# Patient Record
Sex: FEMALE | Race: WHITE | Employment: OTHER | URBAN - METROPOLITAN AREA
[De-identification: names, ages, dates, MRNs, and addresses within clinical notes are randomized per-mention and may not be internally consistent; named-entity substitution may affect disease eponyms.]

---

## 2017-02-16 PROBLEM — M85.80 OSTEOPENIA: Status: ACTIVE | Noted: 2017-02-16

## 2017-02-16 PROBLEM — Z79.811 VISIT FOR MONITORING ARIMIDEX THERAPY: Status: ACTIVE | Noted: 2017-02-16

## 2017-02-16 PROBLEM — Z51.81 VISIT FOR MONITORING ARIMIDEX THERAPY: Status: ACTIVE | Noted: 2017-02-16

## 2017-02-16 PROBLEM — Z79.811 USE OF ANASTROZOLE (ARIMIDEX): Status: ACTIVE | Noted: 2017-02-16

## 2017-08-28 ENCOUNTER — HOSPITAL ENCOUNTER (OUTPATIENT)
Dept: WOMENS IMAGING | Age: 62
Discharge: HOME | End: 2017-08-28
Attending: SURGERY | Admitting: OBSTETRICS & GYNECOLOGY

## 2017-08-28 DIAGNOSIS — C50.111 MALIGNANT NEOPLASM OF CENTRAL PORTION OF RIGHT FEMALE BREAST (HCC): ICD-10-CM

## 2017-08-28 DIAGNOSIS — Z79.811 USE OF ANASTROZOLE (ARIMIDEX): ICD-10-CM

## 2018-08-21 ENCOUNTER — HOSPITAL ENCOUNTER (OUTPATIENT)
Dept: WOMENS IMAGING | Age: 63
Discharge: HOME | End: 2018-08-21
Attending: FAMILY MEDICINE | Admitting: FAMILY MEDICINE

## 2018-08-21 DIAGNOSIS — C50.111 MALIGNANT NEOPLASM OF CENTRAL PORTION OF RIGHT FEMALE BREAST (HCC): ICD-10-CM

## 2018-08-21 DIAGNOSIS — Z12.31 VISIT FOR SCREENING MAMMOGRAM: ICD-10-CM

## 2019-08-23 ENCOUNTER — HOSPITAL ENCOUNTER (OUTPATIENT)
Dept: WOMENS IMAGING | Age: 64
Discharge: HOME | End: 2019-08-23
Payer: MEDICARE

## 2019-08-23 DIAGNOSIS — Z12.39 BREAST CANCER SCREENING: ICD-10-CM

## 2019-08-23 PROCEDURE — 77063 BREAST TOMOSYNTHESIS BI: CPT

## 2019-08-26 ENCOUNTER — HOSPITAL ENCOUNTER (OUTPATIENT)
Dept: WOMENS IMAGING | Age: 64
Discharge: HOME | End: 2019-08-26
Payer: MEDICARE

## 2019-08-26 ENCOUNTER — HOSPITAL ENCOUNTER (OUTPATIENT)
Dept: ULTRASOUND IMAGING | Age: 64
Discharge: HOME | End: 2019-08-26
Payer: MEDICARE

## 2019-08-26 DIAGNOSIS — N63.10 BREAST MASS, RIGHT: ICD-10-CM

## 2019-08-26 DIAGNOSIS — R92.8 ABNORMAL MAMMOGRAM: ICD-10-CM

## 2019-08-26 PROCEDURE — 76642 ULTRASOUND BREAST LIMITED: CPT

## 2019-08-26 PROCEDURE — G0279 TOMOSYNTHESIS, MAMMO: HCPCS

## 2020-02-27 ENCOUNTER — HOSPITAL ENCOUNTER (OUTPATIENT)
Dept: WOMENS IMAGING | Age: 65
Discharge: HOME | End: 2020-02-27
Payer: MEDICARE

## 2020-02-27 PROCEDURE — G0279 TOMOSYNTHESIS, MAMMO: HCPCS

## 2020-08-27 ENCOUNTER — HOSPITAL ENCOUNTER (OUTPATIENT)
Dept: WOMENS IMAGING | Age: 65
Discharge: HOME | End: 2020-08-27
Payer: MEDICARE

## 2020-08-27 PROCEDURE — 77063 BREAST TOMOSYNTHESIS BI: CPT

## 2020-12-28 ENCOUNTER — APPOINTMENT (OUTPATIENT)
Dept: GENERAL RADIOLOGY | Age: 65
DRG: 177 | End: 2020-12-28
Payer: MEDICARE

## 2020-12-28 ENCOUNTER — HOSPITAL ENCOUNTER (INPATIENT)
Age: 65
LOS: 26 days | Discharge: HOME | DRG: 177 | End: 2021-01-23
Attending: EMERGENCY MEDICINE | Admitting: STUDENT IN AN ORGANIZED HEALTH CARE EDUCATION/TRAINING PROGRAM
Payer: MEDICARE

## 2020-12-28 DIAGNOSIS — Z20.822 COVID-19 VIRUS TEST RESULT UNKNOWN: ICD-10-CM

## 2020-12-28 DIAGNOSIS — J18.9 PNEUMONIA DUE TO ORGANISM: Primary | ICD-10-CM

## 2020-12-28 LAB
A/G RATIO: 1.1 (ref 1.1–2.2)
ALBUMIN SERPL-MCNC: 3.3 G/DL (ref 3.4–5)
ALP BLD-CCNC: 60 U/L (ref 40–129)
ALT SERPL-CCNC: 25 U/L (ref 10–40)
ANION GAP SERPL CALCULATED.3IONS-SCNC: 13 MMOL/L (ref 3–16)
AST SERPL-CCNC: 52 U/L (ref 15–37)
BASOPHILS ABSOLUTE: 0 K/UL (ref 0–0.2)
BASOPHILS RELATIVE PERCENT: 0.7 %
BILIRUB SERPL-MCNC: <0.2 MG/DL (ref 0–1)
BUN BLDV-MCNC: 15 MG/DL (ref 7–20)
CALCIUM SERPL-MCNC: 8.8 MG/DL (ref 8.3–10.6)
CHLORIDE BLD-SCNC: 106 MMOL/L (ref 99–110)
CO2: 23 MMOL/L (ref 21–32)
CREAT SERPL-MCNC: 1 MG/DL (ref 0.6–1.2)
D DIMER: 0.41 UG/ML FEU
EOSINOPHILS ABSOLUTE: 0 K/UL (ref 0–0.6)
EOSINOPHILS RELATIVE PERCENT: 0.3 %
GFR AFRICAN AMERICAN: >60
GFR NON-AFRICAN AMERICAN: 56
GLOBULIN: 2.9 G/DL
GLUCOSE BLD-MCNC: 115 MG/DL (ref 70–99)
HCT VFR BLD CALC: 37 % (ref 36–48)
HEMOGLOBIN: 12 G/DL (ref 12–16)
LACTIC ACID: 1.1 MMOL/L (ref 0.4–2)
LYMPHOCYTES ABSOLUTE: 0.6 K/UL (ref 1–5.1)
LYMPHOCYTES RELATIVE PERCENT: 13.3 %
MAGNESIUM: 1.7 MG/DL (ref 1.8–2.4)
MCH RBC QN AUTO: 29.8 PG (ref 26–34)
MCHC RBC AUTO-ENTMCNC: 32.4 G/DL (ref 31–36)
MCV RBC AUTO: 91.8 FL (ref 80–100)
MONOCYTES ABSOLUTE: 0.3 K/UL (ref 0–1.3)
MONOCYTES RELATIVE PERCENT: 6.5 %
NEUTROPHILS ABSOLUTE: 3.5 K/UL (ref 1.7–7.7)
NEUTROPHILS RELATIVE PERCENT: 79.2 %
PDW BLD-RTO: 13.7 % (ref 12.4–15.4)
PLATELET # BLD: 218 K/UL (ref 135–450)
PMV BLD AUTO: 9 FL (ref 5–10.5)
POTASSIUM REFLEX MAGNESIUM: 3.3 MMOL/L (ref 3.5–5.1)
PRO-BNP: 285 PG/ML (ref 0–124)
RBC # BLD: 4.04 M/UL (ref 4–5.2)
SODIUM BLD-SCNC: 142 MMOL/L (ref 136–145)
TOTAL PROTEIN: 6.2 G/DL (ref 6.4–8.2)
TROPONIN: <0.01 NG/ML
WBC # BLD: 4.4 K/UL (ref 4–11)

## 2020-12-28 PROCEDURE — U0003 INFECTIOUS AGENT DETECTION BY NUCLEIC ACID (DNA OR RNA); SEVERE ACUTE RESPIRATORY SYNDROME CORONAVIRUS 2 (SARS-COV-2) (CORONAVIRUS DISEASE [COVID-19]), AMPLIFIED PROBE TECHNIQUE, MAKING USE OF HIGH THROUGHPUT TECHNOLOGIES AS DESCRIBED BY CMS-2020-01-R: HCPCS

## 2020-12-28 PROCEDURE — 36415 COLL VENOUS BLD VENIPUNCTURE: CPT

## 2020-12-28 PROCEDURE — 83880 ASSAY OF NATRIURETIC PEPTIDE: CPT

## 2020-12-28 PROCEDURE — 6370000000 HC RX 637 (ALT 250 FOR IP): Performed by: EMERGENCY MEDICINE

## 2020-12-28 PROCEDURE — 1200000000 HC SEMI PRIVATE

## 2020-12-28 PROCEDURE — 83735 ASSAY OF MAGNESIUM: CPT

## 2020-12-28 PROCEDURE — 6360000002 HC RX W HCPCS: Performed by: EMERGENCY MEDICINE

## 2020-12-28 PROCEDURE — 85379 FIBRIN DEGRADATION QUANT: CPT

## 2020-12-28 PROCEDURE — 80053 COMPREHEN METABOLIC PANEL: CPT

## 2020-12-28 PROCEDURE — 93005 ELECTROCARDIOGRAM TRACING: CPT | Performed by: EMERGENCY MEDICINE

## 2020-12-28 PROCEDURE — 87040 BLOOD CULTURE FOR BACTERIA: CPT

## 2020-12-28 PROCEDURE — XW033E5 INTRODUCTION OF REMDESIVIR ANTI-INFECTIVE INTO PERIPHERAL VEIN, PERCUTANEOUS APPROACH, NEW TECHNOLOGY GROUP 5: ICD-10-PCS | Performed by: FAMILY MEDICINE

## 2020-12-28 PROCEDURE — 99285 EMERGENCY DEPT VISIT HI MDM: CPT

## 2020-12-28 PROCEDURE — XW13325 TRANSFUSION OF CONVALESCENT PLASMA (NONAUTOLOGOUS) INTO PERIPHERAL VEIN, PERCUTANEOUS APPROACH, NEW TECHNOLOGY GROUP 5: ICD-10-PCS | Performed by: FAMILY MEDICINE

## 2020-12-28 PROCEDURE — 71045 X-RAY EXAM CHEST 1 VIEW: CPT

## 2020-12-28 PROCEDURE — 2580000003 HC RX 258: Performed by: EMERGENCY MEDICINE

## 2020-12-28 PROCEDURE — 85025 COMPLETE CBC W/AUTO DIFF WBC: CPT

## 2020-12-28 PROCEDURE — 83605 ASSAY OF LACTIC ACID: CPT

## 2020-12-28 PROCEDURE — 84484 ASSAY OF TROPONIN QUANT: CPT

## 2020-12-28 RX ORDER — ASPIRIN 81 MG/1
324 TABLET, CHEWABLE ORAL ONCE
Status: COMPLETED | OUTPATIENT
Start: 2020-12-28 | End: 2020-12-28

## 2020-12-28 RX ORDER — ONDANSETRON 2 MG/ML
4 INJECTION INTRAMUSCULAR; INTRAVENOUS ONCE
Status: COMPLETED | OUTPATIENT
Start: 2020-12-28 | End: 2020-12-28

## 2020-12-28 RX ORDER — DEXAMETHASONE SODIUM PHOSPHATE 4 MG/ML
6 INJECTION, SOLUTION INTRA-ARTICULAR; INTRALESIONAL; INTRAMUSCULAR; INTRAVENOUS; SOFT TISSUE ONCE
Status: COMPLETED | OUTPATIENT
Start: 2020-12-28 | End: 2020-12-28

## 2020-12-28 RX ORDER — SODIUM CHLORIDE 9 MG/ML
1000 INJECTION, SOLUTION INTRAVENOUS CONTINUOUS
Status: DISCONTINUED | OUTPATIENT
Start: 2020-12-28 | End: 2020-12-29

## 2020-12-28 RX ADMIN — AZITHROMYCIN MONOHYDRATE 500 MG: 500 INJECTION, POWDER, LYOPHILIZED, FOR SOLUTION INTRAVENOUS at 22:49

## 2020-12-28 RX ADMIN — ENOXAPARIN SODIUM 40 MG: 40 INJECTION SUBCUTANEOUS at 23:18

## 2020-12-28 RX ADMIN — DEXAMETHASONE SODIUM PHOSPHATE 6 MG: 4 INJECTION, SOLUTION INTRAMUSCULAR; INTRAVENOUS at 23:20

## 2020-12-28 RX ADMIN — ONDANSETRON 4 MG: 2 INJECTION INTRAMUSCULAR; INTRAVENOUS at 23:27

## 2020-12-28 RX ADMIN — CEFTRIAXONE 1 G: 1 INJECTION, POWDER, FOR SOLUTION INTRAMUSCULAR; INTRAVENOUS at 23:59

## 2020-12-28 RX ADMIN — ASPIRIN 324 MG: 81 TABLET, CHEWABLE ORAL at 22:48

## 2020-12-28 ASSESSMENT — PAIN SCALES - GENERAL: PAINLEVEL_OUTOF10: 6

## 2020-12-29 PROBLEM — E11.9 DM2 (DIABETES MELLITUS, TYPE 2) (HCC): Status: ACTIVE | Noted: 2020-12-29

## 2020-12-29 PROBLEM — E78.5 HLD (HYPERLIPIDEMIA): Status: ACTIVE | Noted: 2020-12-29

## 2020-12-29 PROBLEM — Z20.822 SUSPECTED COVID-19 VIRUS INFECTION: Status: ACTIVE | Noted: 2020-12-29

## 2020-12-29 PROBLEM — E03.9 HYPOTHYROIDISM: Status: ACTIVE | Noted: 2020-12-29

## 2020-12-29 PROBLEM — E28.2 PCOS (POLYCYSTIC OVARIAN SYNDROME): Status: ACTIVE | Noted: 2020-12-29

## 2020-12-29 LAB
A/G RATIO: 1.1 (ref 1.1–2.2)
ABO/RH: NORMAL
ALBUMIN SERPL-MCNC: 3 G/DL (ref 3.4–5)
ALP BLD-CCNC: 58 U/L (ref 40–129)
ALT SERPL-CCNC: 22 U/L (ref 10–40)
ANION GAP SERPL CALCULATED.3IONS-SCNC: 12 MMOL/L (ref 3–16)
ANTIBODY SCREEN: NORMAL
AST SERPL-CCNC: 43 U/L (ref 15–37)
BASOPHILS ABSOLUTE: 0 K/UL (ref 0–0.2)
BASOPHILS RELATIVE PERCENT: 0.1 %
BILIRUB SERPL-MCNC: <0.2 MG/DL (ref 0–1)
BUN BLDV-MCNC: 13 MG/DL (ref 7–20)
CALCIUM SERPL-MCNC: 8.4 MG/DL (ref 8.3–10.6)
CHLORIDE BLD-SCNC: 109 MMOL/L (ref 99–110)
CO2: 22 MMOL/L (ref 21–32)
CREAT SERPL-MCNC: 0.8 MG/DL (ref 0.6–1.2)
EOSINOPHILS ABSOLUTE: 0 K/UL (ref 0–0.6)
EOSINOPHILS RELATIVE PERCENT: 0 %
GFR AFRICAN AMERICAN: >60
GFR NON-AFRICAN AMERICAN: >60
GLOBULIN: 2.7 G/DL
GLUCOSE BLD-MCNC: 135 MG/DL (ref 70–99)
GLUCOSE BLD-MCNC: 147 MG/DL (ref 70–99)
GLUCOSE BLD-MCNC: 168 MG/DL (ref 70–99)
GLUCOSE BLD-MCNC: 175 MG/DL (ref 70–99)
GLUCOSE BLD-MCNC: 204 MG/DL (ref 70–99)
HCT VFR BLD CALC: 35.4 % (ref 36–48)
HEMOGLOBIN: 11.8 G/DL (ref 12–16)
L. PNEUMOPHILA SEROGP 1 UR AG: NORMAL
LYMPHOCYTES ABSOLUTE: 0.5 K/UL (ref 1–5.1)
LYMPHOCYTES RELATIVE PERCENT: 9.7 %
MAGNESIUM: 1.8 MG/DL (ref 1.8–2.4)
MCH RBC QN AUTO: 30.8 PG (ref 26–34)
MCHC RBC AUTO-ENTMCNC: 33.5 G/DL (ref 31–36)
MCV RBC AUTO: 91.8 FL (ref 80–100)
MONOCYTES ABSOLUTE: 0.2 K/UL (ref 0–1.3)
MONOCYTES RELATIVE PERCENT: 4 %
NEUTROPHILS ABSOLUTE: 4.1 K/UL (ref 1.7–7.7)
NEUTROPHILS RELATIVE PERCENT: 86.2 %
PDW BLD-RTO: 14.6 % (ref 12.4–15.4)
PERFORMED ON: ABNORMAL
PLATELET # BLD: 201 K/UL (ref 135–450)
PMV BLD AUTO: 9.1 FL (ref 5–10.5)
POTASSIUM REFLEX MAGNESIUM: 3.4 MMOL/L (ref 3.5–5.1)
PROCALCITONIN: 0.1 NG/ML (ref 0–0.15)
RBC # BLD: 3.85 M/UL (ref 4–5.2)
SARS-COV-2, PCR: DETECTED
SODIUM BLD-SCNC: 143 MMOL/L (ref 136–145)
STREP PNEUMONIAE ANTIGEN, URINE: NORMAL
TOTAL PROTEIN: 5.7 G/DL (ref 6.4–8.2)
WBC # BLD: 4.7 K/UL (ref 4–11)

## 2020-12-29 PROCEDURE — 6360000002 HC RX W HCPCS: Performed by: STUDENT IN AN ORGANIZED HEALTH CARE EDUCATION/TRAINING PROGRAM

## 2020-12-29 PROCEDURE — 2580000003 HC RX 258: Performed by: STUDENT IN AN ORGANIZED HEALTH CARE EDUCATION/TRAINING PROGRAM

## 2020-12-29 PROCEDURE — 80053 COMPREHEN METABOLIC PANEL: CPT

## 2020-12-29 PROCEDURE — 85025 COMPLETE CBC W/AUTO DIFF WBC: CPT

## 2020-12-29 PROCEDURE — 94640 AIRWAY INHALATION TREATMENT: CPT

## 2020-12-29 PROCEDURE — 6370000000 HC RX 637 (ALT 250 FOR IP): Performed by: STUDENT IN AN ORGANIZED HEALTH CARE EDUCATION/TRAINING PROGRAM

## 2020-12-29 PROCEDURE — 86901 BLOOD TYPING SEROLOGIC RH(D): CPT

## 2020-12-29 PROCEDURE — 87449 NOS EACH ORGANISM AG IA: CPT

## 2020-12-29 PROCEDURE — 2700000000 HC OXYGEN THERAPY PER DAY

## 2020-12-29 PROCEDURE — 84145 PROCALCITONIN (PCT): CPT

## 2020-12-29 PROCEDURE — 94761 N-INVAS EAR/PLS OXIMETRY MLT: CPT

## 2020-12-29 PROCEDURE — 86900 BLOOD TYPING SEROLOGIC ABO: CPT

## 2020-12-29 PROCEDURE — 83735 ASSAY OF MAGNESIUM: CPT

## 2020-12-29 PROCEDURE — 6370000000 HC RX 637 (ALT 250 FOR IP): Performed by: FAMILY MEDICINE

## 2020-12-29 PROCEDURE — 2060000000 HC ICU INTERMEDIATE R&B

## 2020-12-29 PROCEDURE — 86850 RBC ANTIBODY SCREEN: CPT

## 2020-12-29 PROCEDURE — 93005 ELECTROCARDIOGRAM TRACING: CPT | Performed by: EMERGENCY MEDICINE

## 2020-12-29 PROCEDURE — 2580000003 HC RX 258: Performed by: EMERGENCY MEDICINE

## 2020-12-29 PROCEDURE — 36415 COLL VENOUS BLD VENIPUNCTURE: CPT

## 2020-12-29 RX ORDER — DEXTROSE MONOHYDRATE 50 MG/ML
100 INJECTION, SOLUTION INTRAVENOUS PRN
Status: DISCONTINUED | OUTPATIENT
Start: 2020-12-29 | End: 2021-01-23 | Stop reason: HOSPADM

## 2020-12-29 RX ORDER — DICYCLOMINE HCL 20 MG
20 TABLET ORAL 3 TIMES DAILY PRN
Status: ON HOLD | COMMUNITY
End: 2021-01-22 | Stop reason: HOSPADM

## 2020-12-29 RX ORDER — DEXTROSE MONOHYDRATE 25 G/50ML
12.5 INJECTION, SOLUTION INTRAVENOUS PRN
Status: DISCONTINUED | OUTPATIENT
Start: 2020-12-29 | End: 2021-01-23 | Stop reason: HOSPADM

## 2020-12-29 RX ORDER — METOPROLOL SUCCINATE 100 MG/1
100 TABLET, EXTENDED RELEASE ORAL DAILY
Status: ON HOLD | COMMUNITY
End: 2021-01-22 | Stop reason: HOSPADM

## 2020-12-29 RX ORDER — LISINOPRIL 30 MG/1
30 TABLET ORAL DAILY
Status: ON HOLD | COMMUNITY
End: 2021-01-22 | Stop reason: HOSPADM

## 2020-12-29 RX ORDER — ANASTROZOLE 1 MG/1
1 TABLET ORAL DAILY
Status: DISCONTINUED | OUTPATIENT
Start: 2020-12-30 | End: 2021-01-23 | Stop reason: HOSPADM

## 2020-12-29 RX ORDER — AMLODIPINE BESYLATE 5 MG/1
5 TABLET ORAL DAILY
Status: ON HOLD | COMMUNITY
End: 2021-01-22 | Stop reason: SDUPTHER

## 2020-12-29 RX ORDER — SODIUM CHLORIDE 0.9 % (FLUSH) 0.9 %
10 SYRINGE (ML) INJECTION EVERY 12 HOURS SCHEDULED
Status: DISCONTINUED | OUTPATIENT
Start: 2020-12-29 | End: 2021-01-14 | Stop reason: SDUPTHER

## 2020-12-29 RX ORDER — DEXAMETHASONE 6 MG/1
6 TABLET ORAL DAILY
Status: DISCONTINUED | OUTPATIENT
Start: 2020-12-29 | End: 2020-12-30

## 2020-12-29 RX ORDER — ONDANSETRON 2 MG/ML
4 INJECTION INTRAMUSCULAR; INTRAVENOUS EVERY 6 HOURS PRN
Status: DISCONTINUED | OUTPATIENT
Start: 2020-12-29 | End: 2021-01-23 | Stop reason: HOSPADM

## 2020-12-29 RX ORDER — ASPIRIN 81 MG/1
81 TABLET, CHEWABLE ORAL DAILY
Status: DISCONTINUED | OUTPATIENT
Start: 2020-12-29 | End: 2021-01-21

## 2020-12-29 RX ORDER — SODIUM CHLORIDE 0.9 % (FLUSH) 0.9 %
10 SYRINGE (ML) INJECTION PRN
Status: DISCONTINUED | OUTPATIENT
Start: 2020-12-29 | End: 2021-01-14 | Stop reason: SDUPTHER

## 2020-12-29 RX ORDER — ALBUTEROL SULFATE 90 UG/1
2 AEROSOL, METERED RESPIRATORY (INHALATION) 4 TIMES DAILY
Status: DISCONTINUED | OUTPATIENT
Start: 2020-12-29 | End: 2021-01-10

## 2020-12-29 RX ORDER — SODIUM CHLORIDE 9 MG/ML
1000 INJECTION, SOLUTION INTRAVENOUS CONTINUOUS
Status: ACTIVE | OUTPATIENT
Start: 2020-12-29 | End: 2020-12-29

## 2020-12-29 RX ORDER — LEVOTHYROXINE SODIUM 0.07 MG/1
75 TABLET ORAL DAILY
Status: DISCONTINUED | OUTPATIENT
Start: 2020-12-29 | End: 2021-01-23 | Stop reason: HOSPADM

## 2020-12-29 RX ORDER — NICOTINE POLACRILEX 4 MG
15 LOZENGE BUCCAL PRN
Status: DISCONTINUED | OUTPATIENT
Start: 2020-12-29 | End: 2021-01-23 | Stop reason: HOSPADM

## 2020-12-29 RX ORDER — VITAMIN B COMPLEX
2000 TABLET ORAL DAILY
Status: DISCONTINUED | OUTPATIENT
Start: 2020-12-29 | End: 2021-01-11

## 2020-12-29 RX ORDER — GUAIFENESIN/DEXTROMETHORPHAN 100-10MG/5
5 SYRUP ORAL EVERY 4 HOURS PRN
Status: DISCONTINUED | OUTPATIENT
Start: 2020-12-29 | End: 2021-01-23 | Stop reason: HOSPADM

## 2020-12-29 RX ORDER — ACETAMINOPHEN 650 MG/1
650 SUPPOSITORY RECTAL EVERY 6 HOURS PRN
Status: DISCONTINUED | OUTPATIENT
Start: 2020-12-29 | End: 2021-01-23 | Stop reason: HOSPADM

## 2020-12-29 RX ORDER — ATORVASTATIN CALCIUM 40 MG/1
40 TABLET, FILM COATED ORAL DAILY
Status: DISCONTINUED | OUTPATIENT
Start: 2020-12-29 | End: 2021-01-23 | Stop reason: HOSPADM

## 2020-12-29 RX ORDER — ACETAMINOPHEN 325 MG/1
650 TABLET ORAL EVERY 6 HOURS PRN
Status: DISCONTINUED | OUTPATIENT
Start: 2020-12-29 | End: 2021-01-23 | Stop reason: HOSPADM

## 2020-12-29 RX ORDER — TIZANIDINE 2 MG/1
4 TABLET ORAL NIGHTLY
Status: ON HOLD | COMMUNITY
End: 2021-01-22 | Stop reason: HOSPADM

## 2020-12-29 RX ORDER — DULOXETIN HYDROCHLORIDE 60 MG/1
60 CAPSULE, DELAYED RELEASE ORAL DAILY
Status: DISCONTINUED | OUTPATIENT
Start: 2020-12-30 | End: 2021-01-23 | Stop reason: HOSPADM

## 2020-12-29 RX ADMIN — IPRATROPIUM BROMIDE 2 PUFF: 17 AEROSOL, METERED RESPIRATORY (INHALATION) at 12:20

## 2020-12-29 RX ADMIN — IPRATROPIUM BROMIDE 2 PUFF: 17 AEROSOL, METERED RESPIRATORY (INHALATION) at 16:57

## 2020-12-29 RX ADMIN — ALBUTEROL SULFATE 2 PUFF: 90 AEROSOL, METERED RESPIRATORY (INHALATION) at 16:57

## 2020-12-29 RX ADMIN — AZITHROMYCIN MONOHYDRATE 500 MG: 500 INJECTION, POWDER, LYOPHILIZED, FOR SOLUTION INTRAVENOUS at 23:31

## 2020-12-29 RX ADMIN — SODIUM CHLORIDE 1000 ML: 9 INJECTION, SOLUTION INTRAVENOUS at 03:24

## 2020-12-29 RX ADMIN — Medication 2000 UNITS: at 20:56

## 2020-12-29 RX ADMIN — ATORVASTATIN CALCIUM 40 MG: 40 TABLET, FILM COATED ORAL at 10:01

## 2020-12-29 RX ADMIN — ENOXAPARIN SODIUM 30 MG: 30 INJECTION SUBCUTANEOUS at 10:01

## 2020-12-29 RX ADMIN — ALBUTEROL SULFATE 2 PUFF: 90 AEROSOL, METERED RESPIRATORY (INHALATION) at 08:46

## 2020-12-29 RX ADMIN — INSULIN LISPRO 1 UNITS: 100 INJECTION, SOLUTION INTRAVENOUS; SUBCUTANEOUS at 20:58

## 2020-12-29 RX ADMIN — DEXAMETHASONE 6 MG: 6 TABLET ORAL at 10:01

## 2020-12-29 RX ADMIN — SODIUM CHLORIDE 1000 ML: 9 INJECTION, SOLUTION INTRAVENOUS at 00:20

## 2020-12-29 RX ADMIN — SODIUM CHLORIDE, PRESERVATIVE FREE 10 ML: 5 INJECTION INTRAVENOUS at 20:57

## 2020-12-29 RX ADMIN — ALBUTEROL SULFATE 2 PUFF: 90 AEROSOL, METERED RESPIRATORY (INHALATION) at 20:01

## 2020-12-29 RX ADMIN — INSULIN LISPRO 1 UNITS: 100 INJECTION, SOLUTION INTRAVENOUS; SUBCUTANEOUS at 10:01

## 2020-12-29 RX ADMIN — ONDANSETRON 4 MG: 2 INJECTION INTRAMUSCULAR; INTRAVENOUS at 12:09

## 2020-12-29 RX ADMIN — ASPIRIN 81 MG 81 MG: 81 TABLET ORAL at 10:01

## 2020-12-29 RX ADMIN — INSULIN LISPRO 1 UNITS: 100 INJECTION, SOLUTION INTRAVENOUS; SUBCUTANEOUS at 17:04

## 2020-12-29 RX ADMIN — IPRATROPIUM BROMIDE 2 PUFF: 17 AEROSOL, METERED RESPIRATORY (INHALATION) at 08:47

## 2020-12-29 RX ADMIN — ALBUTEROL SULFATE 2 PUFF: 90 AEROSOL, METERED RESPIRATORY (INHALATION) at 12:20

## 2020-12-29 RX ADMIN — ENOXAPARIN SODIUM 30 MG: 30 INJECTION SUBCUTANEOUS at 20:57

## 2020-12-29 RX ADMIN — IPRATROPIUM BROMIDE 2 PUFF: 17 AEROSOL, METERED RESPIRATORY (INHALATION) at 20:01

## 2020-12-29 RX ADMIN — LEVOTHYROXINE SODIUM 75 MCG: 0.07 TABLET ORAL at 06:18

## 2020-12-29 ASSESSMENT — PAIN SCALES - GENERAL: PAINLEVEL_OUTOF10: 0

## 2020-12-29 NOTE — ED NOTES
[facility] center called for 0890 [facility], [address] [place] [address]  12/28/20 2790

## 2020-12-29 NOTE — ED NOTES
Report called to Kin nurse at [facility] [place]. Also informed patient will still need Procalcitonin and Respiratory Culture when patient gets to [facility] [place]. No coughing or productive sputum while patient has been in the ED      Emi Frankel, RN  12/29/20 0928 [address] [place] [address], [address] [place] [address]  12/29/20 8939

## 2020-12-29 NOTE — ED NOTES
Patient walk to bathroom and back to bed. 02 sat dropped low 80's. Patient denies sob. She states, she is feeling better. Patient's O2 sat went back up 91-92% when she was back in bed with Oxygen at 3 liter n/c.       López Romero, DUY  12/29/20 [id number] Jamie Dela Cruz RN  12/29/20 2700

## 2020-12-29 NOTE — ED NOTES
[facility] here to take patient to [facility], [address] [place] [address]  12/29/20 1001

## 2020-12-29 NOTE — ED PROVIDER NOTES
CHIEF COMPLAINT  Chief Complaint   Patient presents with    Shortness of Breath     c/o sore throat, cough, and right ear feels full x 2 days. Patient also c/o SOB. Patient currently eating cheeseburger. No sob noted. Patient 88% r/a        HISTORY OF PRESENT ILLNESS  Syl Michaels is a 63 y.o. female who presents to the ED complaining of a 2-day history of nonproductive cough, sore throat, right ear fullness, diarrhea, low-grade fever, loss of taste and shortness of breath. Patient reports no chest pain, arm pain or jaw pain. No rash. No myalgias. No vomiting. No headaches. Non-smoker. No other complaints, modifying factors or associated symptoms. Nursing notes reviewed. Past Medical History:   Diagnosis Date    Arthritis     Breast cancer ([facility] [address]     Depression     Hypertension     Obesity     Peptic ulcer disease     Scoliosis     Skin cancer     basal cell    Thyroid disease      Past Surgical History:   Procedure Laterality Date    BACK SURGERY      BREAST BIOPSY      BREAST LUMPECTOMY      CARPAL TUNNEL RELEASE      DILATION AND CURETTAGE OF UTERUS      HYSTERECTOMY      OVARY REMOVAL      THYROID SURGERY       History reviewed. No pertinent family history.   Social History     Socioeconomic History    Marital status:      Spouse name: Not on file    Number of children: Not on file    Years of education: Not on file    Highest education level: Not on file   Occupational History    Not on file   Social Needs    Financial resource strain: Not on file    Food insecurity     Worry: Not on file     Inability: Not on file    Transportation needs     Medical: Not on file     Non-medical: Not on file   Tobacco Use    Smoking status: Never Smoker    Smokeless tobacco: Never Used   Substance and Sexual Activity    Alcohol use: Not Currently    Drug use: Never    Sexual activity: Not on file   Lifestyle    Physical activity     Days per week: Not on file     Minutes per session: Not on file    Stress: Not on file   Relationships    Social connections     Talks on phone: Not on file     Gets together: Not on file     Attends [employer] service: Not on file     Active member of club or organization: Not on file     Attends meetings of clubs or organizations: Not on file     Relationship status: Not on file    Intimate partner violence     Fear of current or ex partner: Not on file     Emotionally abused: Not on file     Physically abused: Not on file     Forced sexual activity: Not on file   Other Topics Concern    Not on file   Social History Narrative    Not on file     Current Facility-Administered Medications   Medication Dose Route Frequency Provider Last Rate Last Admin    cefTRIAXone (ROCEPHIN) 1 g IVPB in 50 mL D5W minibag  1 g Intravenous Once Ricky Goff MD        azithromycin (ZITHROMAX) 500 mg in D5W 250ml addavial  500 mg Intravenous Once Ricky Goff MD        aspirin chewable tablet 324 mg  324 mg Oral Once Ricky Goff MD         Current Outpatient Medications   Medication Sig Dispense Refill    anastrozole (ARIMIDEX) 1 MG tablet TAKE 1 TABLET BY MOUTH DAILY 30 tablet 5    alendronate (FOSAMAX) 70 MG tablet Take 1 tablet by mouth every 7 days 4 tablet 3    silver sulfADIAZINE (SILVADENE) 1 % cream Apply topically twice a day to open areas on right breast 25 g 0    Fexofenadine HCl (ALLEGRA PO) Take 1 tablet by mouth daily as needed       Calcium Carbonate-Vit D-Min (CALCIUM 1200 PO) Take by mouth Daily       Multiple Vitamins-Minerals (CENTRUM PO) Take by mouth      simvastatin (ZOCOR) 40 MG tablet Take 40 mg by mouth nightly      levothyroxine (LEVOTHROID) 75 MCG tablet Take 75 mcg by mouth Daily      DULoxetine (CYMBALTA) 60 MG extended release capsule Take 60 mg by mouth daily      metFORMIN (GLUCOPHAGE) 500 MG tablet Take 500 mg by mouth 4 times daily      aspirin 81 MG tablet Take 81 mg by mouth daily      naproxen (NAPROSYN) 375 MG tablet Take 375 mg by mouth 2 times daily (with meals)      clobetasol (TEMOVATE) 0.05 % ointment Apply topically once a week Apply topically 2 times daily. Allergies   Allergen Reactions    Mercury        REVIEW OF SYSTEMS  Positives and pertinent negatives as per HPI. Ten other systems were reviewed and are negative. Nursing notes pertaining to ROS were reviewed. PHYSICAL EXAM   /78   Pulse 103   Temp 99.9 °F (37.7 °C) (Oral)   Resp 27   SpO2 94%   GENERAL APPEARANCE: Awake and alert. Cooperative. No acute distress. No increased work of breathing or accessory muscle use. HEAD: Normocephalic. Atraumatic. EYES: PERRL. EOM's grossly intact. No scleral icterus, injection or exudate. ENT: Mucous membranes are moist.  TMs are clear bilaterally. Oral cavity is clear without tonsillar hypertrophy or exudate. No palatal petechiae. No frontal or maxillary sinus tenderness to palpation. Nasal MM are clear. NECK: Supple. Normal ROM. No cervical lymphadenopathy. CHEST:  Regular rate and rhythm, no murmurs, rubs or gallops. LUNGS: Breathing is unlabored. Speaking comfortably in full sentences. Clear through auscultation bilaterally  ABDOMEN: Nondistended, nontender. EXTREMITIES: MAEE. No acute deformities. SKIN: Warm and dry. No rash  NEUROLOGICAL: Alert and oriented. RADIOLOGY    XR CHEST PORTABLE   Final Result   Bilateral ill-defined airspace opacities could represent atypical pneumonia,   multifocal bacterial pneumonia or subsegmental atelectasis           12 LEAD EKG AS INTERPRETED BY ME:  NSR  RATE OF 98  NORMAL AXIS   NORMAL INTERVALS  NO ST-T SIGNS OF ACUTE ISCHEMIA OR INFARCT        LABS  I have reviewed all labs for this visit.    Results for orders placed or performed during the hospital encounter of 12/28/20   CBC Auto Differential   Result Value Ref Range    WBC 4.4 4.0 - 11.0 K/uL    RBC 4.04 4.00 - 5.20 M/uL    Hemoglobin 12.0 12.0 - 16.0 g/dL    Hematocrit 37.0 36.0 - 48.0 %    MCV 91.8 80.0 - 100.0 fL    MCH 29.8 26.0 - 34.0 pg    MCHC 32.4 31.0 - 36.0 g/dL    RDW 13.7 12.4 - 15.4 %    Platelets 124 688 - 627 K/uL    MPV 9.0 5.0 - 10.5 fL    Neutrophils % 79.2 %    Lymphocytes % 13.3 %    Monocytes % 6.5 %    Eosinophils % 0.3 %    Basophils % 0.7 %    Neutrophils Absolute 3.5 1.7 - 7.7 K/uL    Lymphocytes Absolute 0.6 (L) 1.0 - 5.1 K/uL    Monocytes Absolute 0.3 0.0 - 1.3 K/uL    Eosinophils Absolute 0.0 0.0 - 0.6 K/uL    Basophils Absolute 0.0 0.0 - 0.2 K/uL   Comprehensive Metabolic Panel w/ Reflex to MG   Result Value Ref Range    Sodium 142 136 - 145 mmol/L    Potassium reflex Magnesium 3.3 (L) 3.5 - 5.1 mmol/L    Chloride 106 99 - 110 mmol/L    CO2 23 21 - 32 mmol/L    Anion Gap 13 3 - 16    Glucose 115 (H) 70 - 99 mg/dL    BUN 15 7 - 20 mg/dL    CREATININE 1.0 0.6 - 1.2 mg/dL    GFR Non- 56 (A) >60    GFR African American >60 >60    Calcium 8.8 8.3 - 10.6 mg/dL    Total Protein 6.2 (L) 6.4 - 8.2 g/dL    Alb 3.3 (L) 3.4 - 5.0 g/dL    Albumin/Globulin Ratio 1.1 1.1 - 2.2    Total Bilirubin <0.2 0.0 - 1.0 mg/dL    Alkaline Phosphatase 60 40 - 129 U/L    ALT 25 10 - 40 U/L    AST 52 (H) 15 - 37 U/L    Globulin 2.9 g/dL   Troponin   Result Value Ref Range    Troponin <0.01 <0.01 ng/mL   Brain Natriuretic Peptide   Result Value Ref Range    Pro- (H) 0 - 124 pg/mL   D-Dimer, Quantitative   Result Value Ref Range    D-Dimer, Quant 0.41 <0.50 ug/mL FEU   Lactic Acid, Plasma   Result Value Ref Range    Lactic Acid 1.1 0.4 - 2.0 mmol/L   Magnesium   Result Value Ref Range    Magnesium 1.70 (L) 1.80 - 2.40 mg/dL           ED COURSE/MDM  Upper respiratory infection with suspicion for Covid and the presence of tachycardia, dyspnea at rest and oxygen requiring hypoxia. Patient does have multifocal bilateral opacities consistent with atypical pneumonia. Patient has a normal lactate. Patient has a normal D-dimer, troponin.   Patient will be started on

## 2020-12-29 NOTE — PROGRESS NOTES
4 Eyes Admission Assessment     I agree as the admission nurse that 2 RN's have performed a thorough Head to Toe Skin Assessment on the patient. ALL assessment sites listed below have been assessed on admission. Areas assessed by both nurses: Kin and Ivory Mcmahoner  [x]   Head, Face, and Ears   [x]   Shoulders, Back, and Chest  [x]   Arms, Elbows, and Hands   [x]   Coccyx, Sacrum, and Ischum  [x]   Legs, Feet, and Heels        Does the Patient have Skin Breakdown?   No         Anatoly Prevention initiated:  No   Wound Care Orders initiated:  No      [facility] nurse consulted for Pressure Injury (Stage 3,4, Unstageable, DTI, NWPT, and Complex wounds):  No      Nurse 1 eSignature: Electronically signed by Ej Mcdonald RN on 12/29/20 at 5:52 AM EST    **SHARE this note so that the co-signing nurse is able to place an eSignature**    Nurse 2 eSignature: {Esignature:[id number]}

## 2020-12-29 NOTE — ED NOTES
[address] assigned bed 6142 called access center for transportation      Wu Hillman RN  12/28/20 8961

## 2020-12-29 NOTE — CARE COORDINATION
INITIAL CASE MANAGEMENT ASSESSMENT    Reviewed chart, unable to meet with patient due to isolation status. Call to patient's room, spoke with patient over the phone to assess possible discharge needs. Explained Case Management role/services. Living Situation: Lives w/ 35 yo son in ranch w/ basement - patient doesn't go in basement, laundry on main level    ADLs: Independent, patient has been caring for her parents in their residence for past 9 weeks     DME: [employer]    PT/OT Recs: Not ordered, patient has been getting up on own in room     Active Services: None     Transportation: Active , son will transport home at [employer]     Medications: Uses [employer] mail order, for one time prescriptions uses [facility] in [place] - no trouble purchasing meds    PCP: Elizabeth Simental    PLAN/COMMENTS: Patient plans to return home at discharge. Denies needing assistance. Monitor for home O2 needs, currently on 7L O2. CM provided contact information for patient or family to call with any questions. CM will follow and assist as needed.   Electronically signed by Brady Post RN Case Management [phone] on 12/29/2020 at 10:06 AM

## 2020-12-29 NOTE — H&P
Hospital Medicine History & Physical      PCP: Dayday Agosto MD    Date of Admission: 12/28/2020    Date of Service: Pt seen/examined on 12/29/2020 and Admitted to Inpatient     Chief Complaint:  nonproductive cough, sore throat, right ear fullness      History Of Present Illness: The patient is a 63 y.o. female with past medical history of breast cancer currently on treatment with anastrozole, hypertension, polycystic ovarian syndrome using Metformin and not diabetic, hypothyroidism, depression who presents to [facility] from [facility] ED for 2 to 3-day history of above symptoms of nonproductive cough with concurrent sore throat and right ear fullness as well as just feeling off in terms of energy for the past few days. Patient overall remarks that she has not felt acutely in any respiratory distress but has felt somewhat more tired with exertion and possibly short of breath from that point of view. Patient otherwise denies any other symptoms of fever, chills, nausea/vomiting/diarrhea, dysuria, rashes, chest pain, dizziness, syncope. She has noted some lightheadedness in addition to the sore throat and cough as well as this fatigue with exertion and possible shortness of breath. She denies any smoking or any other use of drugs, she also denies any sick contacts of note according to her. Currently in the ED she was found to be 88% on room air and was started on oxygen therapy but was eventually needing higher levels to about 6 L of nasal cannula oxygen.     Past Medical History:        Diagnosis Date    Arthritis     Breast cancer ([address]     Depression     Hypertension     Obesity     Peptic ulcer disease     Scoliosis     Skin cancer     basal cell    Thyroid disease        Past Surgical History:        Procedure Laterality Date    BACK SURGERY  BREAST BIOPSY      BREAST LUMPECTOMY      CARPAL TUNNEL RELEASE      DILATION AND CURETTAGE OF UTERUS      HYSTERECTOMY      OVARY REMOVAL      THYROID SURGERY         Medications Prior to Admission:    Prior to Admission medications    Medication Sig Start Date End Date Taking? Authorizing Provider   anastrozole (ARIMIDEX) 1 MG tablet TAKE 1 TABLET BY MOUTH DAILY 8/8/17  Yes Royetta Homans, MD   alendronate (FOSAMAX) 70 MG tablet Take 1 tablet by mouth every 7 days 6/1/17  Yes Royetta Homans, MD   silver sulfADIAZINE (SILVADENE) 1 % cream Apply topically twice a day to open areas on right breast 2/15/17  Yes RALPH Geronimo - CNP   Fexofenadine HCl (ALLEGRA PO) Take 1 tablet by mouth daily as needed    Yes Historical Provider, MD   Calcium Carbonate-Vit D-Min (CALCIUM 1200 PO) Take by mouth Daily    Yes Historical Provider, MD   Multiple Vitamins-Minerals (CENTRUM PO) Take by mouth   Yes Historical Provider, MD   simvastatin (ZOCOR) 40 MG tablet Take 40 mg by mouth nightly   Yes Historical Provider, MD   levothyroxine (LEVOTHROID) 75 MCG tablet Take 75 mcg by mouth Daily   Yes Historical Provider, MD   DULoxetine (CYMBALTA) 60 MG extended release capsule Take 60 mg by mouth daily   Yes Historical Provider, MD   metFORMIN (GLUCOPHAGE) 500 MG tablet Take 500 mg by mouth 4 times daily   Yes Historical Provider, MD   aspirin 81 MG tablet Take 81 mg by mouth daily   Yes Historical Provider, MD   naproxen (NAPROSYN) 375 MG tablet Take 375 mg by mouth 2 times daily (with meals)   Yes Historical Provider, MD   clobetasol (TEMOVATE) 0.05 % ointment Apply topically once a week Apply topically 2 times daily. Yes Historical Provider, MD       Allergies:  Mercury    Social History:  The patient currently lives home    TOBACCO:   reports that she has never smoked. She has never used smokeless tobacco.  ETOH:   reports previous alcohol use.       Family History:  Reviewed in detail and negative for DM, Early CAD, Cancer, CVA. Positive as follows:    History reviewed. No pertinent family history. REVIEW OF SYSTEMS:   as noted in the HPI. All other systems reviewed and negative. PHYSICAL EXAM:    /69   Pulse 94   Temp 98 °F (36.7 °C) (Oral)   Resp 30   Ht 5' 6\" (1.676 m)   Wt 193 lb 9 oz (87.8 kg)   SpO2 91%   BMI 31.24 kg/m²     General appearance: Using nasal cannula oxygen at this time, no acute respiratory distress at this time, seems relatively comfortable, alert and oriented x4  HEENT Normal cephalic, atraumatic without obvious deformity. Pupils equal, round, and reactive to light. Extra ocular muscles intact. Conjunctivae/corneas clear. Dry mucous membranes  Neck: Supple, no JVD  Lungs: Diminished breath sounds throughout all lobes, minimal crackles noted to the bases  Heart: Regular rate and rhythm with Normal S1/S2 without murmurs, rubs or gallops, point of maximum impulse non-displaced  Abdomen: Soft, nontender, nondistended, active bowel sounds  Extremities: No edema noted bilaterally to lower extremities  Skin: No rashes  Neurologic: Alert and oriented X 3, neurovascularly intact with sensory/motor intact upper extremities/lower extremities, bilaterally. Cranial nerves: II-XII intact, grossly non-focal.  Mental status: Alert, oriented, thought content appropriate. Capillary Refill: Acceptable  < 3 seconds  Peripheral Pulses: +3 Easily felt, not easily obliterated with pressure      Chest x-ray: Bilateral ill-defined airspace opacities could represent atypical pneumonia, multifocal bacterial pneumonia or subsegmental atelectasis    CBC   Recent Labs     12/28/20 2140   WBC 4.4   HGB 12.0   HCT 37.0         RENAL  Recent Labs     12/28/20  2140      K 3.3*      CO2 23   BUN 15   CREATININE 1.0     LFT'S  Recent Labs     12/28/20  2140   AST 52*   ALT 25   BILITOT <0.2   ALKPHOS 60     COAG  No results for input(s): INR in the last 72 hours.   CARDIAC ENZYMES  Recent Labs     12/28/20 2140   TROPONINI <0.01       U/A:  No results found for: NITRITE, COLORU, WBCUA, RBCUA, MUCUS, BACTERIA, CLARITYU, SPECGRAV, LEUKOCYTESUR, BLOODU, GLUCOSEU, AMORPHOUS    ABG  No results found for: KMA0QDX, BEART, R2OFCFEM, PHART, THGBART, BBW9FHS, PO2ART, ZGD4SMU        Active Hospital Problems    Diagnosis Date Noted    Suspected COVID-19 virus infection [Z20.828] 12/29/2020     Priority: High    Pneumonia [J18.9] 12/28/2020     Priority: High    Hypothyroidism [E03.9] 12/29/2020    HLD (hyperlipidemia) [E78.5] 12/29/2020    PCOS (polycystic ovarian syndrome) [E28.2] 12/29/2020         PHYSICIANS CERTIFICATION:    I certify that Azucena Mccarthy is expected to be hospitalized for greater than 2 midnights based on the following assessment and plan:      ASSESSMENT/PLAN:  · Covid PCR at this time pending, will currently treat for Covid as well as possible bacterial pneumonia  · Started on Rocephin and Zithromax as well as Decadron daily therapy  · Maintenance IV fluids 100/h of normal saline for 1 hours  · Repeat labs in the morning including procalcitonin  · Patient is not diabetic, takes Metformin for her PCOS, however we will start patient on carb controlled diet as well as insulin therapy with ACHS Accu-Cheks in light of patient's use of Decadron at this time  · Restart home medications in the morning, holding anastrozole at this time but can be readdressed as to whether to start it while patient is inpatient at this time  · If patient's respiratory status continues to worsen, consider a CAT scan of the chest with or without IV contrast to further evaluate patient's pneumonia  · Discussion had with patient, patient would like to be full code and agrees to anything including intubation, Yovana Mahoney her son is the power of  with phone number [phone]    DVT Prophylaxis: Lovenox  Diet: DIET LOW SODIUM 2 GM; Carb Control: 4 carb choices (60 gms)/meal  Code Status: Full

## 2020-12-29 NOTE — ED NOTES
Patient walked back to ED room 3 holding fast food. Gait steady.       Ming Montana RN  12/28/20 3546

## 2020-12-29 NOTE — ED NOTES
No coughing noted with patient. Patient states, she has been having dry cough at home.      Respiratory culture and procalcitonin order per Dr. Hortencia Smith request. Both are send out labs to [facility] [place]. Patient will be picked up by transport ambulance at 1:30 am. Dr. Micheal Caldwell states, labs can be obtained at [facility], RN  12/28/20 7867

## 2020-12-29 NOTE — ED NOTES
Waited to do vitals d/t patient eating cheeseburger and drinking soda pop. Patient states, she has not eaten all day. Checked vitals after patient was done eating. O2 sat 86-88% r/a.  Placed patient on 3 liter oxygen n/c      Ethel Amezcua RN  12/28/20 6480

## 2020-12-29 NOTE — PLAN OF CARE
Problem: Pain:  Goal: Pain level will decrease  Description: Pain level will decrease  Outcome: Ongoing  Pt has been free of pain during this shift.  Pt is able to rate pain on a numerical scale     Problem: Pain:  Goal: Control of chronic pain  Description: Control of chronic pain  Outcome: Ongoing

## 2020-12-29 NOTE — PROGRESS NOTES
Hospitalist Progress Note      PCP: Kelsie Estevez MD    Date of Admission: 12/28/2020      Hospital Course: admitted with non productive cough and hypoxia, with COVID 19 PNA     Subjective:    Patient seen and examined. RAYO, + dry cough. Moving around to bathroom and back. No dyspnea at rest. No n/v.     Medications:  Reviewed    Infusion Medications    dextrose      sodium chloride Stopped (12/29/20 1010)     Scheduled Medications    sodium chloride flush  10 mL Intravenous 2 times per day    aspirin  81 mg Oral Daily    levothyroxine  75 mcg Oral Daily    atorvastatin  40 mg Oral Daily    insulin lispro  0-6 Units Subcutaneous TID WC    insulin lispro  0-3 Units Subcutaneous Nightly    albuterol sulfate HFA  2 puff Inhalation 4x daily    And    ipratropium  2 puff Inhalation 4x daily    azithromycin  500 mg Intravenous Q24H    And    [START ON 12/30/2020] cefTRIAXone (ROCEPHIN) IV  1 g Intravenous Q24H    dexamethasone  6 mg Oral Daily    enoxaparin  30 mg Subcutaneous BID     PRN Meds: sodium chloride flush, acetaminophen **OR** acetaminophen, glucose, dextrose, glucagon (rDNA), dextrose, ondansetron, guaiFENesin-dextromethorphan      Intake/Output Summary (Last 24 hours) at 12/29/2020 1025  Last data filed at 12/29/2020 0958  Gross per 24 hour   Intake 730 ml   Output 150 ml   Net 580 ml       Physical Exam Performed:    /75   Pulse 94   Temp 98 °F (36.7 °C) (Oral)   Resp 18   Ht 5' 6\" (1.676 m)   Wt 193 lb 9 oz (87.8 kg)   SpO2 90%   BMI 31.24 kg/m²     General appearance: No apparent distress, alert and cooperative. HEENT: Conjunctivae/corneas clear, neck supple w/ full ROM  Respiratory:  Normal respiratory effort. Rales at bases  Cardiovascular: Regular rate and rhythm, normal S1/S2, no murmurs  Abdomen: Soft, non-tender, non-distended with normal bowel sounds.   Musculoskeletal: No edema bilaterally  Neurologic:  No new focal deficits  Psychiatric: Alert and oriented,

## 2020-12-29 NOTE — PROGRESS NOTES
Pt arrived to floor via stretcher from ED and ambulated to bed. Telemetry activated. Patient oriented to room and use of call light. Call light and personal items within reach. Admission and assessment initiated. POC and education initiated and reviewed with patient. Gave patient turkey sandwich and glass of ice water. Will continue to monitor.

## 2020-12-30 LAB
A/G RATIO: 1.2 (ref 1.1–2.2)
ALBUMIN SERPL-MCNC: 3.2 G/DL (ref 3.4–5)
ALP BLD-CCNC: 60 U/L (ref 40–129)
ALT SERPL-CCNC: 25 U/L (ref 10–40)
ANION GAP SERPL CALCULATED.3IONS-SCNC: 11 MMOL/L (ref 3–16)
APTT: 31.3 SEC (ref 24.2–36.2)
AST SERPL-CCNC: 49 U/L (ref 15–37)
BASOPHILS ABSOLUTE: 0 K/UL (ref 0–0.2)
BASOPHILS RELATIVE PERCENT: 0.1 %
BILIRUB SERPL-MCNC: <0.2 MG/DL (ref 0–1)
BUN BLDV-MCNC: 11 MG/DL (ref 7–20)
CALCIUM SERPL-MCNC: 8.5 MG/DL (ref 8.3–10.6)
CHLORIDE BLD-SCNC: 108 MMOL/L (ref 99–110)
CO2: 26 MMOL/L (ref 21–32)
CREAT SERPL-MCNC: 0.7 MG/DL (ref 0.6–1.2)
D DIMER: 4134 NG/ML DDU (ref 0–229)
EOSINOPHILS ABSOLUTE: 0 K/UL (ref 0–0.6)
EOSINOPHILS RELATIVE PERCENT: 0 %
ESTIMATED AVERAGE GLUCOSE: 125.5 MG/DL
FERRITIN: 519.2 NG/ML (ref 15–150)
FIBRINOGEN: 511 MG/DL (ref 200–397)
GFR AFRICAN AMERICAN: >60
GFR NON-AFRICAN AMERICAN: >60
GLOBULIN: 2.7 G/DL
GLUCOSE BLD-MCNC: 119 MG/DL (ref 70–99)
GLUCOSE BLD-MCNC: 133 MG/DL (ref 70–99)
GLUCOSE BLD-MCNC: 139 MG/DL (ref 70–99)
GLUCOSE BLD-MCNC: 146 MG/DL (ref 70–99)
HBA1C MFR BLD: 6 %
HCT VFR BLD CALC: 35.3 % (ref 36–48)
HEMOGLOBIN: 12 G/DL (ref 12–16)
INR BLD: 1 (ref 0.86–1.14)
LACTATE DEHYDROGENASE: 484 U/L (ref 100–190)
LYMPHOCYTES ABSOLUTE: 0.7 K/UL (ref 1–5.1)
LYMPHOCYTES RELATIVE PERCENT: 8.9 %
MAGNESIUM: 1.7 MG/DL (ref 1.8–2.4)
MCH RBC QN AUTO: 31 PG (ref 26–34)
MCHC RBC AUTO-ENTMCNC: 33.9 G/DL (ref 31–36)
MCV RBC AUTO: 91.3 FL (ref 80–100)
MONOCYTES ABSOLUTE: 0.7 K/UL (ref 0–1.3)
MONOCYTES RELATIVE PERCENT: 8.3 %
NEUTROPHILS ABSOLUTE: 7 K/UL (ref 1.7–7.7)
NEUTROPHILS RELATIVE PERCENT: 82.7 %
PDW BLD-RTO: 14.6 % (ref 12.4–15.4)
PERFORMED ON: ABNORMAL
PLATELET # BLD: 248 K/UL (ref 135–450)
PMV BLD AUTO: 9 FL (ref 5–10.5)
POTASSIUM REFLEX MAGNESIUM: 3.2 MMOL/L (ref 3.5–5.1)
PROCALCITONIN: 0.09 NG/ML (ref 0–0.15)
PROTHROMBIN TIME: 11.6 SEC (ref 10–13.2)
RBC # BLD: 3.87 M/UL (ref 4–5.2)
SODIUM BLD-SCNC: 145 MMOL/L (ref 136–145)
TOTAL PROTEIN: 5.9 G/DL (ref 6.4–8.2)
VITAMIN D 25-HYDROXY: 76.7 NG/ML
WBC # BLD: 8.4 K/UL (ref 4–11)

## 2020-12-30 PROCEDURE — 82306 VITAMIN D 25 HYDROXY: CPT

## 2020-12-30 PROCEDURE — 2700000000 HC OXYGEN THERAPY PER DAY

## 2020-12-30 PROCEDURE — 94640 AIRWAY INHALATION TREATMENT: CPT

## 2020-12-30 PROCEDURE — 2060000000 HC ICU INTERMEDIATE R&B

## 2020-12-30 PROCEDURE — 2580000003 HC RX 258: Performed by: FAMILY MEDICINE

## 2020-12-30 PROCEDURE — 83615 LACTATE (LD) (LDH) ENZYME: CPT

## 2020-12-30 PROCEDURE — 85384 FIBRINOGEN ACTIVITY: CPT

## 2020-12-30 PROCEDURE — 80053 COMPREHEN METABOLIC PANEL: CPT

## 2020-12-30 PROCEDURE — 99223 1ST HOSP IP/OBS HIGH 75: CPT | Performed by: INTERNAL MEDICINE

## 2020-12-30 PROCEDURE — 36415 COLL VENOUS BLD VENIPUNCTURE: CPT

## 2020-12-30 PROCEDURE — 6370000000 HC RX 637 (ALT 250 FOR IP): Performed by: FAMILY MEDICINE

## 2020-12-30 PROCEDURE — 82728 ASSAY OF FERRITIN: CPT

## 2020-12-30 PROCEDURE — 6360000002 HC RX W HCPCS: Performed by: STUDENT IN AN ORGANIZED HEALTH CARE EDUCATION/TRAINING PROGRAM

## 2020-12-30 PROCEDURE — 85025 COMPLETE CBC W/AUTO DIFF WBC: CPT

## 2020-12-30 PROCEDURE — 2500000003 HC RX 250 WO HCPCS: Performed by: FAMILY MEDICINE

## 2020-12-30 PROCEDURE — 83735 ASSAY OF MAGNESIUM: CPT

## 2020-12-30 PROCEDURE — 84145 PROCALCITONIN (PCT): CPT

## 2020-12-30 PROCEDURE — 85730 THROMBOPLASTIN TIME PARTIAL: CPT

## 2020-12-30 PROCEDURE — 85379 FIBRIN DEGRADATION QUANT: CPT

## 2020-12-30 PROCEDURE — 94761 N-INVAS EAR/PLS OXIMETRY MLT: CPT

## 2020-12-30 PROCEDURE — 83036 HEMOGLOBIN GLYCOSYLATED A1C: CPT

## 2020-12-30 PROCEDURE — 85610 PROTHROMBIN TIME: CPT

## 2020-12-30 PROCEDURE — 6370000000 HC RX 637 (ALT 250 FOR IP): Performed by: STUDENT IN AN ORGANIZED HEALTH CARE EDUCATION/TRAINING PROGRAM

## 2020-12-30 PROCEDURE — 2580000003 HC RX 258: Performed by: STUDENT IN AN ORGANIZED HEALTH CARE EDUCATION/TRAINING PROGRAM

## 2020-12-30 RX ORDER — METOPROLOL TARTRATE 50 MG/1
100 TABLET, FILM COATED ORAL 2 TIMES DAILY
Status: DISCONTINUED | OUTPATIENT
Start: 2020-12-30 | End: 2021-01-21

## 2020-12-30 RX ORDER — DEXAMETHASONE SODIUM PHOSPHATE 10 MG/ML
10 INJECTION, SOLUTION INTRAMUSCULAR; INTRAVENOUS DAILY
Status: DISCONTINUED | OUTPATIENT
Start: 2020-12-30 | End: 2020-12-30

## 2020-12-30 RX ORDER — SODIUM CHLORIDE 9 MG/ML
INJECTION, SOLUTION INTRAVENOUS PRN
Status: ACTIVE | OUTPATIENT
Start: 2020-12-30 | End: 2021-01-20

## 2020-12-30 RX ADMIN — REMDESIVIR 100 MG: 100 INJECTION, POWDER, LYOPHILIZED, FOR SOLUTION INTRAVENOUS at 21:46

## 2020-12-30 RX ADMIN — ACETAMINOPHEN 650 MG: 325 TABLET ORAL at 00:32

## 2020-12-30 RX ADMIN — ATORVASTATIN CALCIUM 40 MG: 40 TABLET, FILM COATED ORAL at 10:20

## 2020-12-30 RX ADMIN — ASPIRIN 81 MG 81 MG: 81 TABLET ORAL at 10:19

## 2020-12-30 RX ADMIN — ALBUTEROL SULFATE 2 PUFF: 90 AEROSOL, METERED RESPIRATORY (INHALATION) at 11:10

## 2020-12-30 RX ADMIN — ENOXAPARIN SODIUM 30 MG: 30 INJECTION SUBCUTANEOUS at 10:21

## 2020-12-30 RX ADMIN — REMDESIVIR 200 MG: 100 INJECTION, POWDER, LYOPHILIZED, FOR SOLUTION INTRAVENOUS at 00:28

## 2020-12-30 RX ADMIN — CEFTRIAXONE 1 G: 1 INJECTION, POWDER, FOR SOLUTION INTRAMUSCULAR; INTRAVENOUS at 02:02

## 2020-12-30 RX ADMIN — GUAIFENESIN AND DEXTROMETHORPHAN 5 ML: 100; 10 SYRUP ORAL at 06:41

## 2020-12-30 RX ADMIN — DULOXETINE HYDROCHLORIDE 60 MG: 60 CAPSULE, DELAYED RELEASE ORAL at 10:20

## 2020-12-30 RX ADMIN — IPRATROPIUM BROMIDE 2 PUFF: 17 AEROSOL, METERED RESPIRATORY (INHALATION) at 08:03

## 2020-12-30 RX ADMIN — LEVOTHYROXINE SODIUM 75 MCG: 0.07 TABLET ORAL at 05:10

## 2020-12-30 RX ADMIN — Medication 2000 UNITS: at 10:22

## 2020-12-30 RX ADMIN — METOPROLOL TARTRATE 100 MG: 50 TABLET, FILM COATED ORAL at 13:03

## 2020-12-30 RX ADMIN — IPRATROPIUM BROMIDE 2 PUFF: 17 AEROSOL, METERED RESPIRATORY (INHALATION) at 11:13

## 2020-12-30 RX ADMIN — ANASTROZOLE 1 MG: 1 TABLET ORAL at 11:18

## 2020-12-30 RX ADMIN — METOPROLOL TARTRATE 100 MG: 50 TABLET, FILM COATED ORAL at 21:41

## 2020-12-30 RX ADMIN — IPRATROPIUM BROMIDE 2 PUFF: 17 AEROSOL, METERED RESPIRATORY (INHALATION) at 19:56

## 2020-12-30 RX ADMIN — LISINOPRIL 30 MG: 10 TABLET ORAL at 13:03

## 2020-12-30 RX ADMIN — SODIUM CHLORIDE, PRESERVATIVE FREE 10 ML: 5 INJECTION INTRAVENOUS at 21:41

## 2020-12-30 RX ADMIN — ENOXAPARIN SODIUM 30 MG: 30 INJECTION SUBCUTANEOUS at 21:41

## 2020-12-30 RX ADMIN — ALBUTEROL SULFATE 2 PUFF: 90 AEROSOL, METERED RESPIRATORY (INHALATION) at 08:00

## 2020-12-30 RX ADMIN — INSULIN LISPRO 1 UNITS: 100 INJECTION, SOLUTION INTRAVENOUS; SUBCUTANEOUS at 21:42

## 2020-12-30 RX ADMIN — SODIUM CHLORIDE, PRESERVATIVE FREE 10 ML: 5 INJECTION INTRAVENOUS at 10:22

## 2020-12-30 RX ADMIN — ALBUTEROL SULFATE 2 PUFF: 90 AEROSOL, METERED RESPIRATORY (INHALATION) at 19:55

## 2020-12-30 ASSESSMENT — PAIN DESCRIPTION - LOCATION: LOCATION: HEAD

## 2020-12-30 ASSESSMENT — PAIN SCALES - GENERAL: PAINLEVEL_OUTOF10: 0

## 2020-12-30 ASSESSMENT — PAIN DESCRIPTION - FREQUENCY: FREQUENCY: CONTINUOUS

## 2020-12-30 ASSESSMENT — PAIN DESCRIPTION - DESCRIPTORS: DESCRIPTORS: ACHING

## 2020-12-30 ASSESSMENT — PAIN DESCRIPTION - PAIN TYPE: TYPE: ACUTE PAIN

## 2020-12-30 NOTE — CONSULTS
Infectious Diseases   Consult Note        Admission Date: 12/28/2020  Hospital Day: Hospital Day: 3   Attending: Raj Phan MD  Date of service: 12/30/20     Reason for admission: Pneumonia [J18.9]    Chief complaint/ Reason for consult: COVID-19 pneumonia, acute respiratory failure with hypoxia,       Microbiology:        I have reviewed allavailable micro lab data and cultures    · Blood culture (2/2) - collected on 12/28/2020: in process    SARS-CoV-2, PCR  COVID-19  Collected: 12/28/20 2142   Result status: Final   Resulting lab: [address] [place] [address] LAB   Reference range: Not Detected   Value: DETECTEDAbnormal     Comment: This test has been authorized by FDA under an   Emergency Use Authorization (EUA). Antibiotics and immunizations:       Current antibiotics: All antibiotics and their doses were reviewed by me    Recent Abx Admin                   cefTRIAXone (ROCEPHIN) 1 g IVPB in 50 mL D5W minibag (g) 1 g New Bag 12/30/20 0202    remdesivir 200 mg in sodium chloride 0.9 % 250 mL IVPB (mg) 200 mg New Bag 12/30/20 0028    azithromycin (ZITHROMAX) 500 mg in D5W 250ml addavial (mg) 500 mg New Bag 12/29/20 2331                  Immunization History: All immunization history was reviewed by me today. Immunization History   Administered Date(s) Administered    Pneumococcal Conjugate 13-valent ([id number]) 11/23/2015       Known drug allergies: All allergies were reviewed and updated    Allergies   Allergen Reactions    Mercury        Social history:     Social History:  All social andepidemiologic history was reviewed and updated by me today as needed. · Tobacco use:   reports that she has never smoked. She has never used smokeless tobacco.  · Alcohol use:   reports previous alcohol use. · Currently lives in: [place] [id number]  ·  reports no history of drug use.        Assessment:     The patient is a 63 y.o. old female who  has a past medical history of Arthritis, Decadron. I will order COVID-19 convalescent plasma to be transfused under FDA EUA. As COVID-19 convalescent plasma is currently on shortage and only 1 unit is currently being issued per patient by [facility] according to the recent nate,  I am ordering one unit of the plasma today for transfusion. Continue supplemental oxygen/high flow oxygen/BiPAP support as needed to maintain oxygen saturation above 92%. Continue to watch for new fever or productive cough or other signs of secondary bacterial infection. Encourage frequent change in posture and prone positioning as tolerated.  Encourage cough and deep breathing exercises.  Remdesivir Eva Sober) is the first and only treatment currently approved by FDA for treatment of COVID-19. (https://www.Food on the Table/). It is indicated for use in adult and pediatric patients of 15years of age of older and weighing at least 40 kilograms (about 88 pounds) for the treatment of COVID-19 requiring hospitalization. Luellen Sandifer should only be administered in a hospital or in a healthcare setting capable of providing acute care comparable to inpatient hospital care.  In hospitalized patients with COVID-19 who require supplemental Oxygen, NIH recommends Remdesivir 200 mg intravenously (IV) for 1 day, followed by remdesivir 100 mg IV for 4 days or until hospital discharge, whichever comes first (AI); or a combination of remdesivir (dose and duration as above) plus dexamethasone 6 mg IV or orally for up to 10 days or until hospital discharge (BIII); or if remdesivir cannot be used, use of dexamethasone instead (BIII).  In patients with COVID-19 admitted to the hospital without the need for supplemental oxygen and oxygen saturation >94% on room air, the IDSA panel suggests against the routine use of Remdesivir.    NIH recommends that Remdesivir therapy may be extended to up to 10 days, if no substantial data for the NIH panel to recommend either for or against its use. It should not be considered standard of care for the treatment of patients with COVID-19 according to the Rookopli 96 19 treatment panel.  Interleukin-6 inhibitor, Tocilizumab (Actemra) was previously proposed in severely ill patients, particularly those with very high interleukin-6 levels. However, NIH COVID-19 treatment  panel has determined that there is insufficient data to recommend either for or against the use of interleukin 1 inhibitors, interleukin-6 inhibitors and interferon beta for treatment of COVID 19 (CreditClassification.com.pt). Among patients who have been admitted to the hospital with COVID-19, the IDSA guideline panel suggests against the routine use of tocilizumab. (Conditional recommendation)   FDA has withdrawn its EUA for hydroxychloroquine as a treatment modality for COVID 19 on Kellee 15, 2020. In 2 small, uncontrolled studies conducted in [place] and [place], hydroxychloroquine and its congener, chloroquine were previously reported to be effective against COVID-19. However, International Society of Antimicrobial Chemotherapy subsequently declared that the trial did not meet the Societys expected standard.  (NAEEM Netw Open. 2020;3(4):v913382. FPW:91.2038/IGKUZIAAGGKPXMI.8363.5834). A large recently published observational study in [facility] did not show any benefit of hydroxychloroquine in reducing intubation rates or mortality in COVID 19 patients (N Engl J Med. 2020 May 7. doi: 10.1056/ZDJJky1930999). A large randomized controlled trial called the \"RECOVERY trial\"  conducted in the [facility] has not shown hydroxychloroquine to be beneficial in hospitalized COVID 19 patients.   Although, one retrospective study published in IJID indicated potential benefit of hydroxychloroquine and hydroxychloroquine plus azithromycin combination https://[employer]/) , it had several flaws including retrospective, nonrandomized design, concomitant use of steroids in most patients and exclusion of 10% of patients were still hospitalized at the time the study was ended, potentially skewing the results.  In a recently published study, hydroxychloroquine has also failed to show benefit as a postexposure prophylaxis for COVID 19 within 4 days after exposure (https://shea.org/. org/doi/full/10.1056/BLKExk7019976)   The NIH COVID-19 Treatment Guidelines Panel now recommends against using hydroxychloroquine plus azithromycin for the treatment of COVID-19, except in a clinical trial (AIII) (Augmi Labs.co.uk). The Panel recommends against the use of chloroquine or hydroxychloroquine for the treatment of COVID-19, except in a clinical trial (AII). The Panel recommends against the use of high-dose chloroquine (600 mg twice daily for 10 days) for the treatment of COVID-19 (AI).  Coadministration of Remdesvir and chloroquine phosphate or hydroxychloroquine sulfate is not recommended based on in vitro data demonstrating an antagonistic effect of chloroquine on the intracellular metabolic activation and antiviral activity of Remdesevir. (Reference: Remdesevir package insert)  · Lopinavir/ritonavir trial has failed to show benefits in the recently published trial in [facility] (Reference:N Engl J Med. 2020 Mar 18. doi: 10.1056/HQVJwf7998758). However, this trial was under-powered. Except in the context of a clinical trial, the COVID-19 Treatment Guidelines Panel recommends against using lopinavir/ritonavir (AI) or other HIV protease inhibitors (AIII) for the treatment of COVID-19.  (https://changRadical Studios.com/).  The IDSA guideline panel recommends against the use of the combination lopinavir/ritonavir among hospitalized patients with COVID-19. (Strong recommendation)  · The NIH panel recommends against the use of Interferons (giovanni or beta), Bruton's tyrosine kinase inhibitors and Janus kinase inhibitors (AIII) for the treatment of COVID-19, except in a clinical trial (CreditParantezification.com.pt). The Panel recommends against the use of mesenchymal stem cells for the treatment of COVID-19, except in a clinical trial (AII).  Recommendations for routine VTE prophylaxis are the same for hospitalized pregnant and nonpregnant patients (AIII).  There is insufficient data about use of vitamin C, vitamin D, zinc in COVID 19. NIH guidelines recommend against use of statins, ACE inhibitor/ARB's solely for the purpose of treating COVID 19, if not otherwise clinically indicated for other medical reasons. IDSA panel suggests against famotidine use for the sole purpose of treating COVID-19 in hospitalized patients, outside of the context of a clinical trial .    Continue strict droplet plus isolation currently being used for COVID-19 infections.  Recommend close vitals monitoring.  Continue oxygen support to try to maintain oxygen saturation above 92%   Fall and aspiration precautions.  Discussed above plan with RN   Continue close monitoring in COVID 19 unit. Drug Monitoring:    · Continue serial monitoring for antiviral toxicity as follows: CBC, CMP   · Continue to watch for following: new or worsening fever, hypotension, hives, lip swelling and redness or purulence at vascular access sites. I/v access Management:    · Continue to monitor i.v access sites for erythema, induration, discharge or tenderness. · As always, continue efforts to minimizetubes/lines/drains as clinically appropriate to reduce chances of line associated infections.     Current isolation precautions:    Currently active isolation(s): Droplet Plus Level of complexity of consult: High     Risk of Complications/Morbidity: High     · Illness(es)/ Infection present that pose threat to life/bodily function. · There is potential for severe exacerbation of infection/side effects of treatment. · Therapy requires intensive monitoring for antimicrobial agent toxicity. Thank you for involving me in the care of your patient. I will continue to follow. If you have any additional questions, please do not hesitate to contact me. Subjective:     Presenting complaint in ER:     Chief Complaint   Patient presents with    Shortness of Breath     c/o sore throat, cough, and right ear feels full x 2 days. Patient also c/o SOB. Patient currently eating cheeseburger. No sob noted. Patient 88% r/a         HPI: Abby Montoya is a 63 y.o. female patient, who was seen at the request of Dr. Hamzah Vega MD.    History was obtained from chart review and RN as patient is in acute respiratory failure with hypoxia. The patient was admitted on 12/28/2020. I have been consulted to see the patient for above mentioned reason(s). The patient has multiple medical comorbidities, and presented to the ER for 2-day history of nonproductive cough, sore throat, diarrhea, low-grade fever, loss of taste and smell and shortness of breath. In the ER, she had a low-grade temperature 99.9. The patient was admitted. Chest x-ray showed multifocal pneumonia. Blood cultures were sent. Urine Legionella and pneumococcal antigens were sent. COVID-19 test was sent. The patient was started on IV ceftriaxone azithromycin empirically by primary team.    COVID-19 test has come back positive. I have been asked for my opinion for management for this patient. Past Medical History: All past medical history reviewed today.     Past Medical History:   Diagnosis Date    Arthritis     Breast cancer ([facility] [address]     Depression     Hypertension     Obesity     Peptic ulcer disease     Scoliosis     Skin cancer     basal cell    Thyroid disease          Past Surgical History: All pastsurgical history was reviewed today. Past Surgical History:   Procedure Laterality Date    BACK SURGERY      BREAST BIOPSY      BREAST LUMPECTOMY      CARPAL TUNNEL RELEASE      DILATION AND CURETTAGE OF UTERUS      HYSTERECTOMY      OVARY REMOVAL      THYROID SURGERY           Family History: All family history was reviewed today. History reviewed. No pertinent family history. Medications: All current and past medications were reviewed. Medications Prior to Admission: lisinopril (PRINIVIL;ZESTRIL) 30 MG tablet, Take 30 mg by mouth daily  metoprolol succinate (TOPROL XL) 100 MG extended release tablet, Take 100 mg by mouth daily  amLODIPine (NORVASC) 5 MG tablet, Take 5 mg by mouth daily  dicyclomine (BENTYL) 20 MG tablet, Take 20 mg by mouth 3 times daily as needed  tiZANidine (ZANAFLEX) 2 MG tablet, Take 4 mg by mouth nightly  anastrozole (ARIMIDEX) 1 MG tablet, TAKE 1 TABLET BY MOUTH DAILY  alendronate (FOSAMAX) 70 MG tablet, Take 1 tablet by mouth every 7 days  Fexofenadine HCl (ALLEGRA PO), Take 1 tablet by mouth daily as needed   Calcium Carbonate-Vit D-Min (CALCIUM 1200 PO), Take by mouth Daily   Multiple Vitamins-Minerals (CENTRUM PO), Take by mouth  simvastatin (ZOCOR) 40 MG tablet, Take 40 mg by mouth nightly  levothyroxine (LEVOTHROID) 75 MCG tablet, Take 75 mcg by mouth Daily  DULoxetine (CYMBALTA) 60 MG extended release capsule, Take 60 mg by mouth daily  metFORMIN (GLUCOPHAGE) 500 MG tablet, Take 1,000 mg by mouth 2 times daily (with meals)   aspirin 81 MG tablet, Take 81 mg by mouth daily  naproxen (NAPROSYN) 375 MG tablet, Take 375 mg by mouth as needed   clobetasol (TEMOVATE) 0.05 % ointment, Apply topically once a week Apply topically 2 times daily.   [DISCONTINUED] silver sulfADIAZINE (SILVADENE) 1 % cream, Apply topically twice a day to open areas on right breast     lisinopril  30 mg Oral Daily    metoprolol tartrate  100 mg Oral BID    [START ON 12/31/2020] dexamethasone  20 mg Intravenous Daily    sodium chloride flush  10 mL Intravenous 2 times per day    aspirin  81 mg Oral Daily    levothyroxine  75 mcg Oral Daily    atorvastatin  40 mg Oral Daily    insulin lispro  0-6 Units Subcutaneous TID WC    insulin lispro  0-3 Units Subcutaneous Nightly    albuterol sulfate HFA  2 puff Inhalation 4x daily    And    ipratropium  2 puff Inhalation 4x daily    enoxaparin  30 mg Subcutaneous BID    Vitamin D  2,000 Units Oral Daily    anastrozole  1 mg Oral Daily    DULoxetine  60 mg Oral Daily    remdesivir IVPB  100 mg Intravenous Q24H          REVIEW OF SYSTEMS:   (Obtained with the help of RN as patient is in COVID-19 isolation)    Review of Systems   Unable to perform ROS: Acuity of condition       Objective:       PHYSICAL EXAM:      Vitals:   Vitals:    12/30/20 0340 12/30/20 0523 12/30/20 0800 12/30/20 1009   BP: (!) 151/80   130/78   Pulse: 109   103   Resp: 18   18   Temp: 97.3 °F (36.3 °C)      TempSrc: Axillary      SpO2: 95%  95% 94%   Weight:  218 lb 14.7 oz (99.3 kg)     Height:           Physical Exam      PHYSICAL EXAM:     In-person bedside physical examination deferred. Pursuant to the emergency declaration under the [address] [place] [address], [address] [place] [address] and the [employer] Act, this clinical encounter was conducted to provide necessary medical care. (Also consistent with new provisions and guidance offered by [facility] on March 18, 2020 in setting of COVID 19 outbreak and in order to preserve personal protective equipment in accordance with the flexibilities announced by CMS on March 30, 2020)   References: [email or web address]_18%20Telemed%20Guidance%20Updated%20March%2018. pdf?[phone]

## 2020-12-30 NOTE — PROGRESS NOTES
Hospitalist Progress Note      PCP: Tom Corado MD    Date of Admission: 12/28/2020      Hospital Course: admitted with non productive cough and hypoxia, with COVID 19 PNA     Subjective:    Patient seen and examined. Worsening hypoxia yesterday evening. Now on 15 L NRB  Feels tired. + dry cough - improving, no dyspnea at rest      Medications:  Reviewed    Infusion Medications    dextrose       Scheduled Medications    sodium chloride flush  10 mL Intravenous 2 times per day    aspirin  81 mg Oral Daily    levothyroxine  75 mcg Oral Daily    atorvastatin  40 mg Oral Daily    insulin lispro  0-6 Units Subcutaneous TID WC    insulin lispro  0-3 Units Subcutaneous Nightly    albuterol sulfate HFA  2 puff Inhalation 4x daily    And    ipratropium  2 puff Inhalation 4x daily    azithromycin  500 mg Intravenous Q24H    And    cefTRIAXone (ROCEPHIN) IV  1 g Intravenous Q24H    dexamethasone  6 mg Oral Daily    enoxaparin  30 mg Subcutaneous BID    Vitamin D  2,000 Units Oral Daily    anastrozole  1 mg Oral Daily    DULoxetine  60 mg Oral Daily    remdesivir IVPB  100 mg Intravenous Q24H     PRN Meds: sodium chloride flush, acetaminophen **OR** acetaminophen, glucose, dextrose, glucagon (rDNA), dextrose, ondansetron, guaiFENesin-dextromethorphan, sodium chloride      Intake/Output Summary (Last 24 hours) at 12/30/2020 0926  Last data filed at 12/30/2020 0045  Gross per 24 hour   Intake 1212 ml   Output 1250 ml   Net -38 ml       Physical Exam Performed:    BP (!) 151/80   Pulse 109   Temp 97.3 °F (36.3 °C) (Axillary)   Resp 18   Ht 5' 6\" (1.676 m)   Wt 218 lb 14.7 oz (99.3 kg)   SpO2 95%   BMI 35.33 kg/m²     General appearance: No apparent distress, alert and cooperative. HEENT: Conjunctivae/corneas clear, neck supple w/ full ROM  Respiratory:  Normal respiratory effort.  Faint bilateral rales  Cardiovascular: Regular rate and rhythm, normal S1/S2, no murmurs  Abdomen: Soft, non-tender, non-distended with normal bowel sounds.   Musculoskeletal: No edema bilaterally  Neurologic:  No new focal deficits  Psychiatric: Alert and oriented, normal insight  Capillary Refill: Brisk,< 3 seconds   Peripheral Pulses: +2 palpable, equal bilaterally       Labs:   Recent Labs     12/28/20 2140 12/29/20  0643 12/30/20  0544   WBC 4.4 4.7 8.4   HGB 12.0 11.8* 12.0   HCT 37.0 35.4* 35.3*    201 248     Recent Labs     12/28/20 2140 12/29/20  0643 12/30/20  0543    143 145   K 3.3* 3.4* 3.2*    109 108   CO2 23 22 26   BUN 15 13 11   CREATININE 1.0 0.8 0.7   CALCIUM 8.8 8.4 8.5     Recent Labs     12/28/20 2140 12/29/20  0643 12/30/20  0543   AST 52* 43* 49*   ALT 25 22 25   BILITOT <0.2 <0.2 <0.2   ALKPHOS 60 58 60     Recent Labs     12/30/20  0544   INR 1.00     Recent Labs     12/28/20 2140   Radha Reach <0.01       Urinalysis:    No results found for: Brittni Sweeney, [address], BACTERIA, RBCUA, BLOODU, [address], GLUCOSEU    Radiology:  XR CHEST PORTABLE   Final Result   Bilateral ill-defined airspace opacities could represent atypical pneumonia,   multifocal bacterial pneumonia or subsegmental atelectasis                 Assessment/Plan:    Active Hospital Problems    Diagnosis    Suspected COVID-19 virus infection [Z20.828]    Hypothyroidism [E03.9]    HLD (hyperlipidemia) [E78.5]    PCOS (polycystic ovarian syndrome) [E28.2]    Pneumonia [J18.9]     COVID 19 PNA  - c/w decadron - increase to IV 10 mg daily (needs total 10 day course)  - on remdesivir day 2/5  - ID consulted for consideration for convalescent plasma  - monitor inflammatory markers      Acute respiratory failure with hypoxia - worsening  - on 15 L NRB, wean as tolerated    HTN - elevated  - resume home bb, acei  - hold home norvasc, monitor BP    Hx breast cancer  - c/w arimidex  - oncology c/s    HLD  - c/w statin    Hypothroidism  - c/w levothyroxine     DMT2  - hold metformin  - ssi, monitor on steroids  - a1c - ordered    Depression  - c/w home meds    DVT Prophylaxis: lovenox bid  Diet: DIET LOW SODIUM 2 GM; Carb Control: 4 carb choices (60 gms)/meal  Code Status: Full Code      Dispo - continue care    Swetha Britton MD

## 2020-12-30 NOTE — CONSULTS
Hematology Oncology Daily Progress Note    Admit Date: 12/28/2020  Hospital day a few    Subjective:     Patient has complaints of ongoing RAYO/SOB and generalized weakness--denies CP. Medication side effects: none    Scheduled Meds:   lisinopril  30 mg Oral Daily    metoprolol tartrate  100 mg Oral BID    [START ON 12/31/2020] dexamethasone  20 mg Intravenous Daily    sodium chloride flush  10 mL Intravenous 2 times per day    aspirin  81 mg Oral Daily    levothyroxine  75 mcg Oral Daily    atorvastatin  40 mg Oral Daily    insulin lispro  0-6 Units Subcutaneous TID WC    insulin lispro  0-3 Units Subcutaneous Nightly    albuterol sulfate HFA  2 puff Inhalation 4x daily    And    ipratropium  2 puff Inhalation 4x daily    enoxaparin  30 mg Subcutaneous BID    Vitamin D  2,000 Units Oral Daily    anastrozole  1 mg Oral Daily    DULoxetine  60 mg Oral Daily    remdesivir IVPB  100 mg Intravenous Q24H     Continuous Infusions:   dextrose       PRN Meds:sodium chloride flush, acetaminophen **OR** acetaminophen, glucose, dextrose, glucagon (rDNA), dextrose, ondansetron, guaiFENesin-dextromethorphan, sodium chloride    Review of Systems  Pertinent items are noted in HPI. REVIEW OF SYSTEMS:         · Constitutional: Denies fever, sweats, weight loss     · Eyes: No visual changes or diplopia. No scleral icterus. · ENT: No Headaches, hearing loss or vertigo. No mouth sores or sore throat. · Cardiovascular: No chest pain, dyspnea on exertion, palpitations or loss of consciousness. · Respiratory: No cough or wheezing, no sputum production. No hemoptysis. .    · Gastrointestinal: No abdominal pain, appetite loss, blood in stools. No change in bowel habits. · Genitourinary: No dysuria, trouble voiding, or hematuria. · Musculoskeletal:  Generalized weakness. No joint complaints. · Integumentary: No rash or pruritis. · Neurological: No headache, diplopia. No change in gait, balance, or coordination. No paresthesias. · Endocrine: No temperature intolerance. No excessive thirst, fluid intake, or urination. · Hematologic/Lymphatic: No abnormal bruising or ecchymoses, blood clots or swollen lymph nodes. · Allergic/Immunologic: No nasal congestion or hives. ·     Objective:     Patient Vitals for the past 8 hrs:   BP Temp Temp src Pulse Resp SpO2   12/30/20 1128 124/81 98.3 °F (36.8 °C) Oral 109 17 91 %   12/30/20 1110 -- -- -- -- -- 94 %   12/30/20 1009 130/78 -- -- 103 18 94 %     I/O last 3 completed shifts: In: 4393 [P.O.:1200; I.V.:10; IV Piggyback:242]  Out: 1250 [Urine:1250]  No intake/output data recorded.     /81   Pulse 109   Temp 98.3 °F (36.8 °C) (Oral)   Resp 17   Ht 5' 6\" (1.676 m)   Wt 218 lb 14.7 oz (99.3 kg)   SpO2 91%   BMI 35.33 kg/m²     General Appearance:    Alert, cooperative, no distress, appears stated age   Head:    Normocephalic, without obvious abnormality, atraumatic   Eyes:    PERRL, conjunctiva/corneas clear, EOM's intact, fundi     benign, both eyes        Ears:    Normal TM's and external ear canals, both ears   Nose:   Nares normal, septum midline, mucosa normal, no drainage    or sinus tenderness   Throat:   Lips, mucosa, and tongue normal; teeth and gums normal   Neck:   Supple, symmetrical, trachea midline, no adenopathy;        thyroid:  No enlargement/tenderness/nodules; no carotid    bruit or JVD   Back:     Symmetric, no curvature, ROM normal, no CVA tenderness   Lungs:     Few scattered crackles   Chest wall:    No tenderness or deformity   Heart:    Regular rate and rhythm, S1 and S2 normal, no murmur, rub   or gallop   Abdomen:     Soft, non-tender, bowel sounds active all four quadrants,     no masses, no organomegaly           Extremities:   Extremities normal, atraumatic, no cyanosis or edema   Pulses:   2+ and symmetric all extremities   Skin:   Skin color, texture, turgor normal, no rashes or lesions   Lymph nodes:   Cervical, supraclavicular, and axillary nodes normal   Neurologic:   CNII-XII intact. Normal strength, sensation and reflexes       throughout       Data Review  CBC:   Lab Results   Component Value Date    WBC 8.4 12/30/2020    RBC 3.87 12/30/2020       Assessment:     Active Problems:    Pneumonia due to organism    Suspected COVID-19 virus infection    Hypothyroidism    HLD (hyperlipidemia)    PCOS (polycystic ovarian syndrome)    Acute respiratory failure with hypoxia (HCC)    Pneumonia due to COVID-19 virus    Elevated LDH    Elevated AST (SGOT)    Elevated ferritin    Elevated d-dimer    History of depression    Essential hypertension    Class 2 obesity due to excess calories with body mass index (BMI) of 35.0 to 35.9 in adult  Resolved Problems:    * No resolved hospital problems. *      Plan:     1. Breast cancer. She should continue her adjuvant Arimidex. It is okay to continue this while she is being treated for Covid. She should follow-up with Dr. Leticia Alexander as previously scheduled. 2.  Covid\pneumonia. I will defer to the primary team.  She is on steroids and ATBs    Thank you for the consultation. We will sign off. Please re-consult if questions arise.         Electronically signed by Oscar Ramos MD on 12/30/2020 at 4:04 PM

## 2020-12-31 LAB
A/G RATIO: 1.4 (ref 1.1–2.2)
ALBUMIN SERPL-MCNC: 3.2 G/DL (ref 3.4–5)
ALP BLD-CCNC: 65 U/L (ref 40–129)
ALT SERPL-CCNC: 30 U/L (ref 10–40)
ANION GAP SERPL CALCULATED.3IONS-SCNC: 9 MMOL/L (ref 3–16)
APTT: 29.7 SEC (ref 24.2–36.2)
AST SERPL-CCNC: 51 U/L (ref 15–37)
BASOPHILS ABSOLUTE: 0 K/UL (ref 0–0.2)
BASOPHILS RELATIVE PERCENT: 0.2 %
BILIRUB SERPL-MCNC: <0.2 MG/DL (ref 0–1)
BLOOD BANK DISPENSE STATUS: NORMAL
BLOOD BANK PRODUCT CODE: NORMAL
BPU ID: NORMAL
BUN BLDV-MCNC: 19 MG/DL (ref 7–20)
C-REACTIVE PROTEIN: 55.9 MG/L (ref 0–5.1)
CALCIUM SERPL-MCNC: 8.4 MG/DL (ref 8.3–10.6)
CHLORIDE BLD-SCNC: 105 MMOL/L (ref 99–110)
CO2: 29 MMOL/L (ref 21–32)
CREAT SERPL-MCNC: 0.6 MG/DL (ref 0.6–1.2)
D DIMER: 4106 NG/ML DDU (ref 0–229)
DESCRIPTION BLOOD BANK: NORMAL
EKG ATRIAL RATE: 86 BPM
EKG ATRIAL RATE: 98 BPM
EKG DIAGNOSIS: NORMAL
EKG DIAGNOSIS: NORMAL
EKG P AXIS: 40 DEGREES
EKG P AXIS: 60 DEGREES
EKG P-R INTERVAL: 120 MS
EKG P-R INTERVAL: 156 MS
EKG Q-T INTERVAL: 348 MS
EKG Q-T INTERVAL: 356 MS
EKG QRS DURATION: 72 MS
EKG QRS DURATION: 88 MS
EKG QTC CALCULATION (BAZETT): 426 MS
EKG QTC CALCULATION (BAZETT): 444 MS
EKG R AXIS: 33 DEGREES
EKG R AXIS: 68 DEGREES
EKG T AXIS: 49 DEGREES
EKG T AXIS: 52 DEGREES
EKG VENTRICULAR RATE: 86 BPM
EKG VENTRICULAR RATE: 98 BPM
EOSINOPHILS ABSOLUTE: 0 K/UL (ref 0–0.6)
EOSINOPHILS RELATIVE PERCENT: 0 %
FIBRINOGEN: 583 MG/DL (ref 200–397)
GFR AFRICAN AMERICAN: >60
GFR NON-AFRICAN AMERICAN: >60
GLOBULIN: 2.3 G/DL
GLUCOSE BLD-MCNC: 110 MG/DL (ref 70–99)
GLUCOSE BLD-MCNC: 112 MG/DL (ref 70–99)
GLUCOSE BLD-MCNC: 128 MG/DL (ref 70–99)
GLUCOSE BLD-MCNC: 200 MG/DL (ref 70–99)
GLUCOSE BLD-MCNC: 98 MG/DL (ref 70–99)
HCT VFR BLD CALC: 37.3 % (ref 36–48)
HEMOGLOBIN: 12.3 G/DL (ref 12–16)
INR BLD: 1.06 (ref 0.86–1.14)
LACTATE DEHYDROGENASE: 573 U/L (ref 100–190)
LYMPHOCYTES ABSOLUTE: 0.8 K/UL (ref 1–5.1)
LYMPHOCYTES RELATIVE PERCENT: 9.2 %
MCH RBC QN AUTO: 30.4 PG (ref 26–34)
MCHC RBC AUTO-ENTMCNC: 33 G/DL (ref 31–36)
MCV RBC AUTO: 92.1 FL (ref 80–100)
MONOCYTES ABSOLUTE: 1.1 K/UL (ref 0–1.3)
MONOCYTES RELATIVE PERCENT: 13.1 %
NEUTROPHILS ABSOLUTE: 6.6 K/UL (ref 1.7–7.7)
NEUTROPHILS RELATIVE PERCENT: 77.5 %
PDW BLD-RTO: 14.7 % (ref 12.4–15.4)
PERFORMED ON: ABNORMAL
PLATELET # BLD: 323 K/UL (ref 135–450)
PMV BLD AUTO: 8.4 FL (ref 5–10.5)
POTASSIUM REFLEX MAGNESIUM: 3.8 MMOL/L (ref 3.5–5.1)
PROCALCITONIN: 0.09 NG/ML (ref 0–0.15)
PROTHROMBIN TIME: 12.3 SEC (ref 10–13.2)
RBC # BLD: 4.05 M/UL (ref 4–5.2)
SODIUM BLD-SCNC: 143 MMOL/L (ref 136–145)
TOTAL PROTEIN: 5.5 G/DL (ref 6.4–8.2)
WBC # BLD: 8.5 K/UL (ref 4–11)

## 2020-12-31 PROCEDURE — 93010 ELECTROCARDIOGRAM REPORT: CPT | Performed by: INTERNAL MEDICINE

## 2020-12-31 PROCEDURE — 80053 COMPREHEN METABOLIC PANEL: CPT

## 2020-12-31 PROCEDURE — 2700000000 HC OXYGEN THERAPY PER DAY

## 2020-12-31 PROCEDURE — 83615 LACTATE (LD) (LDH) ENZYME: CPT

## 2020-12-31 PROCEDURE — 2580000003 HC RX 258: Performed by: INTERNAL MEDICINE

## 2020-12-31 PROCEDURE — 6360000002 HC RX W HCPCS: Performed by: INTERNAL MEDICINE

## 2020-12-31 PROCEDURE — 2500000003 HC RX 250 WO HCPCS: Performed by: FAMILY MEDICINE

## 2020-12-31 PROCEDURE — 85730 THROMBOPLASTIN TIME PARTIAL: CPT

## 2020-12-31 PROCEDURE — 2580000003 HC RX 258: Performed by: FAMILY MEDICINE

## 2020-12-31 PROCEDURE — 85379 FIBRIN DEGRADATION QUANT: CPT

## 2020-12-31 PROCEDURE — 85025 COMPLETE CBC W/AUTO DIFF WBC: CPT

## 2020-12-31 PROCEDURE — 36415 COLL VENOUS BLD VENIPUNCTURE: CPT

## 2020-12-31 PROCEDURE — 94761 N-INVAS EAR/PLS OXIMETRY MLT: CPT

## 2020-12-31 PROCEDURE — 6370000000 HC RX 637 (ALT 250 FOR IP): Performed by: FAMILY MEDICINE

## 2020-12-31 PROCEDURE — 85610 PROTHROMBIN TIME: CPT

## 2020-12-31 PROCEDURE — 94640 AIRWAY INHALATION TREATMENT: CPT

## 2020-12-31 PROCEDURE — 6370000000 HC RX 637 (ALT 250 FOR IP): Performed by: STUDENT IN AN ORGANIZED HEALTH CARE EDUCATION/TRAINING PROGRAM

## 2020-12-31 PROCEDURE — 2060000000 HC ICU INTERMEDIATE R&B

## 2020-12-31 PROCEDURE — 6360000002 HC RX W HCPCS: Performed by: STUDENT IN AN ORGANIZED HEALTH CARE EDUCATION/TRAINING PROGRAM

## 2020-12-31 PROCEDURE — 2580000003 HC RX 258: Performed by: STUDENT IN AN ORGANIZED HEALTH CARE EDUCATION/TRAINING PROGRAM

## 2020-12-31 PROCEDURE — 85384 FIBRINOGEN ACTIVITY: CPT

## 2020-12-31 PROCEDURE — 84145 PROCALCITONIN (PCT): CPT

## 2020-12-31 PROCEDURE — 99223 1ST HOSP IP/OBS HIGH 75: CPT | Performed by: INTERNAL MEDICINE

## 2020-12-31 PROCEDURE — 86140 C-REACTIVE PROTEIN: CPT

## 2020-12-31 RX ORDER — AMLODIPINE BESYLATE 5 MG/1
5 TABLET ORAL DAILY
Status: DISCONTINUED | OUTPATIENT
Start: 2020-12-31 | End: 2021-01-23 | Stop reason: HOSPADM

## 2020-12-31 RX ORDER — DEXAMETHASONE SODIUM PHOSPHATE 10 MG/ML
10 INJECTION, SOLUTION INTRAMUSCULAR; INTRAVENOUS DAILY
Status: COMPLETED | OUTPATIENT
Start: 2021-01-06 | End: 2021-01-10

## 2020-12-31 RX ADMIN — ENOXAPARIN SODIUM 30 MG: 30 INJECTION SUBCUTANEOUS at 10:39

## 2020-12-31 RX ADMIN — ENOXAPARIN SODIUM 30 MG: 30 INJECTION SUBCUTANEOUS at 20:50

## 2020-12-31 RX ADMIN — IPRATROPIUM BROMIDE 2 PUFF: 17 AEROSOL, METERED RESPIRATORY (INHALATION) at 16:00

## 2020-12-31 RX ADMIN — METOPROLOL TARTRATE 100 MG: 50 TABLET, FILM COATED ORAL at 10:40

## 2020-12-31 RX ADMIN — DULOXETINE HYDROCHLORIDE 60 MG: 60 CAPSULE, DELAYED RELEASE ORAL at 10:39

## 2020-12-31 RX ADMIN — LEVOTHYROXINE SODIUM 75 MCG: 0.07 TABLET ORAL at 06:03

## 2020-12-31 RX ADMIN — ALBUTEROL SULFATE 2 PUFF: 90 AEROSOL, METERED RESPIRATORY (INHALATION) at 16:00

## 2020-12-31 RX ADMIN — DEXAMETHASONE SODIUM PHOSPHATE 20 MG: 4 INJECTION, SOLUTION INTRA-ARTICULAR; INTRALESIONAL; INTRAMUSCULAR; INTRAVENOUS; SOFT TISSUE at 13:01

## 2020-12-31 RX ADMIN — METOPROLOL TARTRATE 100 MG: 50 TABLET, FILM COATED ORAL at 20:51

## 2020-12-31 RX ADMIN — SODIUM CHLORIDE, PRESERVATIVE FREE 10 ML: 5 INJECTION INTRAVENOUS at 20:51

## 2020-12-31 RX ADMIN — ALBUTEROL SULFATE 2 PUFF: 90 AEROSOL, METERED RESPIRATORY (INHALATION) at 12:30

## 2020-12-31 RX ADMIN — Medication 2000 UNITS: at 10:40

## 2020-12-31 RX ADMIN — IPRATROPIUM BROMIDE 2 PUFF: 17 AEROSOL, METERED RESPIRATORY (INHALATION) at 12:33

## 2020-12-31 RX ADMIN — AMLODIPINE BESYLATE 5 MG: 5 TABLET ORAL at 18:27

## 2020-12-31 RX ADMIN — IPRATROPIUM BROMIDE 2 PUFF: 17 AEROSOL, METERED RESPIRATORY (INHALATION) at 20:23

## 2020-12-31 RX ADMIN — REMDESIVIR 100 MG: 100 INJECTION, POWDER, LYOPHILIZED, FOR SOLUTION INTRAVENOUS at 20:51

## 2020-12-31 RX ADMIN — ALBUTEROL SULFATE 2 PUFF: 90 AEROSOL, METERED RESPIRATORY (INHALATION) at 07:42

## 2020-12-31 RX ADMIN — ASPIRIN 81 MG 81 MG: 81 TABLET ORAL at 10:39

## 2020-12-31 RX ADMIN — INSULIN LISPRO 1 UNITS: 100 INJECTION, SOLUTION INTRAVENOUS; SUBCUTANEOUS at 20:51

## 2020-12-31 RX ADMIN — ALBUTEROL SULFATE 2 PUFF: 90 AEROSOL, METERED RESPIRATORY (INHALATION) at 20:23

## 2020-12-31 RX ADMIN — ANASTROZOLE 1 MG: 1 TABLET ORAL at 10:38

## 2020-12-31 RX ADMIN — SODIUM CHLORIDE, PRESERVATIVE FREE 10 ML: 5 INJECTION INTRAVENOUS at 10:40

## 2020-12-31 RX ADMIN — ONDANSETRON 4 MG: 2 INJECTION INTRAMUSCULAR; INTRAVENOUS at 18:31

## 2020-12-31 RX ADMIN — LISINOPRIL 30 MG: 10 TABLET ORAL at 10:40

## 2020-12-31 RX ADMIN — IPRATROPIUM BROMIDE 2 PUFF: 17 AEROSOL, METERED RESPIRATORY (INHALATION) at 07:45

## 2020-12-31 RX ADMIN — ATORVASTATIN CALCIUM 40 MG: 40 TABLET, FILM COATED ORAL at 10:39

## 2020-12-31 ASSESSMENT — PAIN SCALES - GENERAL
PAINLEVEL_OUTOF10: 0

## 2020-12-31 NOTE — CARE COORDINATION
Per chart review - patient with increased O2 demands. Now on 40L vapotherm + non rebreather. Continue to monitor for new home needs and oxygen needs.  Electronically signed by Claudette Chowdhury RN Case Management [phone] on 12/31/2020 at 10:09 AM

## 2020-12-31 NOTE — CONSULTS
IVPB  100 mg Intravenous Q24H       Continuous Infusions:   sodium chloride      dextrose         PRN Meds:  sodium chloride, sodium chloride flush, acetaminophen **OR** acetaminophen, glucose, dextrose, glucagon (rDNA), dextrose, ondansetron, guaiFENesin-dextromethorphan, sodium chloride    ALLERGIES:  Patient is allergic to mercury. REVIEW OF SYSTEMS:  Constitutional: has been feeling ill for about 6 days  HENT: Negative for sore throat, ear pressure, difficulty in swallowing   Eyes: Negative for redness   Respiratory: SOB, coughing  Cardiovascular: Negative for chest pain  Gastrointestinal: Negative for vomiting, diarrhea   Genitourinary: Negative for hematuria, negative for dysuria  Musculoskeletal: Negative for arthralgias   Skin: Negative for rash  Neurological: Negative for syncope  Hematological: Negative for adenopathy  Extremities:  Negative for swelling    PHYSICAL EXAM:  Blood pressure (!) 135/92, pulse 92, temperature 98 °F (36.7 °C), temperature source Oral, resp. rate 22, height 5' 6\" (1.676 m), weight 220 lb 14.4 oz (100.2 kg), SpO2 91 %.'  Gen: Mild to moderate distress  Eyes: PERRL. No sclera icterus. No conjunctival injection. ENT: No discharge. Pharynx clear. Neck: Trachea midline. No obvious mass. Resp: No accessory muscle use. Crackles, mild conversational dyspnea   CV: Regular rate. Regular rhythm. No murmur or rub. GI: Non-tender. Non-distended. No hernia. BS present. Skin: Warm and dry. No nodule on exposed extremities. Lymph: No cervical LAD. No supraclavicular LAD. M/S: No cyanosis. No joint deformity. Neuro: Awake. Alert. Moves all four extremities.    EXT:   no edema, no clubbing    LABS:  CBC:   Recent Labs     12/29/20  0643 12/30/20  0544 12/31/20  1225   WBC 4.7 8.4 8.5   HGB 11.8* 12.0 12.3   HCT 35.4* 35.3* 37.3   MCV 91.8 91.3 92.1    248 323     BMP:   Recent Labs     12/29/20  0643 12/30/20  0543 12/31/20  1225    145 143   K 3.4* 3.2* 3.8   CL

## 2020-12-31 NOTE — PROGRESS NOTES
Hospitalist Progress Note      PCP: Jaky Hansen MD    Date of Admission: 12/28/2020      Hospital Course: admitted with non productive cough and hypoxia, with COVID 19 PNA     Subjective:    Patient seen and examined. Worsening hypoxia, now on vapotherm 40 l., + NRB  Feels very tired  + cough     Medications:  Reviewed    Infusion Medications    sodium chloride      dextrose       Scheduled Medications    [START ON 1/6/2021] dexamethasone  10 mg Intravenous Daily    lisinopril  30 mg Oral Daily    metoprolol tartrate  100 mg Oral BID    dexamethasone  20 mg Intravenous Daily    sodium chloride flush  10 mL Intravenous 2 times per day    aspirin  81 mg Oral Daily    levothyroxine  75 mcg Oral Daily    atorvastatin  40 mg Oral Daily    insulin lispro  0-6 Units Subcutaneous TID WC    insulin lispro  0-3 Units Subcutaneous Nightly    albuterol sulfate HFA  2 puff Inhalation 4x daily    And    ipratropium  2 puff Inhalation 4x daily    enoxaparin  30 mg Subcutaneous BID    Vitamin D  2,000 Units Oral Daily    anastrozole  1 mg Oral Daily    DULoxetine  60 mg Oral Daily    remdesivir IVPB  100 mg Intravenous Q24H     PRN Meds: sodium chloride, sodium chloride flush, acetaminophen **OR** acetaminophen, glucose, dextrose, glucagon (rDNA), dextrose, ondansetron, guaiFENesin-dextromethorphan, sodium chloride      Intake/Output Summary (Last 24 hours) at 12/31/2020 1640  Last data filed at 12/31/2020 1428  Gross per 24 hour   Intake 1403.75 ml   Output 2475 ml   Net -1071.25 ml       Physical Exam Performed:    BP (!) 150/93   Pulse 91   Temp 98 °F (36.7 °C) (Oral)   Resp 20   Ht 5' 6\" (1.676 m)   Wt 220 lb 14.4 oz (100.2 kg)   SpO2 95%   BMI 35.65 kg/m²     General appearance: No apparent distress, alert and cooperative. HEENT: Conjunctivae/corneas clear, neck supple w/ full ROM  Respiratory:  Normal respiratory effort.  Faint bilateral rales  Cardiovascular: Regular rate and rhythm, [facility] care from [facility]. Received pt  Resting in bed, eyes closed, easily awaken. Central line c/d/i. Dressing on shoulder and finger c/d/i. Upper extremities with abrasions, skin very dry. Pt outgoing RN, pt walked in the hallway once today, activity well tolerated. 1700 Pt ambulated to bathroom to void. Mepilex on sacrum area c/d/i. Denies pain but says she sees crawling things in front of her and that she's too sleepy today could hardly move her feet. Per son at bedside, must be the dilaudid because she does not really take so much pain medication PTA, pt agreed so. Pt falling asleep in the middle of conversation. Per pt she prefers Percocet for pain. 1830 Pt resting in bed, eyes closed. No pressure ulcer at the back. IJ dressing c/d/i. Midline incision on abdomen open to air, no swelling noted. Pain under control. Pt passing gas, active bowel sounds. Will pass on to incoming Rn pt concern on Dilaudid. normal S1/S2, no murmurs  Abdomen: Soft, non-tender, non-distended with normal bowel sounds.   Musculoskeletal: No edema bilaterally  Neurologic:  No new focal deficits  Psychiatric: Alert and oriented, normal insight  Capillary Refill: Brisk,< 3 seconds   Peripheral Pulses: +2 palpable, equal bilaterally       Labs:   Recent Labs     12/29/20  0643 12/30/20  0544 12/31/20  1225   WBC 4.7 8.4 8.5   HGB 11.8* 12.0 12.3   HCT 35.4* 35.3* 37.3    248 323     Recent Labs     12/29/20  0643 12/30/20  0543 12/31/20  1225    145 143   K 3.4* 3.2* 3.8    108 105   CO2 22 26 29   BUN 13 11 19   CREATININE 0.8 0.7 0.6   CALCIUM 8.4 8.5 8.4     Recent Labs     12/29/20  0643 12/30/20  0543 12/31/20  1225   AST 43* 49* 51*   ALT 22 25 30   BILITOT <0.2 <0.2 <0.2   ALKPHOS 58 60 65     Recent Labs     12/30/20  0544 12/31/20  1225   INR 1.00 1.06     Recent Labs     12/28/20  2140   TROPONINI <0.01       Urinalysis:    No results found for: Parthenia Fly, [address], BACTERIA, RBCUA, BLOODU, SPECGRAV, GLUCOSEU    Radiology:  XR CHEST PORTABLE   Final Result   Bilateral ill-defined airspace opacities could represent atypical pneumonia,   multifocal bacterial pneumonia or subsegmental atelectasis                 Assessment/Plan:    Active Hospital Problems    Diagnosis    Acute respiratory failure with hypoxia ([facility] [address] [J96.01]    2019 novel coronavirus-infected pneumonia (NCIP) [U07.1, J12.89]    Elevated LDH [R74.02]    Elevated AST (SGOT) [R74.01]    Elevated ferritin [R79.89]    Elevated d-dimer [R79.89]    History of depression [Z86.59]    Essential hypertension [I10]    Class 2 obesity due to excess calories with body mass index (BMI) of 35.0 to 35.9 in adult [E66.09, Z68.35]    Suspected COVID-19 virus infection [Z20.828]    Hypothyroidism [E03.9]    HLD (hyperlipidemia) [E78.5]    PCOS (polycystic ovarian syndrome) [E28.2]    Pneumonia due to organism [J18.9]     COVID 19 PNA  - c/w decadron  Increased to 20 mg x 5 days then 10 mg x 5 days  - on remdesivir day 3/5  - s/p convalescent plasma 12/30/2020  - monitor inflammatory markers    Acute respiratory failure with hypoxia -   - worsening, now on vapotherm + NRB  - consult pulmonology     HTN - elevated  - c/w bb, acei  - resume home norvasc    Hx breast cancer  - c/w arimidex    HLD  - c/w statin    Hypothroidism  - c/w levothyroxine     DMT2  - hold metformin  - ssi, monitor on steroids  - a1c - 6.0    Depression  - c/w home meds    DVT Prophylaxis: lovenox bid  Diet: DIET LOW SODIUM 2 GM; Carb Control: 4 carb choices (60 gms)/meal  Code Status: Full Code      Dispo - continue care    Corina Olsen MD

## 2021-01-01 LAB
A/G RATIO: 1 (ref 1.1–2.2)
ALBUMIN SERPL-MCNC: 2.8 G/DL (ref 3.4–5)
ALP BLD-CCNC: 62 U/L (ref 40–129)
ALT SERPL-CCNC: 30 U/L (ref 10–40)
ANION GAP SERPL CALCULATED.3IONS-SCNC: 10 MMOL/L (ref 3–16)
APTT: 26.9 SEC (ref 24.2–36.2)
AST SERPL-CCNC: 43 U/L (ref 15–37)
BILIRUB SERPL-MCNC: 0.3 MG/DL (ref 0–1)
BUN BLDV-MCNC: 22 MG/DL (ref 7–20)
CALCIUM SERPL-MCNC: 8.4 MG/DL (ref 8.3–10.6)
CHLORIDE BLD-SCNC: 105 MMOL/L (ref 99–110)
CO2: 30 MMOL/L (ref 21–32)
CREAT SERPL-MCNC: 0.7 MG/DL (ref 0.6–1.2)
FIBRINOGEN: 539 MG/DL (ref 200–397)
GFR AFRICAN AMERICAN: >60
GFR NON-AFRICAN AMERICAN: >60
GLOBULIN: 2.9 G/DL
GLUCOSE BLD-MCNC: 109 MG/DL (ref 70–99)
GLUCOSE BLD-MCNC: 109 MG/DL (ref 70–99)
GLUCOSE BLD-MCNC: 116 MG/DL (ref 70–99)
GLUCOSE BLD-MCNC: 158 MG/DL (ref 70–99)
GLUCOSE BLD-MCNC: 179 MG/DL (ref 70–99)
INR BLD: 1.08 (ref 0.86–1.14)
PERFORMED ON: ABNORMAL
POTASSIUM REFLEX MAGNESIUM: 4.1 MMOL/L (ref 3.5–5.1)
PROTHROMBIN TIME: 12.5 SEC (ref 10–13.2)
SODIUM BLD-SCNC: 145 MMOL/L (ref 136–145)
TOTAL PROTEIN: 5.7 G/DL (ref 6.4–8.2)

## 2021-01-01 PROCEDURE — 2580000003 HC RX 258: Performed by: INTERNAL MEDICINE

## 2021-01-01 PROCEDURE — 80053 COMPREHEN METABOLIC PANEL: CPT

## 2021-01-01 PROCEDURE — 6360000002 HC RX W HCPCS: Performed by: INTERNAL MEDICINE

## 2021-01-01 PROCEDURE — 6370000000 HC RX 637 (ALT 250 FOR IP): Performed by: STUDENT IN AN ORGANIZED HEALTH CARE EDUCATION/TRAINING PROGRAM

## 2021-01-01 PROCEDURE — 85730 THROMBOPLASTIN TIME PARTIAL: CPT

## 2021-01-01 PROCEDURE — 2700000000 HC OXYGEN THERAPY PER DAY

## 2021-01-01 PROCEDURE — 94761 N-INVAS EAR/PLS OXIMETRY MLT: CPT

## 2021-01-01 PROCEDURE — 2500000003 HC RX 250 WO HCPCS: Performed by: FAMILY MEDICINE

## 2021-01-01 PROCEDURE — 2580000003 HC RX 258: Performed by: STUDENT IN AN ORGANIZED HEALTH CARE EDUCATION/TRAINING PROGRAM

## 2021-01-01 PROCEDURE — 85384 FIBRINOGEN ACTIVITY: CPT

## 2021-01-01 PROCEDURE — 2060000000 HC ICU INTERMEDIATE R&B

## 2021-01-01 PROCEDURE — 2580000003 HC RX 258: Performed by: FAMILY MEDICINE

## 2021-01-01 PROCEDURE — 94640 AIRWAY INHALATION TREATMENT: CPT

## 2021-01-01 PROCEDURE — 85610 PROTHROMBIN TIME: CPT

## 2021-01-01 PROCEDURE — 36415 COLL VENOUS BLD VENIPUNCTURE: CPT

## 2021-01-01 PROCEDURE — 6370000000 HC RX 637 (ALT 250 FOR IP): Performed by: FAMILY MEDICINE

## 2021-01-01 PROCEDURE — 6360000002 HC RX W HCPCS: Performed by: STUDENT IN AN ORGANIZED HEALTH CARE EDUCATION/TRAINING PROGRAM

## 2021-01-01 RX ADMIN — Medication 2000 UNITS: at 08:44

## 2021-01-01 RX ADMIN — METOPROLOL TARTRATE 100 MG: 50 TABLET, FILM COATED ORAL at 08:45

## 2021-01-01 RX ADMIN — LISINOPRIL 30 MG: 10 TABLET ORAL at 08:44

## 2021-01-01 RX ADMIN — ENOXAPARIN SODIUM 40 MG: 40 INJECTION SUBCUTANEOUS at 21:25

## 2021-01-01 RX ADMIN — LEVOTHYROXINE SODIUM 75 MCG: 0.07 TABLET ORAL at 06:28

## 2021-01-01 RX ADMIN — ANASTROZOLE 1 MG: 1 TABLET ORAL at 08:44

## 2021-01-01 RX ADMIN — IPRATROPIUM BROMIDE 2 PUFF: 17 AEROSOL, METERED RESPIRATORY (INHALATION) at 16:37

## 2021-01-01 RX ADMIN — INSULIN LISPRO 1 UNITS: 100 INJECTION, SOLUTION INTRAVENOUS; SUBCUTANEOUS at 21:26

## 2021-01-01 RX ADMIN — ALBUTEROL SULFATE 2 PUFF: 90 AEROSOL, METERED RESPIRATORY (INHALATION) at 16:36

## 2021-01-01 RX ADMIN — SODIUM CHLORIDE, PRESERVATIVE FREE 10 ML: 5 INJECTION INTRAVENOUS at 08:42

## 2021-01-01 RX ADMIN — DULOXETINE HYDROCHLORIDE 60 MG: 60 CAPSULE, DELAYED RELEASE ORAL at 08:44

## 2021-01-01 RX ADMIN — ALBUTEROL SULFATE 2 PUFF: 90 AEROSOL, METERED RESPIRATORY (INHALATION) at 12:28

## 2021-01-01 RX ADMIN — ACETAMINOPHEN 650 MG: 325 TABLET ORAL at 00:38

## 2021-01-01 RX ADMIN — ATORVASTATIN CALCIUM 40 MG: 40 TABLET, FILM COATED ORAL at 08:43

## 2021-01-01 RX ADMIN — IPRATROPIUM BROMIDE 2 PUFF: 17 AEROSOL, METERED RESPIRATORY (INHALATION) at 08:25

## 2021-01-01 RX ADMIN — ASPIRIN 81 MG 81 MG: 81 TABLET ORAL at 08:43

## 2021-01-01 RX ADMIN — DEXAMETHASONE SODIUM PHOSPHATE 20 MG: 4 INJECTION, SOLUTION INTRA-ARTICULAR; INTRALESIONAL; INTRAMUSCULAR; INTRAVENOUS; SOFT TISSUE at 12:18

## 2021-01-01 RX ADMIN — REMDESIVIR 100 MG: 100 INJECTION, POWDER, LYOPHILIZED, FOR SOLUTION INTRAVENOUS at 21:25

## 2021-01-01 RX ADMIN — ALBUTEROL SULFATE 2 PUFF: 90 AEROSOL, METERED RESPIRATORY (INHALATION) at 08:25

## 2021-01-01 RX ADMIN — SODIUM CHLORIDE, PRESERVATIVE FREE 10 ML: 5 INJECTION INTRAVENOUS at 21:26

## 2021-01-01 RX ADMIN — IPRATROPIUM BROMIDE 2 PUFF: 17 AEROSOL, METERED RESPIRATORY (INHALATION) at 12:28

## 2021-01-01 RX ADMIN — ALBUTEROL SULFATE 2 PUFF: 90 AEROSOL, METERED RESPIRATORY (INHALATION) at 20:35

## 2021-01-01 RX ADMIN — ONDANSETRON 4 MG: 2 INJECTION INTRAMUSCULAR; INTRAVENOUS at 19:47

## 2021-01-01 RX ADMIN — AMLODIPINE BESYLATE 5 MG: 5 TABLET ORAL at 08:45

## 2021-01-01 RX ADMIN — METOPROLOL TARTRATE 100 MG: 50 TABLET, FILM COATED ORAL at 21:25

## 2021-01-01 RX ADMIN — ENOXAPARIN SODIUM 30 MG: 30 INJECTION SUBCUTANEOUS at 08:43

## 2021-01-01 RX ADMIN — IPRATROPIUM BROMIDE 2 PUFF: 17 AEROSOL, METERED RESPIRATORY (INHALATION) at 20:35

## 2021-01-01 ASSESSMENT — PAIN DESCRIPTION - ONSET: ONSET: UNABLE TO TELL

## 2021-01-01 ASSESSMENT — PAIN DESCRIPTION - FREQUENCY: FREQUENCY: CONTINUOUS

## 2021-01-01 ASSESSMENT — PAIN DESCRIPTION - DESCRIPTORS: DESCRIPTORS: HEADACHE

## 2021-01-01 ASSESSMENT — PAIN SCALES - GENERAL: PAINLEVEL_OUTOF10: 0

## 2021-01-01 ASSESSMENT — PAIN DESCRIPTION - PAIN TYPE: TYPE: ACUTE PAIN

## 2021-01-01 NOTE — PROGRESS NOTES
Hospitalist Progress Note      PCP: Luz Andrade MD    Date of Admission: 12/28/2020      Hospital Course: admitted with non productive cough and hypoxia, with COVID 19 PNA     Subjective:    Patient seen and examined. Still on vapotherm 40 l., + NRB  Feels very tired  + cough     Medications:  Reviewed    Infusion Medications    sodium chloride      dextrose       Scheduled Medications    enoxaparin  40 mg Subcutaneous BID    [START ON 1/6/2021] dexamethasone  10 mg Intravenous Daily    amLODIPine  5 mg Oral Daily    lisinopril  30 mg Oral Daily    metoprolol tartrate  100 mg Oral BID    dexamethasone  20 mg Intravenous Daily    sodium chloride flush  10 mL Intravenous 2 times per day    aspirin  81 mg Oral Daily    levothyroxine  75 mcg Oral Daily    atorvastatin  40 mg Oral Daily    insulin lispro  0-6 Units Subcutaneous TID WC    insulin lispro  0-3 Units Subcutaneous Nightly    albuterol sulfate HFA  2 puff Inhalation 4x daily    And    ipratropium  2 puff Inhalation 4x daily    Vitamin D  2,000 Units Oral Daily    anastrozole  1 mg Oral Daily    DULoxetine  60 mg Oral Daily    remdesivir IVPB  100 mg Intravenous Q24H     PRN Meds: sodium chloride, sodium chloride flush, acetaminophen **OR** acetaminophen, glucose, dextrose, glucagon (rDNA), dextrose, ondansetron, guaiFENesin-dextromethorphan, sodium chloride      Intake/Output Summary (Last 24 hours) at 1/1/2021 1220  Last data filed at 12/31/2020 2051  Gross per 24 hour   Intake 10 ml   Output 600 ml   Net -590 ml       Physical Exam Performed:    /80   Pulse 72   Temp 98.8 °F (37.1 °C) (Axillary)   Resp 22   Ht 5' 6\" (1.676 m)   Wt 219 lb 5.7 oz (99.5 kg)   SpO2 93%   BMI 35.41 kg/m²     General appearance: No apparent distress, alert and cooperative. HEENT: Conjunctivae/corneas clear, neck supple w/ full ROM  Respiratory:  Normal respiratory effort.  Faint bilateral rales  Cardiovascular: Regular rate and rhythm, normal S1/S2, no murmurs  Abdomen: Soft, non-tender, non-distended with normal bowel sounds. Musculoskeletal: No edema bilaterally  Neurologic:  No new focal deficits  Psychiatric: Alert and oriented, normal insight  Capillary Refill: Brisk,< 3 seconds   Peripheral Pulses: +2 palpable, equal bilaterally       Labs:   Recent Labs     12/30/20  0544 12/31/20  1225   WBC 8.4 8.5   HGB 12.0 12.3   HCT 35.3* 37.3    323     Recent Labs     12/30/20  0543 12/31/20  1225 01/01/21  0553    143 145   K 3.2* 3.8 4.1    105 105   CO2 26 29 30   BUN 11 19 22*   CREATININE 0.7 0.6 0.7   CALCIUM 8.5 8.4 8.4     Recent Labs     12/30/20  0543 12/31/20  1225 01/01/21  0553   AST 49* 51* 43*   ALT 25 30 30   BILITOT <0.2 <0.2 0.3   ALKPHOS 60 65 62     Recent Labs     12/30/20  0544 12/31/20  1225 01/01/21  0554   INR 1.00 1.06 1.08     No results for input(s): Tiara Felisa in the last 72 hours.     Urinalysis:    No results found for: [address], [address], [address] [facility] 298, [address] [place] [address] 27, [facility] 994    Radiology:  XR CHEST PORTABLE   Final Result   Bilateral ill-defined airspace opacities could represent atypical pneumonia,   multifocal bacterial pneumonia or subsegmental atelectasis                 Assessment/Plan:    Active Hospital Problems    Diagnosis    Acute respiratory failure with hypoxia ([facility] [address] [J96.01]    2019 novel coronavirus-infected pneumonia (NCIP) [U07.1, J12.82]    Elevated LDH [R74.02]    Elevated AST (SGOT) [R74.01]    Elevated ferritin [R79.89]    Elevated d-dimer [R79.89]    History of depression [Z86.59]    Essential hypertension [I10]    Class 2 obesity due to excess calories with body mass index (BMI) of 35.0 to 35.9 in adult [E66.09, Z68.35]    Suspected COVID-19 virus infection [Z20.822]    Hypothyroidism [E03.9]    HLD (hyperlipidemia) [E78.5]    PCOS (polycystic ovarian syndrome) [E28.2]    Pneumonia due to organism [J18.9]     COVID 19 JUSTIN  - c/w decadron Increased to 20 mg x 5 days then 10 mg x 5 days  - on remdesivir day 4/5  - s/p convalescent plasma 12/30/2020  - monitor inflammatory markers    Acute respiratory failure with hypoxia -   - worsening, now on vapotherm + NRB  - consulted pulmonology     HTN - elevated  - c/w bb, acei  - resume home norvasc    Hx breast cancer  - c/w arimidex    HLD  - c/w statin    Hypothroidism  - c/w levothyroxine     DMT2  - hold metformin  - ssi, monitor on steroids  - a1c - 6.0    Depression  - c/w home meds    DVT Prophylaxis: lovenox bid  Diet: DIET LOW SODIUM 2 GM; Carb Control: 4 carb choices (60 gms)/meal  Code Status: Full Code      Dispo - continue care. Prognosis guarded. Discussed with sister-in-law Yann Benitez over the phone extensively 1/1.     Benson Mooney MD

## 2021-01-01 NOTE — PROGRESS NOTES
Physician Progress Note      Ezekiel Sal  CSN #:                  [id number]  :                       1955  ADMIT DATE:       2020 9:00 PM  [address] [place] DATE:  RESPONDING  PROVIDER #:        Ilan Price MD          QUERY TEXT:    Dear Dr Elijah Fam,    Pt admitted with pneumonia. Pt noted to have low O2 sats. If possible, please   document in the progress notes and discharge summary if you are evaluating   and/or treating any of the following: The medical record reflects the following:  Risk Factors: Current admission for pneumonia and possible COVID-19. Clinical Indicators: ED VS- vs-99.5, 105, 31, 109/74   sat 81% RA, 90% 3L, 91%   7L. ..cxr- Bilateral ill-defined airspace opacities could represent atypical   pneumonia, multifocal bacterial pneumonia or subsegmental atelectasis . Zoran Llanos Per   ED nursing pn on  at 12:18am- Patient walk to bathroom and back to bed.   02 sat dropped low 80's  Treatment: Zithro IV, Rocephin IV, Decadron IV, follow labs, if resp status   worsens consider CT Chest    Thank Elsa Rivero RN BSN CDS CRCR  [email or web address]  Options provided:  -- Acute respiratory failure with hypoxia  -- Acute respiratory failure with hypercapnia  -- Chronic respiratory failure with hypoxia  -- Chronic respiratory failure with hypercapnia  -- Acute on chronic respiratory failure with hypoxia  -- Acute on chronic respiratory failure with hypercapnia  -- Other - I will add my own diagnosis  -- Disagree - Not applicable / Not valid  -- Disagree - Clinically unable to determine / Unknown  -- Refer to Clinical Documentation Reviewer    PROVIDER RESPONSE TEXT:    This patient is in acute respiratory failure with hypoxia.     Query created by: Esther Kennedy on 2020 9:48 AM      Electronically signed by:  Ilan Price MD 2020 7:56 PM

## 2021-01-01 NOTE — PROGRESS NOTES
Educated and encouraged pt to PRONE, pt was receptive with assurance this RT would stay with pt until comfortable, pt positioned in PRONE w/an increase in SPO2 to 95% from 90% HR remained stable at 96 bpm RR 20. Unfortunately after 20 minutes pt stated she wanted to be turned back. Pt repositioned Supine DUY Ramírez notified.

## 2021-01-02 LAB
A/G RATIO: 1.1 (ref 1.1–2.2)
ALBUMIN SERPL-MCNC: 2.8 G/DL (ref 3.4–5)
ALP BLD-CCNC: 68 U/L (ref 40–129)
ALT SERPL-CCNC: 28 U/L (ref 10–40)
ANION GAP SERPL CALCULATED.3IONS-SCNC: 10 MMOL/L (ref 3–16)
APTT: 27.9 SEC (ref 24.2–36.2)
AST SERPL-CCNC: 32 U/L (ref 15–37)
BASOPHILS ABSOLUTE: 0 K/UL (ref 0–0.2)
BASOPHILS RELATIVE PERCENT: 0 %
BILIRUB SERPL-MCNC: <0.2 MG/DL (ref 0–1)
BLOOD CULTURE, ROUTINE: NORMAL
BUN BLDV-MCNC: 24 MG/DL (ref 7–20)
C-REACTIVE PROTEIN: 26.5 MG/L (ref 0–5.1)
CALCIUM SERPL-MCNC: 8.5 MG/DL (ref 8.3–10.6)
CHLORIDE BLD-SCNC: 105 MMOL/L (ref 99–110)
CO2: 29 MMOL/L (ref 21–32)
CREAT SERPL-MCNC: 0.7 MG/DL (ref 0.6–1.2)
CULTURE, BLOOD 2: NORMAL
D DIMER: 3940 NG/ML DDU (ref 0–229)
EOSINOPHILS ABSOLUTE: 0 K/UL (ref 0–0.6)
EOSINOPHILS RELATIVE PERCENT: 0 %
FIBRINOGEN: 499 MG/DL (ref 200–397)
GFR AFRICAN AMERICAN: >60
GFR NON-AFRICAN AMERICAN: >60
GLOBULIN: 2.6 G/DL
GLUCOSE BLD-MCNC: 120 MG/DL (ref 70–99)
GLUCOSE BLD-MCNC: 131 MG/DL (ref 70–99)
GLUCOSE BLD-MCNC: 134 MG/DL (ref 70–99)
GLUCOSE BLD-MCNC: 144 MG/DL (ref 70–99)
GLUCOSE BLD-MCNC: 196 MG/DL (ref 70–99)
HCT VFR BLD CALC: 37.8 % (ref 36–48)
HEMOGLOBIN: 12.3 G/DL (ref 12–16)
INR BLD: 1.08 (ref 0.86–1.14)
LACTATE DEHYDROGENASE: 535 U/L (ref 100–190)
LYMPHOCYTES ABSOLUTE: 0.4 K/UL (ref 1–5.1)
LYMPHOCYTES RELATIVE PERCENT: 3 %
MAGNESIUM: 1.9 MG/DL (ref 1.8–2.4)
MCH RBC QN AUTO: 30 PG (ref 26–34)
MCHC RBC AUTO-ENTMCNC: 32.6 G/DL (ref 31–36)
MCV RBC AUTO: 91.9 FL (ref 80–100)
MONOCYTES ABSOLUTE: 0.7 K/UL (ref 0–1.3)
MONOCYTES RELATIVE PERCENT: 6 %
NEUTROPHILS ABSOLUTE: 10.7 K/UL (ref 1.7–7.7)
NEUTROPHILS RELATIVE PERCENT: 91 %
OVALOCYTES: ABNORMAL
PDW BLD-RTO: 14.6 % (ref 12.4–15.4)
PERFORMED ON: ABNORMAL
PLATELET # BLD: 382 K/UL (ref 135–450)
PMV BLD AUTO: 8 FL (ref 5–10.5)
POIKILOCYTES: ABNORMAL
POTASSIUM REFLEX MAGNESIUM: 3.4 MMOL/L (ref 3.5–5.1)
PROCALCITONIN: 0.06 NG/ML (ref 0–0.15)
PROTHROMBIN TIME: 12.5 SEC (ref 10–13.2)
RBC # BLD: 4.11 M/UL (ref 4–5.2)
SLIDE REVIEW: ABNORMAL
SODIUM BLD-SCNC: 144 MMOL/L (ref 136–145)
TOTAL PROTEIN: 5.4 G/DL (ref 6.4–8.2)
VACUOLATED NEUTROPHILS: PRESENT
WBC # BLD: 11.8 K/UL (ref 4–11)

## 2021-01-02 PROCEDURE — 2580000003 HC RX 258: Performed by: STUDENT IN AN ORGANIZED HEALTH CARE EDUCATION/TRAINING PROGRAM

## 2021-01-02 PROCEDURE — 80053 COMPREHEN METABOLIC PANEL: CPT

## 2021-01-02 PROCEDURE — 6360000002 HC RX W HCPCS: Performed by: INTERNAL MEDICINE

## 2021-01-02 PROCEDURE — 86140 C-REACTIVE PROTEIN: CPT

## 2021-01-02 PROCEDURE — 6370000000 HC RX 637 (ALT 250 FOR IP): Performed by: FAMILY MEDICINE

## 2021-01-02 PROCEDURE — 6370000000 HC RX 637 (ALT 250 FOR IP): Performed by: STUDENT IN AN ORGANIZED HEALTH CARE EDUCATION/TRAINING PROGRAM

## 2021-01-02 PROCEDURE — 85730 THROMBOPLASTIN TIME PARTIAL: CPT

## 2021-01-02 PROCEDURE — 36415 COLL VENOUS BLD VENIPUNCTURE: CPT

## 2021-01-02 PROCEDURE — 94761 N-INVAS EAR/PLS OXIMETRY MLT: CPT

## 2021-01-02 PROCEDURE — 6370000000 HC RX 637 (ALT 250 FOR IP): Performed by: INTERNAL MEDICINE

## 2021-01-02 PROCEDURE — 85384 FIBRINOGEN ACTIVITY: CPT

## 2021-01-02 PROCEDURE — 83615 LACTATE (LD) (LDH) ENZYME: CPT

## 2021-01-02 PROCEDURE — 85610 PROTHROMBIN TIME: CPT

## 2021-01-02 PROCEDURE — 94640 AIRWAY INHALATION TREATMENT: CPT

## 2021-01-02 PROCEDURE — 2580000003 HC RX 258: Performed by: FAMILY MEDICINE

## 2021-01-02 PROCEDURE — 83735 ASSAY OF MAGNESIUM: CPT

## 2021-01-02 PROCEDURE — 2500000003 HC RX 250 WO HCPCS: Performed by: FAMILY MEDICINE

## 2021-01-02 PROCEDURE — 2700000000 HC OXYGEN THERAPY PER DAY

## 2021-01-02 PROCEDURE — 2580000003 HC RX 258: Performed by: INTERNAL MEDICINE

## 2021-01-02 PROCEDURE — 85025 COMPLETE CBC W/AUTO DIFF WBC: CPT

## 2021-01-02 PROCEDURE — 2060000000 HC ICU INTERMEDIATE R&B

## 2021-01-02 PROCEDURE — 85379 FIBRIN DEGRADATION QUANT: CPT

## 2021-01-02 PROCEDURE — 6360000002 HC RX W HCPCS: Performed by: STUDENT IN AN ORGANIZED HEALTH CARE EDUCATION/TRAINING PROGRAM

## 2021-01-02 PROCEDURE — 84145 PROCALCITONIN (PCT): CPT

## 2021-01-02 RX ORDER — POTASSIUM CHLORIDE 20 MEQ/1
40 TABLET, EXTENDED RELEASE ORAL ONCE
Status: COMPLETED | OUTPATIENT
Start: 2021-01-02 | End: 2021-01-02

## 2021-01-02 RX ADMIN — REMDESIVIR 100 MG: 100 INJECTION, POWDER, LYOPHILIZED, FOR SOLUTION INTRAVENOUS at 21:00

## 2021-01-02 RX ADMIN — INSULIN LISPRO 1 UNITS: 100 INJECTION, SOLUTION INTRAVENOUS; SUBCUTANEOUS at 21:02

## 2021-01-02 RX ADMIN — ANASTROZOLE 1 MG: 1 TABLET ORAL at 08:37

## 2021-01-02 RX ADMIN — LISINOPRIL 30 MG: 10 TABLET ORAL at 08:38

## 2021-01-02 RX ADMIN — IPRATROPIUM BROMIDE 2 PUFF: 17 AEROSOL, METERED RESPIRATORY (INHALATION) at 21:04

## 2021-01-02 RX ADMIN — ENOXAPARIN SODIUM 40 MG: 40 INJECTION SUBCUTANEOUS at 08:17

## 2021-01-02 RX ADMIN — IPRATROPIUM BROMIDE 2 PUFF: 17 AEROSOL, METERED RESPIRATORY (INHALATION) at 13:00

## 2021-01-02 RX ADMIN — METOPROLOL TARTRATE 100 MG: 50 TABLET, FILM COATED ORAL at 08:38

## 2021-01-02 RX ADMIN — ALBUTEROL SULFATE 2 PUFF: 90 AEROSOL, METERED RESPIRATORY (INHALATION) at 13:00

## 2021-01-02 RX ADMIN — ALBUTEROL SULFATE 2 PUFF: 90 AEROSOL, METERED RESPIRATORY (INHALATION) at 21:04

## 2021-01-02 RX ADMIN — DULOXETINE HYDROCHLORIDE 60 MG: 60 CAPSULE, DELAYED RELEASE ORAL at 08:38

## 2021-01-02 RX ADMIN — Medication 2000 UNITS: at 08:38

## 2021-01-02 RX ADMIN — ALBUTEROL SULFATE 2 PUFF: 90 AEROSOL, METERED RESPIRATORY (INHALATION) at 16:27

## 2021-01-02 RX ADMIN — ALBUTEROL SULFATE 2 PUFF: 90 AEROSOL, METERED RESPIRATORY (INHALATION) at 08:45

## 2021-01-02 RX ADMIN — ONDANSETRON 4 MG: 2 INJECTION INTRAMUSCULAR; INTRAVENOUS at 08:23

## 2021-01-02 RX ADMIN — SODIUM CHLORIDE, PRESERVATIVE FREE 10 ML: 5 INJECTION INTRAVENOUS at 21:01

## 2021-01-02 RX ADMIN — INSULIN LISPRO 1 UNITS: 100 INJECTION, SOLUTION INTRAVENOUS; SUBCUTANEOUS at 17:25

## 2021-01-02 RX ADMIN — LEVOTHYROXINE SODIUM 75 MCG: 0.07 TABLET ORAL at 05:54

## 2021-01-02 RX ADMIN — ENOXAPARIN SODIUM 40 MG: 40 INJECTION SUBCUTANEOUS at 21:00

## 2021-01-02 RX ADMIN — ATORVASTATIN CALCIUM 40 MG: 40 TABLET, FILM COATED ORAL at 08:37

## 2021-01-02 RX ADMIN — POTASSIUM CHLORIDE 40 MEQ: 20 TABLET, EXTENDED RELEASE ORAL at 12:31

## 2021-01-02 RX ADMIN — GUAIFENESIN AND DEXTROMETHORPHAN 5 ML: 100; 10 SYRUP ORAL at 03:20

## 2021-01-02 RX ADMIN — IPRATROPIUM BROMIDE 2 PUFF: 17 AEROSOL, METERED RESPIRATORY (INHALATION) at 08:45

## 2021-01-02 RX ADMIN — ASPIRIN 81 MG 81 MG: 81 TABLET ORAL at 08:37

## 2021-01-02 RX ADMIN — IPRATROPIUM BROMIDE 2 PUFF: 17 AEROSOL, METERED RESPIRATORY (INHALATION) at 16:31

## 2021-01-02 RX ADMIN — DEXAMETHASONE SODIUM PHOSPHATE 20 MG: 4 INJECTION, SOLUTION INTRA-ARTICULAR; INTRALESIONAL; INTRAMUSCULAR; INTRAVENOUS; SOFT TISSUE at 12:09

## 2021-01-02 RX ADMIN — SODIUM CHLORIDE, PRESERVATIVE FREE 10 ML: 5 INJECTION INTRAVENOUS at 08:38

## 2021-01-02 RX ADMIN — AMLODIPINE BESYLATE 5 MG: 5 TABLET ORAL at 08:37

## 2021-01-02 RX ADMIN — METOPROLOL TARTRATE 100 MG: 50 TABLET, FILM COATED ORAL at 21:00

## 2021-01-02 ASSESSMENT — PAIN SCALES - GENERAL
PAINLEVEL_OUTOF10: 0
PAINLEVEL_OUTOF10: 0

## 2021-01-02 NOTE — PLAN OF CARE
Problem: Airway Clearance - Ineffective  Goal: Achieve or maintain patent airway  1/2/2021 1023 by True Araujo RN  Outcome: Ongoing  Note:       Problem: Breathing Pattern - Ineffective  Goal: Ability to achieve and maintain a regular respiratory rate  1/2/2021 1023 by True Araujo RN  Outcome: Ongoing  Note:       Problem: Falls - Risk of:  Goal: Will remain free from falls  Description: Will remain free from falls  Outcome: Ongoing  Note: Bed alarm kept on. Call light in reach. Side rails up x2. Pt reminded to use call light for assistance getting out of bed. Hourly rounding done to anticipate pt needs.

## 2021-01-02 NOTE — PROGRESS NOTES
Assisted pt up to chair. Encouraged deep breathing and use of IS, pt verbalized understanding. Painful for pt to prone in bed due to rods in pts back. Able to wean pt off of NRB mask and currently on 40L vapotherm 100% Fio2, sats maintaining 90-95%. Pt tolerating well. Call light in reach.   Electronically signed by Niru Chan RN on 1/2/2021 at 5:34 PM

## 2021-01-03 LAB
A/G RATIO: 1.4 (ref 1.1–2.2)
ALBUMIN SERPL-MCNC: 2.9 G/DL (ref 3.4–5)
ALP BLD-CCNC: 72 U/L (ref 40–129)
ALT SERPL-CCNC: 28 U/L (ref 10–40)
ANION GAP SERPL CALCULATED.3IONS-SCNC: 10 MMOL/L (ref 3–16)
APTT: 28.6 SEC (ref 24.2–36.2)
AST SERPL-CCNC: 27 U/L (ref 15–37)
BILIRUB SERPL-MCNC: 0.3 MG/DL (ref 0–1)
BUN BLDV-MCNC: 22 MG/DL (ref 7–20)
CALCIUM SERPL-MCNC: 8.4 MG/DL (ref 8.3–10.6)
CHLORIDE BLD-SCNC: 106 MMOL/L (ref 99–110)
CO2: 27 MMOL/L (ref 21–32)
CREAT SERPL-MCNC: 0.5 MG/DL (ref 0.6–1.2)
FIBRINOGEN: 512 MG/DL (ref 200–397)
GFR AFRICAN AMERICAN: >60
GFR NON-AFRICAN AMERICAN: >60
GLOBULIN: 2.1 G/DL
GLUCOSE BLD-MCNC: 110 MG/DL (ref 70–99)
GLUCOSE BLD-MCNC: 122 MG/DL (ref 70–99)
GLUCOSE BLD-MCNC: 152 MG/DL (ref 70–99)
GLUCOSE BLD-MCNC: 187 MG/DL (ref 70–99)
GLUCOSE BLD-MCNC: 94 MG/DL (ref 70–99)
INR BLD: 1.1 (ref 0.86–1.14)
PERFORMED ON: ABNORMAL
PERFORMED ON: NORMAL
POTASSIUM REFLEX MAGNESIUM: 3.7 MMOL/L (ref 3.5–5.1)
PROTHROMBIN TIME: 12.8 SEC (ref 10–13.2)
SODIUM BLD-SCNC: 143 MMOL/L (ref 136–145)
TOTAL PROTEIN: 5 G/DL (ref 6.4–8.2)

## 2021-01-03 PROCEDURE — 6370000000 HC RX 637 (ALT 250 FOR IP): Performed by: STUDENT IN AN ORGANIZED HEALTH CARE EDUCATION/TRAINING PROGRAM

## 2021-01-03 PROCEDURE — 6370000000 HC RX 637 (ALT 250 FOR IP): Performed by: FAMILY MEDICINE

## 2021-01-03 PROCEDURE — 6360000002 HC RX W HCPCS: Performed by: INTERNAL MEDICINE

## 2021-01-03 PROCEDURE — 85610 PROTHROMBIN TIME: CPT

## 2021-01-03 PROCEDURE — 36415 COLL VENOUS BLD VENIPUNCTURE: CPT

## 2021-01-03 PROCEDURE — 6370000000 HC RX 637 (ALT 250 FOR IP): Performed by: INTERNAL MEDICINE

## 2021-01-03 PROCEDURE — 80053 COMPREHEN METABOLIC PANEL: CPT

## 2021-01-03 PROCEDURE — 94640 AIRWAY INHALATION TREATMENT: CPT

## 2021-01-03 PROCEDURE — 85384 FIBRINOGEN ACTIVITY: CPT

## 2021-01-03 PROCEDURE — 85730 THROMBOPLASTIN TIME PARTIAL: CPT

## 2021-01-03 PROCEDURE — 6360000002 HC RX W HCPCS: Performed by: STUDENT IN AN ORGANIZED HEALTH CARE EDUCATION/TRAINING PROGRAM

## 2021-01-03 PROCEDURE — 94761 N-INVAS EAR/PLS OXIMETRY MLT: CPT

## 2021-01-03 PROCEDURE — 2580000003 HC RX 258: Performed by: INTERNAL MEDICINE

## 2021-01-03 PROCEDURE — 2580000003 HC RX 258: Performed by: STUDENT IN AN ORGANIZED HEALTH CARE EDUCATION/TRAINING PROGRAM

## 2021-01-03 PROCEDURE — 2060000000 HC ICU INTERMEDIATE R&B

## 2021-01-03 PROCEDURE — 2700000000 HC OXYGEN THERAPY PER DAY

## 2021-01-03 RX ORDER — BUDESONIDE AND FORMOTEROL FUMARATE DIHYDRATE 160; 4.5 UG/1; UG/1
2 AEROSOL RESPIRATORY (INHALATION) 2 TIMES DAILY
Status: DISCONTINUED | OUTPATIENT
Start: 2021-01-03 | End: 2021-01-23 | Stop reason: HOSPADM

## 2021-01-03 RX ORDER — FENTANYL CITRATE 50 UG/ML
25 INJECTION, SOLUTION INTRAMUSCULAR; INTRAVENOUS ONCE
Status: COMPLETED | OUTPATIENT
Start: 2021-01-03 | End: 2021-01-03

## 2021-01-03 RX ADMIN — ENOXAPARIN SODIUM 40 MG: 40 INJECTION SUBCUTANEOUS at 09:00

## 2021-01-03 RX ADMIN — INSULIN LISPRO 1 UNITS: 100 INJECTION, SOLUTION INTRAVENOUS; SUBCUTANEOUS at 17:48

## 2021-01-03 RX ADMIN — ATORVASTATIN CALCIUM 40 MG: 40 TABLET, FILM COATED ORAL at 10:12

## 2021-01-03 RX ADMIN — Medication 2 PUFF: at 12:33

## 2021-01-03 RX ADMIN — METOPROLOL TARTRATE 100 MG: 50 TABLET, FILM COATED ORAL at 20:32

## 2021-01-03 RX ADMIN — IPRATROPIUM BROMIDE 2 PUFF: 17 AEROSOL, METERED RESPIRATORY (INHALATION) at 19:48

## 2021-01-03 RX ADMIN — DEXAMETHASONE SODIUM PHOSPHATE 20 MG: 4 INJECTION, SOLUTION INTRA-ARTICULAR; INTRALESIONAL; INTRAMUSCULAR; INTRAVENOUS; SOFT TISSUE at 11:45

## 2021-01-03 RX ADMIN — LISINOPRIL 30 MG: 10 TABLET ORAL at 09:00

## 2021-01-03 RX ADMIN — ASPIRIN 81 MG 81 MG: 81 TABLET ORAL at 09:00

## 2021-01-03 RX ADMIN — LEVOTHYROXINE SODIUM 75 MCG: 0.07 TABLET ORAL at 05:35

## 2021-01-03 RX ADMIN — METOPROLOL TARTRATE 100 MG: 50 TABLET, FILM COATED ORAL at 09:00

## 2021-01-03 RX ADMIN — AMLODIPINE BESYLATE 5 MG: 5 TABLET ORAL at 09:00

## 2021-01-03 RX ADMIN — Medication 2000 UNITS: at 09:00

## 2021-01-03 RX ADMIN — GUAIFENESIN AND DEXTROMETHORPHAN 5 ML: 100; 10 SYRUP ORAL at 02:49

## 2021-01-03 RX ADMIN — IPRATROPIUM BROMIDE 2 PUFF: 17 AEROSOL, METERED RESPIRATORY (INHALATION) at 12:28

## 2021-01-03 RX ADMIN — ALBUTEROL SULFATE 2 PUFF: 90 AEROSOL, METERED RESPIRATORY (INHALATION) at 16:21

## 2021-01-03 RX ADMIN — IPRATROPIUM BROMIDE 2 PUFF: 17 AEROSOL, METERED RESPIRATORY (INHALATION) at 08:00

## 2021-01-03 RX ADMIN — SODIUM CHLORIDE, PRESERVATIVE FREE 10 ML: 5 INJECTION INTRAVENOUS at 09:00

## 2021-01-03 RX ADMIN — INSULIN LISPRO 1 UNITS: 100 INJECTION, SOLUTION INTRAVENOUS; SUBCUTANEOUS at 23:10

## 2021-01-03 RX ADMIN — IPRATROPIUM BROMIDE 2 PUFF: 17 AEROSOL, METERED RESPIRATORY (INHALATION) at 16:21

## 2021-01-03 RX ADMIN — ALBUTEROL SULFATE 2 PUFF: 90 AEROSOL, METERED RESPIRATORY (INHALATION) at 19:48

## 2021-01-03 RX ADMIN — SODIUM CHLORIDE, PRESERVATIVE FREE 10 ML: 5 INJECTION INTRAVENOUS at 23:16

## 2021-01-03 RX ADMIN — ALBUTEROL SULFATE 2 PUFF: 90 AEROSOL, METERED RESPIRATORY (INHALATION) at 12:28

## 2021-01-03 RX ADMIN — DULOXETINE HYDROCHLORIDE 60 MG: 60 CAPSULE, DELAYED RELEASE ORAL at 09:00

## 2021-01-03 RX ADMIN — ANASTROZOLE 1 MG: 1 TABLET ORAL at 09:00

## 2021-01-03 RX ADMIN — ALBUTEROL SULFATE 2 PUFF: 90 AEROSOL, METERED RESPIRATORY (INHALATION) at 08:00

## 2021-01-03 RX ADMIN — Medication 2 PUFF: at 19:49

## 2021-01-03 RX ADMIN — FENTANYL CITRATE 25 MCG: 50 INJECTION INTRAMUSCULAR; INTRAVENOUS at 05:32

## 2021-01-03 RX ADMIN — ENOXAPARIN SODIUM 40 MG: 40 INJECTION SUBCUTANEOUS at 20:32

## 2021-01-03 ASSESSMENT — PAIN SCALES - GENERAL
PAINLEVEL_OUTOF10: 0

## 2021-01-03 NOTE — PROGRESS NOTES
Pt desatting to 85-86%, placed on left side bed flat as pt does not tolerate PRONE position due to rods in back.

## 2021-01-03 NOTE — PROGRESS NOTES
Pt tearful and scared, keeps asking \"am I going to die? \". Offered support. O2 sats 88-92% on 40L vapotherm plus NRB mask. Encouraged pt to continue to lay on side and to do deep breathing, verbalized understanding. Call light in reach.    Electronically signed by Truman Gutiérrez RN on 1/3/2021 at 10:23 AM

## 2021-01-03 NOTE — PLAN OF CARE
Problem: Pain:  Goal: Pain level will decrease  Description: Pain level will decrease  Outcome: Ongoing  Note: Pain/discomfort being managed with PRN analgesics per MD orders. Pt able to express presence and absence of pain and rate pain appropriately using numerical scale. Problem: Airway Clearance - Ineffective  Goal: Achieve or maintain patent airway  Outcome: Ongoing  Note: Encouraged pt to lay on side as much as possible to help oxygenation. Problem: Breathing Pattern - Ineffective  Goal: Ability to achieve and maintain a regular respiratory rate  Outcome: Ongoing  Note:       Problem: Falls - Risk of:  Goal: Will remain free from falls  Description: Will remain free from falls  Outcome: Ongoing  Note: Bed alarm kept on. Call light in reach. Side rails up x2. Pt reminded to use call light for assistance getting out of bed. Hourly rounding done to anticipate pt needs.

## 2021-01-03 NOTE — PROGRESS NOTES
effort. Faint bilateral rales  Cardiovascular: Regular rate and rhythm, normal S1/S2, no murmurs  Abdomen: Soft, non-tender, non-distended with normal bowel sounds. Musculoskeletal: No edema bilaterally  Neurologic:  No new focal deficits  Psychiatric: Alert and oriented, normal insight  Capillary Refill: Brisk,< 3 seconds   Peripheral Pulses: +2 palpable, equal bilaterally       Labs:   Recent Labs     01/02/21  1040   WBC 11.8*   HGB 12.3   HCT 37.8        Recent Labs     01/01/21  0553 01/02/21  1040 01/03/21  1023    144 143   K 4.1 3.4* 3.7    105 106   CO2 30 29 27   BUN 22* 24* 22*   CREATININE 0.7 0.7 0.5*   CALCIUM 8.4 8.5 8.4     Recent Labs     01/01/21  0553 01/02/21  1040 01/03/21  1023   AST 43* 32 27   ALT 30 28 28   BILITOT 0.3 <0.2 0.3   ALKPHOS 62 68 72     Recent Labs     01/01/21  0554 01/02/21  1040 01/03/21  1023   INR 1.08 1.08 1.10     No results for input(s): Onetha Cristine in the last 72 hours.     Urinalysis:    No results found for: Agata Faulkner, [address] [facility] 298, [address] [place] [address], [facility] 994    Radiology:  XR CHEST PORTABLE   Final Result   Bilateral ill-defined airspace opacities could represent atypical pneumonia,   multifocal bacterial pneumonia or subsegmental atelectasis                 Assessment/Plan:    Active Hospital Problems    Diagnosis    Acute respiratory failure with hypoxia ([facility] [address] [J96.01]    2019 novel coronavirus-infected pneumonia (NCIP) [U07.1, J12.82]    Elevated LDH [R74.02]    Elevated AST (SGOT) [R74.01]    Elevated ferritin [R79.89]    Elevated d-dimer [R79.89]    History of depression [Z86.59]    Essential hypertension [I10]    Class 2 obesity due to excess calories with body mass index (BMI) of 35.0 to 35.9 in adult [E66.09, Z68.35]    Suspected COVID-19 virus infection [Z20.822]    Hypothyroidism [E03.9]    HLD (hyperlipidemia) [E78.5]    PCOS (polycystic ovarian syndrome) [E28.2]    Pneumonia due to organism [J18.9]     COVID 19 PNA  - c/w decadron  Increased to 20 mg x 5 days then 10 mg x 5 days  - on remdesivir day 5/5  - s/p convalescent plasma 12/30/2020  - monitor inflammatory markers  - added Symbicort and continue albuterol/ipatropium  - PLEASE AVOID NARCOTICS    Acute respiratory failure with hypoxia -   - worsening, now on vapotherm + NRB  - consulted pulmonology     HTN - elevated  - c/w bb, acei  - resume home norvasc    Hx breast cancer  - c/w arimidex    HLD  - c/w statin    Hypothroidism  - c/w levothyroxine     DMT2  - hold metformin  - ssi, monitor on steroids  - a1c - 6.0    Depression  - c/w home meds    DVT Prophylaxis: lovenox bid  Diet: DIET LOW SODIUM 2 GM; Carb Control: 4 carb choices (60 gms)/meal  Code Status: Full Code      Dispo - continue care. Prognosis guarded.      Daysi Rivera MD

## 2021-01-04 LAB
A/G RATIO: 0.7 (ref 1.1–2.2)
ALBUMIN SERPL-MCNC: 2.2 G/DL (ref 3.4–5)
ALP BLD-CCNC: 71 U/L (ref 40–129)
ALT SERPL-CCNC: 24 U/L (ref 10–40)
ANION GAP SERPL CALCULATED.3IONS-SCNC: 12 MMOL/L (ref 3–16)
APTT: 27.4 SEC (ref 24.2–36.2)
AST SERPL-CCNC: 21 U/L (ref 15–37)
BASOPHILS ABSOLUTE: 0 K/UL (ref 0–0.2)
BASOPHILS RELATIVE PERCENT: 0.1 %
BILIRUB SERPL-MCNC: 0.3 MG/DL (ref 0–1)
BUN BLDV-MCNC: 25 MG/DL (ref 7–20)
C-REACTIVE PROTEIN: 46.9 MG/L (ref 0–5.1)
CALCIUM SERPL-MCNC: 8.7 MG/DL (ref 8.3–10.6)
CHLORIDE BLD-SCNC: 103 MMOL/L (ref 99–110)
CO2: 27 MMOL/L (ref 21–32)
CREAT SERPL-MCNC: 0.7 MG/DL (ref 0.6–1.2)
D DIMER: 4159 NG/ML DDU (ref 0–229)
EOSINOPHILS ABSOLUTE: 0 K/UL (ref 0–0.6)
EOSINOPHILS RELATIVE PERCENT: 0 %
FIBRINOGEN: 499 MG/DL (ref 200–397)
GFR AFRICAN AMERICAN: >60
GFR NON-AFRICAN AMERICAN: >60
GLOBULIN: 3.2 G/DL
GLUCOSE BLD-MCNC: 113 MG/DL (ref 70–99)
GLUCOSE BLD-MCNC: 125 MG/DL (ref 70–99)
GLUCOSE BLD-MCNC: 183 MG/DL (ref 70–99)
GLUCOSE BLD-MCNC: 188 MG/DL (ref 70–99)
GLUCOSE BLD-MCNC: 98 MG/DL (ref 70–99)
HCT VFR BLD CALC: 38.3 % (ref 36–48)
HEMOGLOBIN: 12.7 G/DL (ref 12–16)
INR BLD: 1.1 (ref 0.86–1.14)
LACTATE DEHYDROGENASE: 537 U/L (ref 100–190)
LYMPHOCYTES ABSOLUTE: 0.8 K/UL (ref 1–5.1)
LYMPHOCYTES RELATIVE PERCENT: 5.7 %
MCH RBC QN AUTO: 30.4 PG (ref 26–34)
MCHC RBC AUTO-ENTMCNC: 33.2 G/DL (ref 31–36)
MCV RBC AUTO: 91.6 FL (ref 80–100)
MONOCYTES ABSOLUTE: 1.2 K/UL (ref 0–1.3)
MONOCYTES RELATIVE PERCENT: 8.5 %
NEUTROPHILS ABSOLUTE: 12.6 K/UL (ref 1.7–7.7)
NEUTROPHILS RELATIVE PERCENT: 85.7 %
PDW BLD-RTO: 14.4 % (ref 12.4–15.4)
PERFORMED ON: ABNORMAL
PERFORMED ON: NORMAL
PLATELET # BLD: 430 K/UL (ref 135–450)
PMV BLD AUTO: 8 FL (ref 5–10.5)
POTASSIUM REFLEX MAGNESIUM: 3.7 MMOL/L (ref 3.5–5.1)
PROCALCITONIN: 0.06 NG/ML (ref 0–0.15)
PROTHROMBIN TIME: 12.8 SEC (ref 10–13.2)
RBC # BLD: 4.18 M/UL (ref 4–5.2)
SODIUM BLD-SCNC: 142 MMOL/L (ref 136–145)
TOTAL PROTEIN: 5.4 G/DL (ref 6.4–8.2)
WBC # BLD: 14.7 K/UL (ref 4–11)

## 2021-01-04 PROCEDURE — 85610 PROTHROMBIN TIME: CPT

## 2021-01-04 PROCEDURE — 94640 AIRWAY INHALATION TREATMENT: CPT

## 2021-01-04 PROCEDURE — 2700000000 HC OXYGEN THERAPY PER DAY

## 2021-01-04 PROCEDURE — 85379 FIBRIN DEGRADATION QUANT: CPT

## 2021-01-04 PROCEDURE — 2580000003 HC RX 258: Performed by: STUDENT IN AN ORGANIZED HEALTH CARE EDUCATION/TRAINING PROGRAM

## 2021-01-04 PROCEDURE — 6360000002 HC RX W HCPCS: Performed by: INTERNAL MEDICINE

## 2021-01-04 PROCEDURE — 36415 COLL VENOUS BLD VENIPUNCTURE: CPT

## 2021-01-04 PROCEDURE — 2580000003 HC RX 258: Performed by: INTERNAL MEDICINE

## 2021-01-04 PROCEDURE — 6370000000 HC RX 637 (ALT 250 FOR IP): Performed by: STUDENT IN AN ORGANIZED HEALTH CARE EDUCATION/TRAINING PROGRAM

## 2021-01-04 PROCEDURE — 2060000000 HC ICU INTERMEDIATE R&B

## 2021-01-04 PROCEDURE — 6370000000 HC RX 637 (ALT 250 FOR IP): Performed by: FAMILY MEDICINE

## 2021-01-04 PROCEDURE — 83615 LACTATE (LD) (LDH) ENZYME: CPT

## 2021-01-04 PROCEDURE — 99233 SBSQ HOSP IP/OBS HIGH 50: CPT | Performed by: INTERNAL MEDICINE

## 2021-01-04 PROCEDURE — 80053 COMPREHEN METABOLIC PANEL: CPT

## 2021-01-04 PROCEDURE — 85730 THROMBOPLASTIN TIME PARTIAL: CPT

## 2021-01-04 PROCEDURE — 84145 PROCALCITONIN (PCT): CPT

## 2021-01-04 PROCEDURE — 86140 C-REACTIVE PROTEIN: CPT

## 2021-01-04 PROCEDURE — 85384 FIBRINOGEN ACTIVITY: CPT

## 2021-01-04 PROCEDURE — 94761 N-INVAS EAR/PLS OXIMETRY MLT: CPT

## 2021-01-04 PROCEDURE — 85025 COMPLETE CBC W/AUTO DIFF WBC: CPT

## 2021-01-04 RX ADMIN — ENOXAPARIN SODIUM 40 MG: 40 INJECTION SUBCUTANEOUS at 20:51

## 2021-01-04 RX ADMIN — SODIUM CHLORIDE, PRESERVATIVE FREE 10 ML: 5 INJECTION INTRAVENOUS at 20:53

## 2021-01-04 RX ADMIN — ALBUTEROL SULFATE 2 PUFF: 90 AEROSOL, METERED RESPIRATORY (INHALATION) at 07:40

## 2021-01-04 RX ADMIN — METOPROLOL TARTRATE 100 MG: 50 TABLET, FILM COATED ORAL at 20:53

## 2021-01-04 RX ADMIN — LISINOPRIL 30 MG: 10 TABLET ORAL at 09:51

## 2021-01-04 RX ADMIN — Medication 2 PUFF: at 07:46

## 2021-01-04 RX ADMIN — ALBUTEROL SULFATE 2 PUFF: 90 AEROSOL, METERED RESPIRATORY (INHALATION) at 19:27

## 2021-01-04 RX ADMIN — ATORVASTATIN CALCIUM 40 MG: 40 TABLET, FILM COATED ORAL at 09:50

## 2021-01-04 RX ADMIN — LEVOTHYROXINE SODIUM 75 MCG: 0.07 TABLET ORAL at 06:27

## 2021-01-04 RX ADMIN — INSULIN LISPRO 1 UNITS: 100 INJECTION, SOLUTION INTRAVENOUS; SUBCUTANEOUS at 20:52

## 2021-01-04 RX ADMIN — ANASTROZOLE 1 MG: 1 TABLET ORAL at 09:49

## 2021-01-04 RX ADMIN — ACETAMINOPHEN 650 MG: 325 TABLET ORAL at 20:50

## 2021-01-04 RX ADMIN — IPRATROPIUM BROMIDE 2 PUFF: 17 AEROSOL, METERED RESPIRATORY (INHALATION) at 07:43

## 2021-01-04 RX ADMIN — Medication 2000 UNITS: at 09:51

## 2021-01-04 RX ADMIN — DEXAMETHASONE SODIUM PHOSPHATE 20 MG: 4 INJECTION, SOLUTION INTRA-ARTICULAR; INTRALESIONAL; INTRAMUSCULAR; INTRAVENOUS; SOFT TISSUE at 13:20

## 2021-01-04 RX ADMIN — ALBUTEROL SULFATE 2 PUFF: 90 AEROSOL, METERED RESPIRATORY (INHALATION) at 15:34

## 2021-01-04 RX ADMIN — IPRATROPIUM BROMIDE 2 PUFF: 17 AEROSOL, METERED RESPIRATORY (INHALATION) at 19:28

## 2021-01-04 RX ADMIN — ASPIRIN 81 MG 81 MG: 81 TABLET ORAL at 09:49

## 2021-01-04 RX ADMIN — DULOXETINE HYDROCHLORIDE 60 MG: 60 CAPSULE, DELAYED RELEASE ORAL at 09:50

## 2021-01-04 RX ADMIN — AMLODIPINE BESYLATE 5 MG: 5 TABLET ORAL at 09:49

## 2021-01-04 RX ADMIN — METOPROLOL TARTRATE 100 MG: 50 TABLET, FILM COATED ORAL at 09:51

## 2021-01-04 RX ADMIN — ENOXAPARIN SODIUM 40 MG: 40 INJECTION SUBCUTANEOUS at 09:50

## 2021-01-04 RX ADMIN — IPRATROPIUM BROMIDE 2 PUFF: 17 AEROSOL, METERED RESPIRATORY (INHALATION) at 15:34

## 2021-01-04 RX ADMIN — INSULIN LISPRO 1 UNITS: 100 INJECTION, SOLUTION INTRAVENOUS; SUBCUTANEOUS at 17:18

## 2021-01-04 RX ADMIN — Medication 2 PUFF: at 19:28

## 2021-01-04 ASSESSMENT — PAIN DESCRIPTION - LOCATION: LOCATION: HEAD

## 2021-01-04 ASSESSMENT — PAIN DESCRIPTION - PROGRESSION: CLINICAL_PROGRESSION: GRADUALLY WORSENING

## 2021-01-04 ASSESSMENT — PAIN SCALES - GENERAL: PAINLEVEL_OUTOF10: 1

## 2021-01-04 NOTE — PROGRESS NOTES
Comprehensive Nutrition Assessment    Type and Reason for Visit:  Initial    Nutrition Recommendations/Plan:   1. Continue current diet  2. Begin Glucerna BID  3. Document meal and supplement intakes in flowsheet  4. Monitor PO intakes, weight, glucose levels, and respiratory status    Nutrition Assessment:  RD triggered for LOS assessment. Pt is COVID+ with pneumonia. Worsening respiratory failure, on 40L vapotherm in addition to nonrebreater mask. Hx breast cancer and type II diabetes. Glucose 187. On Decadron, insulin onboard. Pt is at risk for nutrition compromise AEB poor PO intake since admission. Currently on low sodium, carb control diet with intake 0-25% at all meals. Will send Glucerna BID and monitor for tolerance. Malnutrition Assessment:  Malnutrition Status: At risk for malnutrition (Comment)    Context:  Acute Illness     Findings of the 6 clinical characteristics of malnutrition:  Energy Intake:  7 - 50% or less of estimated energy requirements for 5 or more days  Weight Loss:  No significant weight loss(Since admission, no wt hx given)     Body Fat Loss:  No significant body fat loss     Muscle Mass Loss:  No significant muscle mass loss    Fluid Accumulation:  No significant fluid accumulation     Strength:  Not Performed    Estimated Daily Nutrient Needs:  Energy (kcal):  8815-4720 (15-18kcals/88kg); Weight Used for Energy Requirements:  Admission     Protein (g):   (1.2-2.0g/59kg); Weight Used for Protein Requirements:  Ideal        Fluid (ml/day):1 ml/kcal      Nutrition Related Findings:  Active BS. No edema. -3 liters.       Wounds:  None       Anthropometric Measures:  · Height: 5' 6\" (167.6 cm)  · Current Body Weight: 186 lb (84.4 kg)(May not be an accurate wt)   · Admission Body Weight: 193 lb (87.5 kg)    · Usual Body Weight: 215 lb (97.5 kg)(Since admission)     · Ideal Body Weight: 130 lbs; % Ideal Body Weight 143.1 %   · BMI: 30    · BMI Categories: Obese Class 1 (BMI 30.0-34. 9)       Nutrition Diagnosis:   · Inadequate oral intake related to impaired respiratory function, inadequate protein-energy intake as evidenced by intake 0-25%      Nutrition Interventions:   Food and/or Nutrient Delivery:  Continue Current Diet, Start Oral Nutrition Supplement  Nutrition Education/Counseling:  Education not indicated   Coordination of Nutrition Care:  Continue to monitor while inpatient    Goals:  Meal and supplement intake greater than 50%       Nutrition Monitoring and Evaluation:   Behavioral-Environmental Outcomes:  None Identified   Food/Nutrient Intake Outcomes:  Food and Nutrient Intake, Supplement Intake  Physical Signs/Symptoms Outcomes:  Biochemical Data, Fluid Status or Edema, Weight, Other (Comment)(Respiratory status)     Discharge Planning:     Too soon to determine     Electronically signed by Biju Orosco RD, LD on 1/4/21 at 8:11 AM EST    Contact: [phone]

## 2021-01-04 NOTE — PROGRESS NOTES
Hospitalist Progress Note      PCP: Nusrat Rodriguez MD    Date of Admission: 12/28/2020      Hospital Course: admitted with non productive cough and hypoxia, with COVID 19 PNA     Subjective:    Patient seen and examined. Still on vapotherm 40 l., + NRB  Desaturated overnight, cannot prone due to back rods  Saturations better today. Medications:  Reviewed    Infusion Medications    sodium chloride      dextrose       Scheduled Medications    budesonide-formoterol  2 puff Inhalation BID    enoxaparin  40 mg Subcutaneous BID    [START ON 1/6/2021] dexamethasone  10 mg Intravenous Daily    amLODIPine  5 mg Oral Daily    lisinopril  30 mg Oral Daily    metoprolol tartrate  100 mg Oral BID    dexamethasone  20 mg Intravenous Daily    sodium chloride flush  10 mL Intravenous 2 times per day    aspirin  81 mg Oral Daily    levothyroxine  75 mcg Oral Daily    atorvastatin  40 mg Oral Daily    insulin lispro  0-6 Units Subcutaneous TID WC    insulin lispro  0-3 Units Subcutaneous Nightly    albuterol sulfate HFA  2 puff Inhalation 4x daily    And    ipratropium  2 puff Inhalation 4x daily    Vitamin D  2,000 Units Oral Daily    anastrozole  1 mg Oral Daily    DULoxetine  60 mg Oral Daily     PRN Meds: sodium chloride, sodium chloride flush, acetaminophen **OR** acetaminophen, glucose, dextrose, glucagon (rDNA), dextrose, ondansetron, guaiFENesin-dextromethorphan, sodium chloride      Intake/Output Summary (Last 24 hours) at 1/4/2021 1340  Last data filed at 1/4/2021 1006  Gross per 24 hour   Intake 650 ml   Output 1225 ml   Net -575 ml       Physical Exam Performed:    /78   Pulse 82   Temp 98.8 °F (37.1 °C) (Axillary)   Resp 20   Ht 5' 6\" (1.676 m)   Wt 186 lb 8.2 oz (84.6 kg) Comment: weighed twice, accurate  SpO2 93%   BMI 30.10 kg/m²     General appearance: No apparent distress, alert and cooperative.   HEENT: Conjunctivae/corneas clear, neck supple w/ full ROM  Respiratory: Normal respiratory effort. Faint bilateral rales  Cardiovascular: Regular rate and rhythm, normal S1/S2, no murmurs  Abdomen: Soft, non-tender, non-distended with normal bowel sounds. Musculoskeletal: No edema bilaterally  Neurologic:  No new focal deficits  Psychiatric: Alert and oriented, normal insight  Capillary Refill: Brisk,< 3 seconds   Peripheral Pulses: +2 palpable, equal bilaterally       Labs:   Recent Labs     01/02/21  1040 01/04/21  1014   WBC 11.8* 14.7*   HGB 12.3 12.7   HCT 37.8 38.3    430     Recent Labs     01/02/21  1040 01/03/21  1023 01/04/21  1014    143 142   K 3.4* 3.7 3.7    106 103   CO2 29 27 27   BUN 24* 22* 25*   CREATININE 0.7 0.5* 0.7   CALCIUM 8.5 8.4 8.7     Recent Labs     01/02/21  1040 01/03/21  1023 01/04/21  1014   AST 32 27 21   ALT 28 28 24   BILITOT <0.2 0.3 0.3   ALKPHOS 68 72 71     Recent Labs     01/02/21  1040 01/03/21  1023 01/04/21  1014   INR 1.08 1.10 1.10     No results for input(s): Yeison Viral in the last 72 hours.     Urinalysis:    No results found for: Tammy Fuentes, [address] [facility] 298, [address] [place] [address], [facility] 994    Radiology:  XR CHEST PORTABLE   Final Result   Bilateral ill-defined airspace opacities could represent atypical pneumonia,   multifocal bacterial pneumonia or subsegmental atelectasis                 Assessment/Plan:    Active Hospital Problems    Diagnosis    Acute respiratory failure with hypoxia ([facility] [address] [J96.01]    2019 novel coronavirus-infected pneumonia (NCIP) [U07.1, J12.82]    Elevated LDH [R74.02]    Elevated AST (SGOT) [R74.01]    Elevated ferritin [R79.89]    Elevated d-dimer [R79.89]    History of depression [Z86.59]    Essential hypertension [I10]    Class 2 obesity due to excess calories with body mass index (BMI) of 35.0 to 35.9 in adult [E66.09, Z68.35]    Suspected COVID-19 virus infection [Z20.822]    Hypothyroidism [E03.9]    HLD (hyperlipidemia) [E78.5]    PCOS (polycystic ovarian syndrome) [E28.2]    Pneumonia due to organism [J18.9]     COVID 19 PNA  - c/w decadron  Increased to 20 mg x 5 days then 10 mg x 5 days  - on remdesivir day 5/5  - s/p convalescent plasma 12/30/2020  - monitor inflammatory markers  - added Symbicort and continue albuterol/ipatropium  - PLEASE AVOID NARCOTICS    Acute respiratory failure with hypoxia -   - worsening, now on vapotherm + NRB  - consulted pulmonology     HTN - elevated  - c/w bb, acei  - resume home norvasc    Hx breast cancer  - c/w arimidex    HLD  - c/w statin    Hypothroidism  - c/w levothyroxine     DMT2  - hold metformin  - ssi, monitor on steroids  - a1c - 6.0    Depression  - c/w home meds    DVT Prophylaxis: lovenox bid  Diet: DIET LOW SODIUM 2 GM; Carb Control: 4 carb choices (60 gms)/meal  Dietary Nutrition Supplements: Diabetic Oral Supplement  Code Status: Full Code      Dispo - continue care. Prognosis guarded.      Mortimer Popper, MD

## 2021-01-04 NOTE — CARE COORDINATION
Case Management follow up. Per chart review patient still w/ significant oxygen demands. On 40L vapotherm FiO2 100% + NRB. Will monitor patient's progress. Patient plans home at discharge.  Electronically signed by Carmen López RN Case Management [phone] on 1/4/2021 at 10:58 AM

## 2021-01-04 NOTE — PROGRESS NOTES
Pulmonary Progress Note    CC: Acute hypoxic respiratory failure  COVID-19 pneumonia  Hyperglycemia  Leukocytosis    24 hr events:  She is on maximum settings of Vapotherm as well as a nonrebreather mask. ROS:  No SOB  No cough  No vomiting       PHYSICAL EXAM:  Blood pressure 118/78, pulse 78, temperature 98.8 °F (37.1 °C), temperature source Axillary, resp. rate 20, height 5' 6\" (1.676 m), weight 186 lb 8.2 oz (84.6 kg), SpO2 97 %. On Vapotherm 40 L, 100% and nonrebreather mask    Intake/Output Summary (Last 24 hours) at 1/4/2021 1805  Last data filed at 1/4/2021 1006  Gross per 24 hour   Intake 480 ml   Output 825 ml   Net -345 ml       Gen: Well developed; well nourished  Eyes: No scleral icterus. No conjunctival injection. ENT:  External appearance of ears and nose normal.  Neck: Trachea midline. No obvious mass. No visible thyroid enlargement    Respiratory: Crackles over left chest, no accessory muscle use  Cardiovascular: Regular rate and rhythm, no appreciable murmurs. No edema. Skin: Warm and dry. No rashes or ulcers on visible areas. Normal texture and turgor  Musculoskeletal: No cyanosis, clubbing or joint deformity.     Psychiatric: Normal mood and affect; exhibits normal insight and judgement     Medications:  Current Facility-Administered Medications: budesonide-formoterol (SYMBICORT) 160-4.5 MCG/ACT inhaler 2 puff, 2 puff, Inhalation, BID  enoxaparin (LOVENOX) injection 40 mg, 40 mg, Subcutaneous, BID  [START ON 1/6/2021] dexamethasone (PF) (DECADRON) injection 10 mg, 10 mg, Intravenous, Daily  amLODIPine (NORVASC) tablet 5 mg, 5 mg, Oral, Daily  lisinopril (PRINIVIL;ZESTRIL) tablet 30 mg, 30 mg, Oral, Daily  metoprolol tartrate (LOPRESSOR) tablet 100 mg, 100 mg, Oral, BID  0.9 % sodium chloride infusion, , Intravenous, PRN  sodium chloride flush 0.9 % injection 10 mL, 10 mL, Intravenous, 2 times per day  sodium chloride flush 0.9 % injection 10 mL, 10 mL, Intravenous, PRN  acetaminophen (TYLENOL) tablet 650 mg, 650 mg, Oral, Q6H PRN **OR** acetaminophen (TYLENOL) suppository 650 mg, 650 mg, Rectal, Q6H PRN  aspirin chewable tablet 81 mg, 81 mg, Oral, Daily  levothyroxine (SYNTHROID) tablet 75 mcg, 75 mcg, Oral, Daily  atorvastatin (LIPITOR) tablet 40 mg, 40 mg, Oral, Daily  insulin lispro (HUMALOG) injection vial 0-6 Units, 0-6 Units, Subcutaneous, TID WC  insulin lispro (HUMALOG) injection vial 0-3 Units, 0-3 Units, Subcutaneous, Nightly  glucose (GLUTOSE) 40 % oral gel 15 g, 15 g, Oral, PRN  dextrose 50 % IV solution, 12.5 g, Intravenous, PRN  glucagon (rDNA) injection 1 mg, 1 mg, Intramuscular, PRN  dextrose 5 % solution, 100 mL/hr, Intravenous, PRN  ondansetron (ZOFRAN) injection 4 mg, 4 mg, Intravenous, Q6H PRN  albuterol sulfate  (90 Base) MCG/ACT inhaler 2 puff, 2 puff, Inhalation, 4x daily **AND** ipratropium (ATROVENT HFA) 17 MCG/ACT inhaler 2 puff, 2 puff, Inhalation, 4x daily **AND** MDI Treatment, , , 4x daily  guaiFENesin-dextromethorphan (ROBITUSSIN DM) 100-10 MG/5ML syrup 5 mL, 5 mL, Oral, Q4H PRN  Vitamin D (CHOLECALCIFEROL) tablet 2,000 Units, 2,000 Units, Oral, Daily  anastrozole (ARIMIDEX) tablet 1 mg, 1 mg, Oral, Daily  DULoxetine (CYMBALTA) extended release capsule 60 mg, 60 mg, Oral, Daily  sodium chloride (OCEAN, BABY AYR) 0.65 % nasal spray 1 spray, 1 spray, Each Nostril, Q2H PRN    Data reviewed:  Labs:  CBC:   Recent Labs     01/02/21  1040 01/04/21  1014   WBC 11.8* 14.7*   HGB 12.3 12.7   HCT 37.8 38.3   MCV 91.9 91.6    430     BMP:   Recent Labs     01/02/21  1040 01/03/21  1023 01/04/21  1014    143 142   K 3.4* 3.7 3.7    106 103   CO2 29 27 27   BUN 24* 22* 25*   CREATININE 0.7 0.5* 0.7     LIVER PROFILE:   Recent Labs     01/02/21  1040 01/03/21  1023 01/04/21  1014   AST 32 27 21   ALT 28 28 24   BILITOT <0.2 0.3 0.3   ALKPHOS 68 72 71     PT/INR:   Recent Labs     01/02/21  1040 01/03/21  1023 01/04/21  1014   PROTIME 12.5 12.8 12.8

## 2021-01-05 LAB
A/G RATIO: 0.7 (ref 1.1–2.2)
ALBUMIN SERPL-MCNC: 2.4 G/DL (ref 3.4–5)
ALP BLD-CCNC: 71 U/L (ref 40–129)
ALT SERPL-CCNC: 22 U/L (ref 10–40)
ANION GAP SERPL CALCULATED.3IONS-SCNC: 14 MMOL/L (ref 3–16)
APTT: 27.4 SEC (ref 24.2–36.2)
AST SERPL-CCNC: 20 U/L (ref 15–37)
BASOPHILS ABSOLUTE: 0 K/UL (ref 0–0.2)
BASOPHILS RELATIVE PERCENT: 0.1 %
BILIRUB SERPL-MCNC: 0.4 MG/DL (ref 0–1)
BUN BLDV-MCNC: 27 MG/DL (ref 7–20)
CALCIUM SERPL-MCNC: 8.5 MG/DL (ref 8.3–10.6)
CHLORIDE BLD-SCNC: 101 MMOL/L (ref 99–110)
CO2: 23 MMOL/L (ref 21–32)
CREAT SERPL-MCNC: 0.6 MG/DL (ref 0.6–1.2)
EOSINOPHILS ABSOLUTE: 0 K/UL (ref 0–0.6)
EOSINOPHILS RELATIVE PERCENT: 0 %
FIBRINOGEN: 489 MG/DL (ref 200–397)
GFR AFRICAN AMERICAN: >60
GFR NON-AFRICAN AMERICAN: >60
GLOBULIN: 3.3 G/DL
GLUCOSE BLD-MCNC: 121 MG/DL (ref 70–99)
GLUCOSE BLD-MCNC: 125 MG/DL (ref 70–99)
GLUCOSE BLD-MCNC: 128 MG/DL (ref 70–99)
GLUCOSE BLD-MCNC: 140 MG/DL (ref 70–99)
GLUCOSE BLD-MCNC: 175 MG/DL (ref 70–99)
HCT VFR BLD CALC: 39 % (ref 36–48)
HEMOGLOBIN: 12.8 G/DL (ref 12–16)
INR BLD: 1.08 (ref 0.86–1.14)
LYMPHOCYTES ABSOLUTE: 0.7 K/UL (ref 1–5.1)
LYMPHOCYTES RELATIVE PERCENT: 4.9 %
MCH RBC QN AUTO: 30.4 PG (ref 26–34)
MCHC RBC AUTO-ENTMCNC: 32.8 G/DL (ref 31–36)
MCV RBC AUTO: 92.7 FL (ref 80–100)
MONOCYTES ABSOLUTE: 0.9 K/UL (ref 0–1.3)
MONOCYTES RELATIVE PERCENT: 6.4 %
NEUTROPHILS ABSOLUTE: 12.6 K/UL (ref 1.7–7.7)
NEUTROPHILS RELATIVE PERCENT: 88.6 %
PDW BLD-RTO: 14.5 % (ref 12.4–15.4)
PERFORMED ON: ABNORMAL
PLATELET # BLD: 445 K/UL (ref 135–450)
PMV BLD AUTO: 8.4 FL (ref 5–10.5)
POTASSIUM REFLEX MAGNESIUM: 3.8 MMOL/L (ref 3.5–5.1)
PROTHROMBIN TIME: 12.5 SEC (ref 10–13.2)
RBC # BLD: 4.21 M/UL (ref 4–5.2)
SODIUM BLD-SCNC: 138 MMOL/L (ref 136–145)
TOTAL PROTEIN: 5.7 G/DL (ref 6.4–8.2)
WBC # BLD: 14.2 K/UL (ref 4–11)

## 2021-01-05 PROCEDURE — 6370000000 HC RX 637 (ALT 250 FOR IP): Performed by: STUDENT IN AN ORGANIZED HEALTH CARE EDUCATION/TRAINING PROGRAM

## 2021-01-05 PROCEDURE — 6360000002 HC RX W HCPCS: Performed by: INTERNAL MEDICINE

## 2021-01-05 PROCEDURE — 2060000000 HC ICU INTERMEDIATE R&B

## 2021-01-05 PROCEDURE — 85025 COMPLETE CBC W/AUTO DIFF WBC: CPT

## 2021-01-05 PROCEDURE — 2700000000 HC OXYGEN THERAPY PER DAY

## 2021-01-05 PROCEDURE — 6370000000 HC RX 637 (ALT 250 FOR IP): Performed by: FAMILY MEDICINE

## 2021-01-05 PROCEDURE — 94640 AIRWAY INHALATION TREATMENT: CPT

## 2021-01-05 PROCEDURE — 2580000003 HC RX 258: Performed by: STUDENT IN AN ORGANIZED HEALTH CARE EDUCATION/TRAINING PROGRAM

## 2021-01-05 PROCEDURE — 85610 PROTHROMBIN TIME: CPT

## 2021-01-05 PROCEDURE — 99233 SBSQ HOSP IP/OBS HIGH 50: CPT | Performed by: INTERNAL MEDICINE

## 2021-01-05 PROCEDURE — 36415 COLL VENOUS BLD VENIPUNCTURE: CPT

## 2021-01-05 PROCEDURE — 94761 N-INVAS EAR/PLS OXIMETRY MLT: CPT

## 2021-01-05 PROCEDURE — 85730 THROMBOPLASTIN TIME PARTIAL: CPT

## 2021-01-05 PROCEDURE — 80053 COMPREHEN METABOLIC PANEL: CPT

## 2021-01-05 PROCEDURE — 85384 FIBRINOGEN ACTIVITY: CPT

## 2021-01-05 RX ADMIN — Medication 2 PUFF: at 08:15

## 2021-01-05 RX ADMIN — ATORVASTATIN CALCIUM 40 MG: 40 TABLET, FILM COATED ORAL at 08:53

## 2021-01-05 RX ADMIN — SODIUM CHLORIDE, PRESERVATIVE FREE 10 ML: 5 INJECTION INTRAVENOUS at 19:50

## 2021-01-05 RX ADMIN — Medication 2 PUFF: at 20:10

## 2021-01-05 RX ADMIN — SODIUM CHLORIDE, PRESERVATIVE FREE 10 ML: 5 INJECTION INTRAVENOUS at 08:54

## 2021-01-05 RX ADMIN — ALBUTEROL SULFATE 2 PUFF: 90 AEROSOL, METERED RESPIRATORY (INHALATION) at 16:05

## 2021-01-05 RX ADMIN — ASPIRIN 81 MG 81 MG: 81 TABLET ORAL at 08:53

## 2021-01-05 RX ADMIN — ENOXAPARIN SODIUM 40 MG: 40 INJECTION SUBCUTANEOUS at 19:50

## 2021-01-05 RX ADMIN — ALBUTEROL SULFATE 2 PUFF: 90 AEROSOL, METERED RESPIRATORY (INHALATION) at 20:00

## 2021-01-05 RX ADMIN — IPRATROPIUM BROMIDE 2 PUFF: 17 AEROSOL, METERED RESPIRATORY (INHALATION) at 20:05

## 2021-01-05 RX ADMIN — AMLODIPINE BESYLATE 5 MG: 5 TABLET ORAL at 08:52

## 2021-01-05 RX ADMIN — LEVOTHYROXINE SODIUM 75 MCG: 0.07 TABLET ORAL at 06:38

## 2021-01-05 RX ADMIN — INSULIN LISPRO 1 UNITS: 100 INJECTION, SOLUTION INTRAVENOUS; SUBCUTANEOUS at 19:59

## 2021-01-05 RX ADMIN — ALBUTEROL SULFATE 2 PUFF: 90 AEROSOL, METERED RESPIRATORY (INHALATION) at 08:05

## 2021-01-05 RX ADMIN — LISINOPRIL 30 MG: 10 TABLET ORAL at 08:54

## 2021-01-05 RX ADMIN — ALBUTEROL SULFATE 2 PUFF: 90 AEROSOL, METERED RESPIRATORY (INHALATION) at 11:35

## 2021-01-05 RX ADMIN — Medication 2000 UNITS: at 08:54

## 2021-01-05 RX ADMIN — ANASTROZOLE 1 MG: 1 TABLET ORAL at 08:53

## 2021-01-05 RX ADMIN — METOPROLOL TARTRATE 100 MG: 50 TABLET, FILM COATED ORAL at 08:54

## 2021-01-05 RX ADMIN — ENOXAPARIN SODIUM 40 MG: 40 INJECTION SUBCUTANEOUS at 08:53

## 2021-01-05 RX ADMIN — IPRATROPIUM BROMIDE 2 PUFF: 17 AEROSOL, METERED RESPIRATORY (INHALATION) at 11:40

## 2021-01-05 RX ADMIN — IPRATROPIUM BROMIDE 2 PUFF: 17 AEROSOL, METERED RESPIRATORY (INHALATION) at 08:10

## 2021-01-05 RX ADMIN — IPRATROPIUM BROMIDE 2 PUFF: 17 AEROSOL, METERED RESPIRATORY (INHALATION) at 16:10

## 2021-01-05 RX ADMIN — ACETAMINOPHEN 650 MG: 325 TABLET ORAL at 19:50

## 2021-01-05 RX ADMIN — DULOXETINE HYDROCHLORIDE 60 MG: 60 CAPSULE, DELAYED RELEASE ORAL at 08:53

## 2021-01-05 RX ADMIN — METOPROLOL TARTRATE 100 MG: 50 TABLET, FILM COATED ORAL at 19:50

## 2021-01-05 ASSESSMENT — PAIN DESCRIPTION - PROGRESSION: CLINICAL_PROGRESSION: NOT CHANGED

## 2021-01-05 ASSESSMENT — PAIN DESCRIPTION - ONSET: ONSET: ON-GOING

## 2021-01-05 ASSESSMENT — PAIN DESCRIPTION - LOCATION: LOCATION: HEAD

## 2021-01-05 ASSESSMENT — PAIN SCALES - GENERAL
PAINLEVEL_OUTOF10: 0
PAINLEVEL_OUTOF10: 3
PAINLEVEL_OUTOF10: 0

## 2021-01-05 ASSESSMENT — PAIN DESCRIPTION - PAIN TYPE: TYPE: ACUTE PAIN

## 2021-01-05 ASSESSMENT — PAIN DESCRIPTION - FREQUENCY: FREQUENCY: INTERMITTENT

## 2021-01-05 NOTE — PROGRESS NOTES
Hospitalist Progress Note      PCP: Colleen Aly MD    Date of Admission: 12/28/2020      Hospital Course: admitted with non productive cough and hypoxia, with COVID 19 PNA     Subjective:    Patient seen and examined. Still on vapotherm 40 l., + NRB  Saturations better today. Patient feels better today. Medications:  Reviewed    Infusion Medications    sodium chloride      dextrose       Scheduled Medications    budesonide-formoterol  2 puff Inhalation BID    enoxaparin  40 mg Subcutaneous BID    [START ON 1/6/2021] dexamethasone  10 mg Intravenous Daily    amLODIPine  5 mg Oral Daily    lisinopril  30 mg Oral Daily    metoprolol tartrate  100 mg Oral BID    sodium chloride flush  10 mL Intravenous 2 times per day    aspirin  81 mg Oral Daily    levothyroxine  75 mcg Oral Daily    atorvastatin  40 mg Oral Daily    insulin lispro  0-6 Units Subcutaneous TID WC    insulin lispro  0-3 Units Subcutaneous Nightly    albuterol sulfate HFA  2 puff Inhalation 4x daily    And    ipratropium  2 puff Inhalation 4x daily    Vitamin D  2,000 Units Oral Daily    anastrozole  1 mg Oral Daily    DULoxetine  60 mg Oral Daily     PRN Meds: sodium chloride, sodium chloride flush, acetaminophen **OR** acetaminophen, glucose, dextrose, glucagon (rDNA), dextrose, ondansetron, guaiFENesin-dextromethorphan, sodium chloride      Intake/Output Summary (Last 24 hours) at 1/5/2021 1354  Last data filed at 1/5/2021 0830  Gross per 24 hour   Intake 250 ml   Output --   Net 250 ml       Physical Exam Performed:    /71   Pulse 71   Temp 97.2 °F (36.2 °C) (Oral)   Resp 30   Ht 5' 6\" (1.676 m)   Wt 189 lb 13.1 oz (86.1 kg)   SpO2 93%   BMI 30.64 kg/m²     General appearance: No apparent distress, alert and cooperative. HEENT: Conjunctivae/corneas clear, neck supple w/ full ROM  Respiratory:  Normal respiratory effort.  Faint bilateral rales  Cardiovascular: Regular rate and rhythm, normal S1/S2, no murmurs  Abdomen: Soft, non-tender, non-distended with normal bowel sounds. Musculoskeletal: No edema bilaterally  Neurologic:  No new focal deficits  Psychiatric: Alert and oriented, normal insight  Capillary Refill: Brisk,< 3 seconds   Peripheral Pulses: +2 palpable, equal bilaterally       Labs:   Recent Labs     01/04/21  1014 01/05/21  1006   WBC 14.7* 14.2*   HGB 12.7 12.8   HCT 38.3 39.0    445     Recent Labs     01/03/21  1023 01/04/21  1014 01/05/21  1006    142 138   K 3.7 3.7 3.8    103 101   CO2 27 27 23   BUN 22* 25* 27*   CREATININE 0.5* 0.7 0.6   CALCIUM 8.4 8.7 8.5     Recent Labs     01/03/21  1023 01/04/21  1014 01/05/21  1006   AST 27 21 20   ALT 28 24 22   BILITOT 0.3 0.3 0.4   ALKPHOS 72 71 71     Recent Labs     01/03/21  1023 01/04/21  1014 01/05/21  1006   INR 1.10 1.10 1.08     No results for input(s): [place] in the last 72 hours.     Urinalysis:    No results found for: Palma Sarah, [address] [facility] 298, [address] [place] [address], [facility] 994    Radiology:  XR CHEST PORTABLE   Final Result   Bilateral ill-defined airspace opacities could represent atypical pneumonia,   multifocal bacterial pneumonia or subsegmental atelectasis                 Assessment/Plan:    Active Hospital Problems    Diagnosis    Pneumonia due to COVID-19 virus [U07.1, J12.82]    Hyperglycemia [R73.9]    Leukocytosis [D72.829]    Acute respiratory failure with hypoxia ([facility] [address] [J96.01]    2019 novel coronavirus-infected pneumonia (NCIP) [U07.1, J12.82]    Elevated LDH [R74.02]    Elevated AST (SGOT) [R74.01]    Elevated ferritin [R79.89]    Elevated d-dimer [R79.89]    History of depression [Z86.59]    Essential hypertension [I10]    Class 2 obesity due to excess calories with body mass index (BMI) of 35.0 to 35.9 in adult [E66.09, Z68.35]    Suspected COVID-19 virus infection [Z20.822]    Hypothyroidism [E03.9]    HLD (hyperlipidemia) [E78.5]    PCOS (polycystic ovarian syndrome) [E28.2]  Pneumonia due to organism [J18.9]     COVID 19 PNA  - c/w decadron  Increased to 20 mg x 5 days then 10 mg x 5 days  - completed remdesivir day 5/5  - s/p convalescent plasma 12/30/2020  - monitor inflammatory markers  - added Symbicort and continue albuterol/ipatropium  - PLEASE AVOID NARCOTICS    Acute respiratory failure with hypoxia -   - worsening, now on vapotherm + NRB  - consulted pulmonology     HTN - elevated  - c/w bb, acei  - resume home norvasc    Hx breast cancer  - c/w arimidex    HLD  - c/w statin    Hypothroidism  - c/w levothyroxine     DMT2  - hold metformin  - ssi, monitor on steroids  - a1c - 6.0    Depression  - c/w home meds    DVT Prophylaxis: lovenox bid  Diet: DIET LOW SODIUM 2 GM; Carb Control: 4 carb choices (60 gms)/meal  Dietary Nutrition Supplements: Diabetic Oral Supplement  Code Status: Full Code      Dispo - continue care. Prognosis guarded.      Yoko Lassiter MD

## 2021-01-05 NOTE — PLAN OF CARE
Problem: Pain:  Goal: Pain level will decrease  Description: Pain level will decrease  Outcome: Ongoing  Goal: Control of acute pain  Description: Control of acute pain  Outcome: Ongoing  Goal: Control of chronic pain  Description: Control of chronic pain  Outcome: Ongoing     Problem: Airway Clearance - Ineffective  Goal: Achieve or maintain patent airway  Outcome: Ongoing     Problem: Gas Exchange - Impaired  Goal: Absence of hypoxia  Outcome: Ongoing  Goal: Promote optimal lung function  Outcome: Ongoing     Problem: Breathing Pattern - Ineffective  Goal: Ability to achieve and maintain a regular respiratory rate  Outcome: Ongoing     Problem:  Body Temperature -  Risk of, Imbalanced  Goal: Ability to maintain a body temperature within defined limits  Outcome: Ongoing  Goal: Will regain or maintain usual level of consciousness  Outcome: Ongoing  Goal: Complications related to the disease process, condition or treatment will be avoided or minimized  Outcome: Ongoing     Problem: Isolation Precautions - Risk of Spread of Infection  Goal: Prevent transmission of infection  Outcome: Ongoing     Problem: Nutrition Deficits  Goal: Optimize nutrtional status  Outcome: Ongoing     Problem: Risk for Fluid Volume Deficit  Goal: Maintain normal heart rhythm  Outcome: Ongoing  Goal: Maintain absence of muscle cramping  Outcome: Ongoing  Goal: Maintain normal serum potassium, sodium, calcium, phosphorus, and pH  Outcome: Ongoing     Problem: Loneliness or Risk for Loneliness  Goal: Demonstrate positive use of time alone when socialization is not possible  Outcome: Ongoing     Problem: Fatigue  Goal: Verbalize increase energy and improved vitality  Outcome: Ongoing     Problem: Patient Education: Go to Patient Education Activity  Goal: Patient/Family Education  Outcome: Ongoing     Problem: Falls - Risk of:  Goal: Will remain free from falls  Description: Will remain free from falls  Outcome: Ongoing  Goal: Absence of physical injury  Description: Absence of physical injury  Outcome: Ongoing     Problem: Nutrition  Goal: Optimal nutrition therapy  Outcome: Ongoing

## 2021-01-05 NOTE — PROGRESS NOTES
Pulmonary Progress Note    CC: Acute hypoxic respiratory failure  COVID-19 pneumonia  Hyperglycemia  Leukocytosis    24 hr events:  She remains on maximum settings of vapotherm and a non re-breather mask. She has an intermittent cough. ROS:  No SOB  +cough  No vomiting       PHYSICAL EXAM:  Blood pressure 101/71, pulse 71, temperature 97.2 °F (36.2 °C), temperature source Oral, resp. rate 30, height 5' 6\" (1.676 m), weight 189 lb 13.1 oz (86.1 kg), SpO2 93 %. On Vapotherm 40 L, 100% and nonrebreather mask    Intake/Output Summary (Last 24 hours) at 1/5/2021 1205  Last data filed at 1/4/2021 2044  Gross per 24 hour   Intake 240 ml   Output --   Net 240 ml       Gen: Well developed; well nourished  Eyes: No scleral icterus. No conjunctival injection. ENT:  External appearance of ears and nose normal.  Neck: Trachea midline. No obvious mass. No visible thyroid enlargement    Respiratory: Crackles bilaterally, no accessory muscle use  Cardiovascular: Regular rate and rhythm, no appreciable murmurs. No edema. Skin: Warm and dry. No rashes or ulcers on visible areas. Normal texture and turgor  Musculoskeletal: No cyanosis, clubbing or joint deformity.     Psychiatric: Normal mood and affect; exhibits normal insight and judgement     Medications:  Current Facility-Administered Medications: budesonide-formoterol (SYMBICORT) 160-4.5 MCG/ACT inhaler 2 puff, 2 puff, Inhalation, BID  enoxaparin (LOVENOX) injection 40 mg, 40 mg, Subcutaneous, BID  [START ON 1/6/2021] dexamethasone (PF) (DECADRON) injection 10 mg, 10 mg, Intravenous, Daily  amLODIPine (NORVASC) tablet 5 mg, 5 mg, Oral, Daily  lisinopril (PRINIVIL;ZESTRIL) tablet 30 mg, 30 mg, Oral, Daily  metoprolol tartrate (LOPRESSOR) tablet 100 mg, 100 mg, Oral, BID  0.9 % sodium chloride infusion, , Intravenous, PRN  sodium chloride flush 0.9 % injection 10 mL, 10 mL, Intravenous, 2 times per day  sodium chloride flush 0.9 % injection 10 mL, 10 mL, Intravenous, PRN  acetaminophen (TYLENOL) tablet 650 mg, 650 mg, Oral, Q6H PRN **OR** acetaminophen (TYLENOL) suppository 650 mg, 650 mg, Rectal, Q6H PRN  aspirin chewable tablet 81 mg, 81 mg, Oral, Daily  levothyroxine (SYNTHROID) tablet 75 mcg, 75 mcg, Oral, Daily  atorvastatin (LIPITOR) tablet 40 mg, 40 mg, Oral, Daily  insulin lispro (HUMALOG) injection vial 0-6 Units, 0-6 Units, Subcutaneous, TID WC  insulin lispro (HUMALOG) injection vial 0-3 Units, 0-3 Units, Subcutaneous, Nightly  glucose (GLUTOSE) 40 % oral gel 15 g, 15 g, Oral, PRN  dextrose 50 % IV solution, 12.5 g, Intravenous, PRN  glucagon (rDNA) injection 1 mg, 1 mg, Intramuscular, PRN  dextrose 5 % solution, 100 mL/hr, Intravenous, PRN  ondansetron (ZOFRAN) injection 4 mg, 4 mg, Intravenous, Q6H PRN  albuterol sulfate  (90 Base) MCG/ACT inhaler 2 puff, 2 puff, Inhalation, 4x daily **AND** ipratropium (ATROVENT HFA) 17 MCG/ACT inhaler 2 puff, 2 puff, Inhalation, 4x daily **AND** MDI Treatment, , , 4x daily  guaiFENesin-dextromethorphan (ROBITUSSIN DM) 100-10 MG/5ML syrup 5 mL, 5 mL, Oral, Q4H PRN  Vitamin D (CHOLECALCIFEROL) tablet 2,000 Units, 2,000 Units, Oral, Daily  anastrozole (ARIMIDEX) tablet 1 mg, 1 mg, Oral, Daily  DULoxetine (CYMBALTA) extended release capsule 60 mg, 60 mg, Oral, Daily  sodium chloride (OCEAN, BABY AYR) 0.65 % nasal spray 1 spray, 1 spray, Each Nostril, Q2H PRN    Data reviewed:  Labs:  CBC:   Recent Labs     01/04/21  1014 01/05/21  1006   WBC 14.7* 14.2*   HGB 12.7 12.8   HCT 38.3 39.0   MCV 91.6 92.7    445     BMP:   Recent Labs     01/03/21  1023 01/04/21  1014 01/05/21  1006    142 138   K 3.7 3.7 3.8    103 101   CO2 27 27 23   BUN 22* 25* 27*   CREATININE 0.5* 0.7 0.6     LIVER PROFILE:   Recent Labs     01/03/21  1023 01/04/21  1014 01/05/21  1006   AST 27 21 20   ALT 28 24 22   BILITOT 0.3 0.3 0.4   ALKPHOS 72 71 71     PT/INR:   Recent Labs     01/03/21  1023 01/04/21  1014 01/05/21  1006   PROTIME 12.8 12.8 12.5   INR 1.10 1.10 1.08     APTT:   Recent Labs     01/03/21  1023 01/04/21  1014 01/05/21  1006   APTT 28.6 27.4 27.4     Cultures:   Blood culture (12/28): Negative  Urine strep and Legionella antigens: Negative      Films:  Chest images and reports were reviewed by me. My interpretation is:  No new images    Assessment:     Acute hypoxic respiratory failure  COVID-19 pneumonia  Hyperglycemia  Leukocytosis      Plan:    Acute hypoxic respiratory failure  -Due to COVID-19 pneumonia  -On Vapotherm and nonrebreather mask. Will try to wean to Venti mask and Vapotherm. COVID-19 pneumonia  -Received 5 days of Decadron 20 mg daily. Now on Decadron 10 mg daily   -Received a course of remdesivir  -Received convalescent plasma on 12/30/2020    Hyperglycemia  -Sliding scale insulin    Leukocytosis  -May be due to steroids  -Check daily labs       Patient is at high risk given the presence of respiratory failure requiring the use of Vapotherm. Thank you for allowing me to participate in the care of this patient. Will follow.      Graeme Cooper MD  [facility] Pulmonary, Critical Care and Sleep

## 2021-01-06 LAB
A/G RATIO: 0.8 (ref 1.1–2.2)
ALBUMIN SERPL-MCNC: 2.5 G/DL (ref 3.4–5)
ALP BLD-CCNC: 78 U/L (ref 40–129)
ALT SERPL-CCNC: 20 U/L (ref 10–40)
ANION GAP SERPL CALCULATED.3IONS-SCNC: 11 MMOL/L (ref 3–16)
APTT: 24 SEC (ref 24.2–36.2)
AST SERPL-CCNC: 21 U/L (ref 15–37)
BASOPHILS ABSOLUTE: 0 K/UL (ref 0–0.2)
BASOPHILS RELATIVE PERCENT: 0.3 %
BILIRUB SERPL-MCNC: 0.4 MG/DL (ref 0–1)
BUN BLDV-MCNC: 25 MG/DL (ref 7–20)
C-REACTIVE PROTEIN: 46 MG/L (ref 0–5.1)
CALCIUM SERPL-MCNC: 8.6 MG/DL (ref 8.3–10.6)
CHLORIDE BLD-SCNC: 101 MMOL/L (ref 99–110)
CO2: 26 MMOL/L (ref 21–32)
CREAT SERPL-MCNC: 0.6 MG/DL (ref 0.6–1.2)
D DIMER: 4223 NG/ML DDU (ref 0–229)
EOSINOPHILS ABSOLUTE: 0 K/UL (ref 0–0.6)
EOSINOPHILS RELATIVE PERCENT: 0.4 %
FIBRINOGEN: 550 MG/DL (ref 200–397)
GFR AFRICAN AMERICAN: >60
GFR NON-AFRICAN AMERICAN: >60
GLOBULIN: 3.2 G/DL
GLUCOSE BLD-MCNC: 112 MG/DL (ref 70–99)
GLUCOSE BLD-MCNC: 134 MG/DL (ref 70–99)
GLUCOSE BLD-MCNC: 135 MG/DL (ref 70–99)
GLUCOSE BLD-MCNC: 147 MG/DL (ref 70–99)
GLUCOSE BLD-MCNC: 215 MG/DL (ref 70–99)
GLUCOSE BLD-MCNC: 91 MG/DL (ref 70–99)
HCT VFR BLD CALC: 40.2 % (ref 36–48)
HEMOGLOBIN: 13.2 G/DL (ref 12–16)
INR BLD: 1.05 (ref 0.86–1.14)
LYMPHOCYTES ABSOLUTE: 0.7 K/UL (ref 1–5.1)
LYMPHOCYTES RELATIVE PERCENT: 6.1 %
MCH RBC QN AUTO: 30.3 PG (ref 26–34)
MCHC RBC AUTO-ENTMCNC: 32.7 G/DL (ref 31–36)
MCV RBC AUTO: 92.8 FL (ref 80–100)
MONOCYTES ABSOLUTE: 0.8 K/UL (ref 0–1.3)
MONOCYTES RELATIVE PERCENT: 7.3 %
NEUTROPHILS ABSOLUTE: 9.9 K/UL (ref 1.7–7.7)
NEUTROPHILS RELATIVE PERCENT: 85.9 %
PDW BLD-RTO: 14.8 % (ref 12.4–15.4)
PERFORMED ON: ABNORMAL
PLATELET # BLD: 386 K/UL (ref 135–450)
PMV BLD AUTO: 8.2 FL (ref 5–10.5)
POTASSIUM REFLEX MAGNESIUM: 4 MMOL/L (ref 3.5–5.1)
PROCALCITONIN: 0.06 NG/ML (ref 0–0.15)
PROTHROMBIN TIME: 12.2 SEC (ref 10–13.2)
RBC # BLD: 4.34 M/UL (ref 4–5.2)
SODIUM BLD-SCNC: 138 MMOL/L (ref 136–145)
TOTAL PROTEIN: 5.7 G/DL (ref 6.4–8.2)
WBC # BLD: 11.6 K/UL (ref 4–11)

## 2021-01-06 PROCEDURE — 2060000000 HC ICU INTERMEDIATE R&B

## 2021-01-06 PROCEDURE — 6360000002 HC RX W HCPCS: Performed by: STUDENT IN AN ORGANIZED HEALTH CARE EDUCATION/TRAINING PROGRAM

## 2021-01-06 PROCEDURE — 85025 COMPLETE CBC W/AUTO DIFF WBC: CPT

## 2021-01-06 PROCEDURE — 85730 THROMBOPLASTIN TIME PARTIAL: CPT

## 2021-01-06 PROCEDURE — 6370000000 HC RX 637 (ALT 250 FOR IP): Performed by: STUDENT IN AN ORGANIZED HEALTH CARE EDUCATION/TRAINING PROGRAM

## 2021-01-06 PROCEDURE — 36415 COLL VENOUS BLD VENIPUNCTURE: CPT

## 2021-01-06 PROCEDURE — 6360000002 HC RX W HCPCS: Performed by: INTERNAL MEDICINE

## 2021-01-06 PROCEDURE — 6370000000 HC RX 637 (ALT 250 FOR IP): Performed by: FAMILY MEDICINE

## 2021-01-06 PROCEDURE — 94640 AIRWAY INHALATION TREATMENT: CPT

## 2021-01-06 PROCEDURE — 85610 PROTHROMBIN TIME: CPT

## 2021-01-06 PROCEDURE — 85384 FIBRINOGEN ACTIVITY: CPT

## 2021-01-06 PROCEDURE — 86140 C-REACTIVE PROTEIN: CPT

## 2021-01-06 PROCEDURE — 80053 COMPREHEN METABOLIC PANEL: CPT

## 2021-01-06 PROCEDURE — 94761 N-INVAS EAR/PLS OXIMETRY MLT: CPT

## 2021-01-06 PROCEDURE — 2700000000 HC OXYGEN THERAPY PER DAY

## 2021-01-06 PROCEDURE — 84145 PROCALCITONIN (PCT): CPT

## 2021-01-06 PROCEDURE — 6370000000 HC RX 637 (ALT 250 FOR IP): Performed by: INTERNAL MEDICINE

## 2021-01-06 PROCEDURE — 85379 FIBRIN DEGRADATION QUANT: CPT

## 2021-01-06 PROCEDURE — 2580000003 HC RX 258: Performed by: STUDENT IN AN ORGANIZED HEALTH CARE EDUCATION/TRAINING PROGRAM

## 2021-01-06 RX ORDER — POLYETHYLENE GLYCOL 3350 17 G/17G
17 POWDER, FOR SOLUTION ORAL DAILY
Status: DISCONTINUED | OUTPATIENT
Start: 2021-01-06 | End: 2021-01-23 | Stop reason: HOSPADM

## 2021-01-06 RX ADMIN — GUAIFENESIN AND DEXTROMETHORPHAN 5 ML: 100; 10 SYRUP ORAL at 16:04

## 2021-01-06 RX ADMIN — SODIUM CHLORIDE, PRESERVATIVE FREE 10 ML: 5 INJECTION INTRAVENOUS at 09:27

## 2021-01-06 RX ADMIN — ONDANSETRON 4 MG: 2 INJECTION INTRAMUSCULAR; INTRAVENOUS at 10:21

## 2021-01-06 RX ADMIN — POLYETHYLENE GLYCOL 3350 17 G: 17 POWDER, FOR SOLUTION ORAL at 09:17

## 2021-01-06 RX ADMIN — IPRATROPIUM BROMIDE 2 PUFF: 17 AEROSOL, METERED RESPIRATORY (INHALATION) at 15:49

## 2021-01-06 RX ADMIN — DULOXETINE HYDROCHLORIDE 60 MG: 60 CAPSULE, DELAYED RELEASE ORAL at 09:10

## 2021-01-06 RX ADMIN — ENOXAPARIN SODIUM 40 MG: 40 INJECTION SUBCUTANEOUS at 21:46

## 2021-01-06 RX ADMIN — ACETAMINOPHEN 650 MG: 325 TABLET ORAL at 09:09

## 2021-01-06 RX ADMIN — IPRATROPIUM BROMIDE 2 PUFF: 17 AEROSOL, METERED RESPIRATORY (INHALATION) at 13:10

## 2021-01-06 RX ADMIN — ACETAMINOPHEN 650 MG: 325 TABLET ORAL at 21:46

## 2021-01-06 RX ADMIN — ALBUTEROL SULFATE 2 PUFF: 90 AEROSOL, METERED RESPIRATORY (INHALATION) at 13:09

## 2021-01-06 RX ADMIN — DEXAMETHASONE SODIUM PHOSPHATE 10 MG: 10 INJECTION, SOLUTION INTRAMUSCULAR; INTRAVENOUS at 09:11

## 2021-01-06 RX ADMIN — GUAIFENESIN AND DEXTROMETHORPHAN 5 ML: 100; 10 SYRUP ORAL at 09:10

## 2021-01-06 RX ADMIN — Medication 2000 UNITS: at 09:16

## 2021-01-06 RX ADMIN — ENOXAPARIN SODIUM 40 MG: 40 INJECTION SUBCUTANEOUS at 09:23

## 2021-01-06 RX ADMIN — IPRATROPIUM BROMIDE 2 PUFF: 17 AEROSOL, METERED RESPIRATORY (INHALATION) at 09:11

## 2021-01-06 RX ADMIN — ANASTROZOLE 1 MG: 1 TABLET ORAL at 09:12

## 2021-01-06 RX ADMIN — ALBUTEROL SULFATE 2 PUFF: 90 AEROSOL, METERED RESPIRATORY (INHALATION) at 15:49

## 2021-01-06 RX ADMIN — ALBUTEROL SULFATE 2 PUFF: 90 AEROSOL, METERED RESPIRATORY (INHALATION) at 09:11

## 2021-01-06 RX ADMIN — LEVOTHYROXINE SODIUM 75 MCG: 0.07 TABLET ORAL at 06:08

## 2021-01-06 RX ADMIN — Medication 2 PUFF: at 09:11

## 2021-01-06 RX ADMIN — METOPROLOL TARTRATE 100 MG: 50 TABLET, FILM COATED ORAL at 21:47

## 2021-01-06 RX ADMIN — GUAIFENESIN AND DEXTROMETHORPHAN 5 ML: 100; 10 SYRUP ORAL at 21:46

## 2021-01-06 RX ADMIN — ALBUTEROL SULFATE 2 PUFF: 90 AEROSOL, METERED RESPIRATORY (INHALATION) at 19:41

## 2021-01-06 RX ADMIN — SODIUM CHLORIDE, PRESERVATIVE FREE 10 ML: 5 INJECTION INTRAVENOUS at 21:47

## 2021-01-06 RX ADMIN — ACETAMINOPHEN 650 MG: 325 TABLET ORAL at 16:03

## 2021-01-06 RX ADMIN — Medication 2 PUFF: at 19:41

## 2021-01-06 RX ADMIN — IPRATROPIUM BROMIDE 2 PUFF: 17 AEROSOL, METERED RESPIRATORY (INHALATION) at 19:41

## 2021-01-06 RX ADMIN — ASPIRIN 81 MG 81 MG: 81 TABLET ORAL at 09:09

## 2021-01-06 RX ADMIN — ONDANSETRON 4 MG: 2 INJECTION INTRAMUSCULAR; INTRAVENOUS at 02:50

## 2021-01-06 RX ADMIN — ATORVASTATIN CALCIUM 40 MG: 40 TABLET, FILM COATED ORAL at 09:10

## 2021-01-06 ASSESSMENT — PAIN DESCRIPTION - PAIN TYPE: TYPE: ACUTE PAIN

## 2021-01-06 ASSESSMENT — PAIN SCALES - GENERAL
PAINLEVEL_OUTOF10: 4
PAINLEVEL_OUTOF10: 0

## 2021-01-06 ASSESSMENT — PAIN - FUNCTIONAL ASSESSMENT: PAIN_FUNCTIONAL_ASSESSMENT: ACTIVITIES ARE NOT PREVENTED

## 2021-01-06 ASSESSMENT — PAIN DESCRIPTION - DESCRIPTORS: DESCRIPTORS: HEADACHE

## 2021-01-06 NOTE — PROGRESS NOTES
Patient unable to report known last BM.      Patient very anxious this morning     MD on unit- discussed patient status- see orders

## 2021-01-06 NOTE — PROGRESS NOTES
Update given to patients family Christine Faustin. Christine Faustin requesting to speak to MD for plan of care.  Secure message sent to Dr Edouard Jackman

## 2021-01-06 NOTE — PROGRESS NOTES
Patient oxygen saturation remaining 86-87% on 40 LPM Vapotherm with Venturi mask at 40% FiO2. Patient up in chair sitting still and awake at this time. Switched to 12 LPM NRB mask instead of Venturi to recover sats.

## 2021-01-06 NOTE — PROGRESS NOTES
Hospitalist Progress Note      PCP: Wing Azucena MD    Date of Admission: 12/28/2020      Hospital Course: admitted with non productive cough and hypoxia, with COVID 19 PNA     Subjective:    Patient seen and examined. Still on vapotherm 40 l., + NRB  Saturations about the same today. Was anxious this morning but seems to have improved during interview. Medications:  Reviewed    Infusion Medications    sodium chloride      dextrose       Scheduled Medications    polyethylene glycol  17 g Oral Daily    budesonide-formoterol  2 puff Inhalation BID    enoxaparin  40 mg Subcutaneous BID    dexamethasone  10 mg Intravenous Daily    amLODIPine  5 mg Oral Daily    lisinopril  30 mg Oral Daily    metoprolol tartrate  100 mg Oral BID    sodium chloride flush  10 mL Intravenous 2 times per day    aspirin  81 mg Oral Daily    levothyroxine  75 mcg Oral Daily    atorvastatin  40 mg Oral Daily    insulin lispro  0-6 Units Subcutaneous TID WC    insulin lispro  0-3 Units Subcutaneous Nightly    albuterol sulfate HFA  2 puff Inhalation 4x daily    And    ipratropium  2 puff Inhalation 4x daily    Vitamin D  2,000 Units Oral Daily    anastrozole  1 mg Oral Daily    DULoxetine  60 mg Oral Daily     PRN Meds: sodium chloride, sodium chloride flush, acetaminophen **OR** acetaminophen, glucose, dextrose, glucagon (rDNA), dextrose, ondansetron, guaiFENesin-dextromethorphan, sodium chloride      Intake/Output Summary (Last 24 hours) at 1/6/2021 1147  Last data filed at 1/6/2021 0903  Gross per 24 hour   Intake 480 ml   Output 575 ml   Net -95 ml       Physical Exam Performed:    /72   Pulse 97   Temp 97.5 °F (36.4 °C) (Axillary)   Resp 19   Ht 5' 6\" (1.676 m)   Wt 189 lb 9.5 oz (86 kg)   SpO2 90%   BMI 30.60 kg/m²     General appearance: No apparent distress, alert and cooperative. HEENT: Conjunctivae/corneas clear, neck supple w/ full ROM  Respiratory:  Normal respiratory effort.  Faint [Z20.822]    Hypothyroidism [E03.9]    HLD (hyperlipidemia) [E78.5]    PCOS (polycystic ovarian syndrome) [E28.2]    Pneumonia due to organism [J18.9]     COVID 19 PNA  - c/w decadron  Increased to 20 mg x 5 days then 10 mg x 5 days  - completed remdesivir day 5/5  - s/p convalescent plasma 12/30/2020  - monitor inflammatory markers  - added Symbicort and continue albuterol/ipatropium  - PLEASE AVOID NARCOTICS    Acute respiratory failure with hypoxia -   - worsening, now on vapotherm + NRB  - consulted pulmonology     HTN - elevated  - c/w bb, acei  - resume home norvasc    Hx breast cancer  - c/w arimidex    HLD  - c/w statin    Hypothroidism  - c/w levothyroxine     DMT2  - hold metformin  - ssi, monitor on steroids  - a1c - 6.0    Depression  - c/w home meds    DVT Prophylaxis: lovenox bid  Diet: DIET LOW SODIUM 2 GM; Carb Control: 4 carb choices (60 gms)/meal  Dietary Nutrition Supplements: Diabetic Oral Supplement  Code Status: Full Code      Dispo - continue care. Prognosis guarded.      Kyle Lezama MD

## 2021-01-07 LAB
A/G RATIO: 0.9 (ref 1.1–2.2)
ALBUMIN SERPL-MCNC: 2.6 G/DL (ref 3.4–5)
ALP BLD-CCNC: 72 U/L (ref 40–129)
ALT SERPL-CCNC: 17 U/L (ref 10–40)
ANION GAP SERPL CALCULATED.3IONS-SCNC: 7 MMOL/L (ref 3–16)
APTT: 28.1 SEC (ref 24.2–36.2)
AST SERPL-CCNC: 16 U/L (ref 15–37)
BASOPHILS ABSOLUTE: 0 K/UL (ref 0–0.2)
BASOPHILS RELATIVE PERCENT: 0.2 %
BILIRUB SERPL-MCNC: 0.3 MG/DL (ref 0–1)
BUN BLDV-MCNC: 25 MG/DL (ref 7–20)
CALCIUM SERPL-MCNC: 8.8 MG/DL (ref 8.3–10.6)
CHLORIDE BLD-SCNC: 103 MMOL/L (ref 99–110)
CO2: 32 MMOL/L (ref 21–32)
CREAT SERPL-MCNC: 0.8 MG/DL (ref 0.6–1.2)
EOSINOPHILS ABSOLUTE: 0 K/UL (ref 0–0.6)
EOSINOPHILS RELATIVE PERCENT: 0.1 %
FIBRINOGEN: 635 MG/DL (ref 200–397)
GFR AFRICAN AMERICAN: >60
GFR NON-AFRICAN AMERICAN: >60
GLOBULIN: 3 G/DL
GLUCOSE BLD-MCNC: 106 MG/DL (ref 70–99)
GLUCOSE BLD-MCNC: 118 MG/DL (ref 70–99)
GLUCOSE BLD-MCNC: 126 MG/DL (ref 70–99)
GLUCOSE BLD-MCNC: 160 MG/DL (ref 70–99)
GLUCOSE BLD-MCNC: 161 MG/DL (ref 70–99)
HCT VFR BLD CALC: 38.4 % (ref 36–48)
HEMOGLOBIN: 12.1 G/DL (ref 12–16)
INR BLD: 1.06 (ref 0.86–1.14)
LYMPHOCYTES ABSOLUTE: 0.6 K/UL (ref 1–5.1)
LYMPHOCYTES RELATIVE PERCENT: 4.7 %
MCH RBC QN AUTO: 29.2 PG (ref 26–34)
MCHC RBC AUTO-ENTMCNC: 31.5 G/DL (ref 31–36)
MCV RBC AUTO: 92.8 FL (ref 80–100)
MONOCYTES ABSOLUTE: 1 K/UL (ref 0–1.3)
MONOCYTES RELATIVE PERCENT: 7.5 %
NEUTROPHILS ABSOLUTE: 11.5 K/UL (ref 1.7–7.7)
NEUTROPHILS RELATIVE PERCENT: 87.5 %
PDW BLD-RTO: 14.2 % (ref 12.4–15.4)
PERFORMED ON: ABNORMAL
PLATELET # BLD: 403 K/UL (ref 135–450)
PMV BLD AUTO: 8.7 FL (ref 5–10.5)
POTASSIUM REFLEX MAGNESIUM: 4.6 MMOL/L (ref 3.5–5.1)
PROTHROMBIN TIME: 12.3 SEC (ref 10–13.2)
RBC # BLD: 4.14 M/UL (ref 4–5.2)
SODIUM BLD-SCNC: 142 MMOL/L (ref 136–145)
TOTAL PROTEIN: 5.6 G/DL (ref 6.4–8.2)
WBC # BLD: 13.1 K/UL (ref 4–11)

## 2021-01-07 PROCEDURE — 2700000000 HC OXYGEN THERAPY PER DAY

## 2021-01-07 PROCEDURE — 36415 COLL VENOUS BLD VENIPUNCTURE: CPT

## 2021-01-07 PROCEDURE — 94761 N-INVAS EAR/PLS OXIMETRY MLT: CPT

## 2021-01-07 PROCEDURE — 97166 OT EVAL MOD COMPLEX 45 MIN: CPT

## 2021-01-07 PROCEDURE — 2580000003 HC RX 258: Performed by: STUDENT IN AN ORGANIZED HEALTH CARE EDUCATION/TRAINING PROGRAM

## 2021-01-07 PROCEDURE — 6370000000 HC RX 637 (ALT 250 FOR IP): Performed by: STUDENT IN AN ORGANIZED HEALTH CARE EDUCATION/TRAINING PROGRAM

## 2021-01-07 PROCEDURE — 6360000002 HC RX W HCPCS: Performed by: INTERNAL MEDICINE

## 2021-01-07 PROCEDURE — 97162 PT EVAL MOD COMPLEX 30 MIN: CPT

## 2021-01-07 PROCEDURE — 85610 PROTHROMBIN TIME: CPT

## 2021-01-07 PROCEDURE — 97110 THERAPEUTIC EXERCISES: CPT

## 2021-01-07 PROCEDURE — 6370000000 HC RX 637 (ALT 250 FOR IP): Performed by: INTERNAL MEDICINE

## 2021-01-07 PROCEDURE — 85384 FIBRINOGEN ACTIVITY: CPT

## 2021-01-07 PROCEDURE — 2060000000 HC ICU INTERMEDIATE R&B

## 2021-01-07 PROCEDURE — 94640 AIRWAY INHALATION TREATMENT: CPT

## 2021-01-07 PROCEDURE — 6370000000 HC RX 637 (ALT 250 FOR IP): Performed by: FAMILY MEDICINE

## 2021-01-07 PROCEDURE — 97530 THERAPEUTIC ACTIVITIES: CPT

## 2021-01-07 PROCEDURE — 99232 SBSQ HOSP IP/OBS MODERATE 35: CPT | Performed by: INTERNAL MEDICINE

## 2021-01-07 PROCEDURE — 85025 COMPLETE CBC W/AUTO DIFF WBC: CPT

## 2021-01-07 PROCEDURE — 80053 COMPREHEN METABOLIC PANEL: CPT

## 2021-01-07 PROCEDURE — 85730 THROMBOPLASTIN TIME PARTIAL: CPT

## 2021-01-07 RX ADMIN — Medication 2000 UNITS: at 08:43

## 2021-01-07 RX ADMIN — METOPROLOL TARTRATE 100 MG: 50 TABLET, FILM COATED ORAL at 08:43

## 2021-01-07 RX ADMIN — LEVOTHYROXINE SODIUM 75 MCG: 0.07 TABLET ORAL at 08:43

## 2021-01-07 RX ADMIN — ANASTROZOLE 1 MG: 1 TABLET ORAL at 08:53

## 2021-01-07 RX ADMIN — METOPROLOL TARTRATE 100 MG: 50 TABLET, FILM COATED ORAL at 21:16

## 2021-01-07 RX ADMIN — ALBUTEROL SULFATE 2 PUFF: 90 AEROSOL, METERED RESPIRATORY (INHALATION) at 07:47

## 2021-01-07 RX ADMIN — GUAIFENESIN AND DEXTROMETHORPHAN 5 ML: 100; 10 SYRUP ORAL at 17:35

## 2021-01-07 RX ADMIN — ALBUTEROL SULFATE 2 PUFF: 90 AEROSOL, METERED RESPIRATORY (INHALATION) at 19:35

## 2021-01-07 RX ADMIN — ACETAMINOPHEN 650 MG: 325 TABLET ORAL at 17:35

## 2021-01-07 RX ADMIN — Medication 2 PUFF: at 19:35

## 2021-01-07 RX ADMIN — GUAIFENESIN AND DEXTROMETHORPHAN 5 ML: 100; 10 SYRUP ORAL at 08:42

## 2021-01-07 RX ADMIN — IPRATROPIUM BROMIDE 2 PUFF: 17 AEROSOL, METERED RESPIRATORY (INHALATION) at 19:35

## 2021-01-07 RX ADMIN — LISINOPRIL 30 MG: 10 TABLET ORAL at 08:43

## 2021-01-07 RX ADMIN — Medication 2 PUFF: at 07:47

## 2021-01-07 RX ADMIN — ACETAMINOPHEN 650 MG: 325 TABLET ORAL at 21:49

## 2021-01-07 RX ADMIN — ATORVASTATIN CALCIUM 40 MG: 40 TABLET, FILM COATED ORAL at 08:44

## 2021-01-07 RX ADMIN — GUAIFENESIN AND DEXTROMETHORPHAN 5 ML: 100; 10 SYRUP ORAL at 02:12

## 2021-01-07 RX ADMIN — AMLODIPINE BESYLATE 5 MG: 5 TABLET ORAL at 08:43

## 2021-01-07 RX ADMIN — INSULIN LISPRO 1 UNITS: 100 INJECTION, SOLUTION INTRAVENOUS; SUBCUTANEOUS at 17:35

## 2021-01-07 RX ADMIN — ASPIRIN 81 MG 81 MG: 81 TABLET ORAL at 08:42

## 2021-01-07 RX ADMIN — SODIUM CHLORIDE, PRESERVATIVE FREE 10 ML: 5 INJECTION INTRAVENOUS at 21:48

## 2021-01-07 RX ADMIN — INSULIN LISPRO 1 UNITS: 100 INJECTION, SOLUTION INTRAVENOUS; SUBCUTANEOUS at 21:17

## 2021-01-07 RX ADMIN — GUAIFENESIN AND DEXTROMETHORPHAN 5 ML: 100; 10 SYRUP ORAL at 21:50

## 2021-01-07 RX ADMIN — DEXAMETHASONE SODIUM PHOSPHATE 10 MG: 10 INJECTION, SOLUTION INTRAMUSCULAR; INTRAVENOUS at 08:43

## 2021-01-07 RX ADMIN — IPRATROPIUM BROMIDE 2 PUFF: 17 AEROSOL, METERED RESPIRATORY (INHALATION) at 12:25

## 2021-01-07 RX ADMIN — POLYETHYLENE GLYCOL 3350 17 G: 17 POWDER, FOR SOLUTION ORAL at 08:43

## 2021-01-07 RX ADMIN — DULOXETINE HYDROCHLORIDE 60 MG: 60 CAPSULE, DELAYED RELEASE ORAL at 08:43

## 2021-01-07 RX ADMIN — ENOXAPARIN SODIUM 40 MG: 40 INJECTION SUBCUTANEOUS at 21:16

## 2021-01-07 RX ADMIN — ALBUTEROL SULFATE 2 PUFF: 90 AEROSOL, METERED RESPIRATORY (INHALATION) at 16:03

## 2021-01-07 RX ADMIN — SODIUM CHLORIDE, PRESERVATIVE FREE 10 ML: 5 INJECTION INTRAVENOUS at 08:44

## 2021-01-07 RX ADMIN — ENOXAPARIN SODIUM 40 MG: 40 INJECTION SUBCUTANEOUS at 08:43

## 2021-01-07 RX ADMIN — ALBUTEROL SULFATE 2 PUFF: 90 AEROSOL, METERED RESPIRATORY (INHALATION) at 12:25

## 2021-01-07 RX ADMIN — IPRATROPIUM BROMIDE 2 PUFF: 17 AEROSOL, METERED RESPIRATORY (INHALATION) at 16:03

## 2021-01-07 RX ADMIN — IPRATROPIUM BROMIDE 2 PUFF: 17 AEROSOL, METERED RESPIRATORY (INHALATION) at 07:47

## 2021-01-07 ASSESSMENT — PAIN DESCRIPTION - ORIENTATION: ORIENTATION: MID

## 2021-01-07 ASSESSMENT — PAIN DESCRIPTION - DESCRIPTORS: DESCRIPTORS: ACHING

## 2021-01-07 ASSESSMENT — PAIN DESCRIPTION - ONSET: ONSET: ON-GOING

## 2021-01-07 ASSESSMENT — PAIN SCALES - GENERAL
PAINLEVEL_OUTOF10: 0
PAINLEVEL_OUTOF10: 3
PAINLEVEL_OUTOF10: 3
PAINLEVEL_OUTOF10: 0

## 2021-01-07 ASSESSMENT — PAIN DESCRIPTION - PROGRESSION: CLINICAL_PROGRESSION: NOT CHANGED

## 2021-01-07 ASSESSMENT — PAIN - FUNCTIONAL ASSESSMENT: PAIN_FUNCTIONAL_ASSESSMENT: ACTIVITIES ARE NOT PREVENTED

## 2021-01-07 NOTE — PROGRESS NOTES
Physical Therapy    Facility/Department: [address] PROGRESSIVE CARE  Initial Assessment    NAME: Madonna Batres  : 1955  MRN: [id number]    Date of Service: 2021    Discharge Recommendations:  Continue to assess pending progress   PT Equipment Recommendations  Other: Will monitor for potential equipt needs. Assessment   Body structures, Functions, Activity limitations: Decreased functional mobility ; Decreased endurance  Assessment: 64 y/o female admit 2020 with Pneumonia, COVID +. PMH as noted including Breast Ca/Lumpect, Scoliosis, Back Surg. PTA pt living with son in home with 1st floor bed/bath. 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 Unclear d/c plan. Will need to monitor pt's progress as O2 requirements cont decrease. Prognosis: Fair  Decision Making: Medium Complexity  History: 64 y/o female admit 2020 with Pneumonia, COVID +. PMH as noted including Breast Ca/Lumpect, Scoliosis, Back Surg. Exam: See above. Clinical Presentation: See above. Patient Education: Role of PT, POC, Need to call for assist, Optimal Breathing Techniques, Optimal Positioning (pt declined to attempt Prone positioning due to chronic back/surg). Barriers to Learning: Endurance. REQUIRES PT FOLLOW UP: Yes  Activity Tolerance  Activity Tolerance: Patient limited by endurance       Patient Diagnosis(es): The primary encounter diagnosis was Pneumonia due to organism. A diagnosis of COVID-19 virus test result unknown was also pertinent to this visit. has a past medical history of Arthritis, Breast cancer ([address] Depression, Hypertension, Obesity, Peptic ulcer disease, Scoliosis, Skin cancer, and Thyroid disease. has a past surgical history that includes Breast biopsy; Breast lumpectomy; Hysterectomy; Ovary removal; back surgery; Dilation and curettage of uterus;  Carpal tunnel release; and Thyroid surgery. Restrictions  Restrictions/Precautions  Restrictions/Precautions: Isolation  Position Activity Restriction  Other position/activity restrictions: Droplet Plus :  O2 40L via Vapotherm + NRB. Vision/Hearing  Vision: Within Functional Limits  Hearing: Within functional limits     Subjective  General  Chart Reviewed: Yes  Patient assessed for rehabilitation services?: Yes  Additional Pertinent Hx: 64 y/o female admit 12/28/2020 with Pneumonia, COVID +. PMH as noted including Breast Ca/Lumpect, Scoliosis, Back Surg. Family / Caregiver Present: No  Referring Practitioner: Dr. Luna Maynard  Diagnosis: Pneumonia, COVID +. Follows Commands: Within Functional Limits  Subjective  Subjective: Pt agreeable to PT Eval/Rx.           Orientation  Orientation  Overall Orientation Status: Within Functional Limits  Social/Functional History  Social/Functional History  Lives With: Son(son, works 3pm-11pm)  Type of Home: House  Home Layout: One level  Home Access: Stairs to enter without rails  Entrance Stairs - Number of Steps: 1 step up from garage  Bathroom Shower/Tub: Walk-in shower  Bathroom Toilet: [address]: [address] [place] [address] Avenue: [address] [place] [address] Place: Independent(typically without device, though uses SPC prn)  Transfer Assistance: Independent  Active : Yes  Occupation: Part time employment  Type of occupation: various jobs, cleaning business  Cognition   Cognition  Overall Cognitive Status: WFL    Objective          AROM RLE (degrees)  RLE AROM: WFL  AROM LLE (degrees)  LLE AROM : WFL  AROM RUE (degrees)  RUE AROM : WFL  AROM LUE (degrees)  LUE AROM : WFL  Strength RLE  Strength RLE: WFL  Strength LLE  Strength LLE: WFL  Strength RUE  Strength RUE: WFL  Strength LUE  Strength LUE: WFL        Bed mobility  Supine to Sit: Supervision  Transfers  Sit to Stand: Supervision  Stand to sit: Supervision  Ambulation  Ambulation?: Yes  Ambulation 1  Device: No Device  Distance: Pt amb ~10' at bedside without assist device Supervision. No LE buckling/giving way. Sats remain at least 90% (with NRB and O2 40L). Comments: Cues for optimal breathing with use high flow O2. Pt refused to attempt any Prone Positioning. Did agree to gentle Chest PT while sitting in chair leaning forward with head on 2 pillows (placed on lap). Plan   Plan  Times per week: 3-5 x week while in acute care setting. Current Treatment Recommendations: Functional Mobility Training, Endurance Training, Safety Education & Training, Patient/Caregiver Education & Training  Safety Devices  Type of devices: Call light within reach, Left in chair, Nurse notified      Goals  Short term goals  Time Frame for Short term goals: Upon d/c acute care setting. Short term goal 1: Transfers Independent. Short term goal 2: Amb with/without assist device 50-75' Independent, sats remain at least 88-90%. Patient Goals   Patient goals : Get better and go home.        Therapy Time   Individual Concurrent Group Co-treatment   Time In 1250         Time Out 1330         Minutes Mc 1334 Mikala Calzada

## 2021-01-07 NOTE — PROGRESS NOTES
Pulmonary Progress Note    CC: Acute hypoxic respiratory failure  COVID-19 pneumonia  Hyperglycemia  Leukocytosis    24 hr events:  She remains on vapotherm and non re-breather mask. Tried weaning today, but saturation decreased to 83% within a few minutes. ROS:  No SOB  +cough  No vomiting       PHYSICAL EXAM:  Blood pressure 111/72, pulse 70, temperature 98.1 °F (36.7 °C), temperature source Axillary, resp. rate 18, height 5' 6\" (1.676 m), weight 189 lb 9.5 oz (86 kg), SpO2 92 %. On Vapotherm 40 L, 100% and nonrebreather mask    Intake/Output Summary (Last 24 hours) at 1/7/2021 1354  Last data filed at 1/7/2021 1130  Gross per 24 hour   Intake 480 ml   Output 675 ml   Net -195 ml       Gen: Well developed; well nourished  Eyes: No scleral icterus. No conjunctival injection. ENT:  External appearance of ears and nose normal.  Neck: Trachea midline. No obvious mass. No visible thyroid enlargement    Respiratory: Crackles bilaterally, no accessory muscle use  Cardiovascular: Regular rate and rhythm, no appreciable murmurs. No edema. Skin: Warm and dry. No rashes or ulcers on visible areas. Normal texture and turgor  Musculoskeletal: No cyanosis, clubbing or joint deformity.     Psychiatric: Normal mood and affect; exhibits normal insight and judgement     Medications:  Current Facility-Administered Medications: polyethylene glycol (GLYCOLAX) packet 17 g, 17 g, Oral, Daily  budesonide-formoterol (SYMBICORT) 160-4.5 MCG/ACT inhaler 2 puff, 2 puff, Inhalation, BID  enoxaparin (LOVENOX) injection 40 mg, 40 mg, Subcutaneous, BID  dexamethasone (PF) (DECADRON) injection 10 mg, 10 mg, Intravenous, Daily  amLODIPine (NORVASC) tablet 5 mg, 5 mg, Oral, Daily  lisinopril (PRINIVIL;ZESTRIL) tablet 30 mg, 30 mg, Oral, Daily  metoprolol tartrate (LOPRESSOR) tablet 100 mg, 100 mg, Oral, BID  0.9 % sodium chloride infusion, , Intravenous, PRN  sodium chloride flush 0.9 % injection 10 mL, 10 mL, Intravenous, 2 times per day  sodium chloride flush 0.9 % injection 10 mL, 10 mL, Intravenous, PRN  acetaminophen (TYLENOL) tablet 650 mg, 650 mg, Oral, Q6H PRN **OR** acetaminophen (TYLENOL) suppository 650 mg, 650 mg, Rectal, Q6H PRN  aspirin chewable tablet 81 mg, 81 mg, Oral, Daily  levothyroxine (SYNTHROID) tablet 75 mcg, 75 mcg, Oral, Daily  atorvastatin (LIPITOR) tablet 40 mg, 40 mg, Oral, Daily  insulin lispro (HUMALOG) injection vial 0-6 Units, 0-6 Units, Subcutaneous, TID WC  insulin lispro (HUMALOG) injection vial 0-3 Units, 0-3 Units, Subcutaneous, Nightly  glucose (GLUTOSE) 40 % oral gel 15 g, 15 g, Oral, PRN  dextrose 50 % IV solution, 12.5 g, Intravenous, PRN  glucagon (rDNA) injection 1 mg, 1 mg, Intramuscular, PRN  dextrose 5 % solution, 100 mL/hr, Intravenous, PRN  ondansetron (ZOFRAN) injection 4 mg, 4 mg, Intravenous, Q6H PRN  albuterol sulfate  (90 Base) MCG/ACT inhaler 2 puff, 2 puff, Inhalation, 4x daily **AND** ipratropium (ATROVENT HFA) 17 MCG/ACT inhaler 2 puff, 2 puff, Inhalation, 4x daily **AND** MDI Treatment, , , 4x daily  guaiFENesin-dextromethorphan (ROBITUSSIN DM) 100-10 MG/5ML syrup 5 mL, 5 mL, Oral, Q4H PRN  Vitamin D (CHOLECALCIFEROL) tablet 2,000 Units, 2,000 Units, Oral, Daily  anastrozole (ARIMIDEX) tablet 1 mg, 1 mg, Oral, Daily  DULoxetine (CYMBALTA) extended release capsule 60 mg, 60 mg, Oral, Daily  sodium chloride (OCEAN, BABY AYR) 0.65 % nasal spray 1 spray, 1 spray, Each Nostril, Q2H PRN    Data reviewed:  Labs:  CBC:   Recent Labs     01/05/21  1006 01/06/21  0532 01/07/21  0646   WBC 14.2* 11.6* 13.1*   HGB 12.8 13.2 12.1   HCT 39.0 40.2 38.4   MCV 92.7 92.8 92.8    386 403     BMP:   Recent Labs     01/05/21  1006 01/06/21  0532 01/07/21  0646    138 142   K 3.8 4.0 4.6    101 103   CO2 23 26 32   BUN 27* 25* 25*   CREATININE 0.6 0.6 0.8     LIVER PROFILE:   Recent Labs     01/05/21  1006 01/06/21  0532 01/07/21  0646   AST 20 21 16   ALT 22 20 17   BILITOT 0.4 0.4

## 2021-01-07 NOTE — PROGRESS NOTES
Interventions:   Food and/or Nutrient Delivery:  Continue Current Diet, Discontinue Oral Nutrition Supplement  Nutrition Education/Counseling:  Education not indicated   Coordination of Nutrition Care:  Continue to monitor while inpatient    Goals:  Meal and supplement intake greater than 50%       Nutrition Monitoring and Evaluation:   Behavioral-Environmental Outcomes:  None Identified   Food/Nutrient Intake Outcomes:  Food and Nutrient Intake  Physical Signs/Symptoms Outcomes:  Biochemical Data, Fluid Status or Edema, Weight, Other (Comment)(Respiratory status)     Discharge Planning:     Too soon to determine     Electronically signed by Hunter Obando RD, LD on 1/7/21 at 11:16 AM EST    Contact: [phone]

## 2021-01-07 NOTE — PLAN OF CARE
Problem: Nutrition  Goal: Optimal nutrition therapy  Outcome: Ongoing   Nutrition Problem #1: Inadequate oral intake  Intervention: Food and/or Nutrient Delivery: Continue Current Diet, Discontinue Oral Nutrition Supplement  Nutritional Goals: Meal and supplement intake greater than 50%

## 2021-01-07 NOTE — PROGRESS NOTES
Hospitalist Progress Note      PCP: Gemini Squires MD    Date of Admission: 12/28/2020      Hospital Course: admitted with non productive cough and hypoxia, with COVID 19 PNA     Subjective:    Patient seen and examined. Still on vapotherm 40 l., + NRB  Saturations about the same today. No new complaints. Medications:  Reviewed    Infusion Medications    sodium chloride      dextrose       Scheduled Medications    polyethylene glycol  17 g Oral Daily    budesonide-formoterol  2 puff Inhalation BID    enoxaparin  40 mg Subcutaneous BID    dexamethasone  10 mg Intravenous Daily    amLODIPine  5 mg Oral Daily    lisinopril  30 mg Oral Daily    metoprolol tartrate  100 mg Oral BID    sodium chloride flush  10 mL Intravenous 2 times per day    aspirin  81 mg Oral Daily    levothyroxine  75 mcg Oral Daily    atorvastatin  40 mg Oral Daily    insulin lispro  0-6 Units Subcutaneous TID WC    insulin lispro  0-3 Units Subcutaneous Nightly    albuterol sulfate HFA  2 puff Inhalation 4x daily    And    ipratropium  2 puff Inhalation 4x daily    Vitamin D  2,000 Units Oral Daily    anastrozole  1 mg Oral Daily    DULoxetine  60 mg Oral Daily     PRN Meds: sodium chloride, sodium chloride flush, acetaminophen **OR** acetaminophen, glucose, dextrose, glucagon (rDNA), dextrose, ondansetron, guaiFENesin-dextromethorphan, sodium chloride      Intake/Output Summary (Last 24 hours) at 1/7/2021 1235  Last data filed at 1/7/2021 1130  Gross per 24 hour   Intake 480 ml   Output 675 ml   Net -195 ml       Physical Exam Performed:    /72   Pulse 70   Temp 98.1 °F (36.7 °C) (Axillary)   Resp 18   Ht 5' 6\" (1.676 m)   Wt 189 lb 9.5 oz (86 kg)   SpO2 92%   BMI 30.60 kg/m²     General appearance: No apparent distress, alert and cooperative. HEENT: Conjunctivae/corneas clear, neck supple w/ full ROM  Respiratory:  Normal respiratory effort.  Faint bilateral rales  Cardiovascular: Regular rate and rhythm, normal S1/S2, no murmurs  Abdomen: Soft, non-tender, non-distended with normal bowel sounds. Musculoskeletal: No edema bilaterally  Neurologic:  No new focal deficits  Psychiatric: Alert and oriented, normal insight  Capillary Refill: Brisk,< 3 seconds   Peripheral Pulses: +2 palpable, equal bilaterally       Labs:   Recent Labs     01/05/21  1006 01/06/21  0532 01/07/21  0646   WBC 14.2* 11.6* 13.1*   HGB 12.8 13.2 12.1   HCT 39.0 40.2 38.4    386 403     Recent Labs     01/05/21  1006 01/06/21  0532 01/07/21  0646    138 142   K 3.8 4.0 4.6    101 103   CO2 23 26 32   BUN 27* 25* 25*   CREATININE 0.6 0.6 0.8   CALCIUM 8.5 8.6 8.8     Recent Labs     01/05/21  1006 01/06/21  0532 01/07/21  0646   AST 20 21 16   ALT 22 20 17   BILITOT 0.4 0.4 0.3   ALKPHOS 71 78 72     Recent Labs     01/05/21  1006 01/06/21  0532 01/07/21  0646   INR 1.08 1.05 1.06     No results for input(s): Dayron Robert in the last 72 hours.     Urinalysis:    No results found for: Lamont Alegria, [address], [address] [address], [address] [place] [address], [facility] [address]    Radiology:  XR CHEST PORTABLE   Final Result   Bilateral ill-defined airspace opacities could represent atypical pneumonia,   multifocal bacterial pneumonia or subsegmental atelectasis                 Assessment/Plan:    Active Hospital Problems    Diagnosis    Pneumonia due to COVID-19 virus [U07.1, J12.82]    Hyperglycemia [R73.9]    Leukocytosis [D72.829]    Acute respiratory failure with hypoxia ([facility] [address] [J96.01]    2019 novel coronavirus-infected pneumonia ([facility]) [U07.1, J12.82]    Elevated LDH [R74.02]    Elevated AST (SGOT) [R74.01]    Elevated ferritin [R79.89]    Elevated d-dimer [R79.89]    History of depression [Z86.59]    Essential hypertension [I10]    Class 2 obesity due to excess calories with body mass index (BMI) of 35.0 to 35.9 in adult [E66.09, Z68.35]    Suspected COVID-19 virus infection [Z20.822]    Hypothyroidism [E03.9]    HLD (hyperlipidemia) [E78.5]    PCOS (polycystic ovarian syndrome) [E28.2]    Pneumonia due to organism [J18.9]     COVID 19 PNA  - c/w decadron  Increased to 20 mg x 5 days then 10 mg x 5 days  - completed remdesivir day 5/5  - s/p convalescent plasma 12/30/2020  - monitor inflammatory markers  - added Symbicort and continue albuterol/ipatropium  - PLEASE AVOID NARCOTICS    Acute respiratory failure with hypoxia -   - worsening, now on vapotherm + NRB  - consulted pulmonology     HTN - elevated  - c/w bb, acei  - resume home norvasc    Hx breast cancer  - c/w arimidex    HLD  - c/w statin    Hypothroidism  - c/w levothyroxine     DMT2  - hold metformin  - ssi, monitor on steroids  - a1c - 6.0    Depression  - c/w home meds    DVT Prophylaxis: lovenox bid  Diet: DIET LOW SODIUM 2 GM; Carb Control: 4 carb choices (60 gms)/meal  Code Status: Full Code      Dispo - continue care. Prognosis guarded.      Heidi Ling MD

## 2021-01-07 NOTE — PROGRESS NOTES
Occupational Therapy   Occupational Therapy Initial Assessment  Date: 2021   Patient Name: Veronica Rosa  MRN: [id number]     : 1955    Date of Service: 2021    Discharge Recommendations:  Home with assist PRN, Continue to assess pending progress  OT Equipment Recommendations  Equipment Needed: No  Veronica Rosa scored a 21/24 on the AM-PAC ADL Inpatient form. At this time, no further OT is recommended upon discharge due to anticipated return to baseline function. Recommend patient returns to prior setting with prior services. Assessment   Performance deficits / Impairments: Decreased endurance;Decreased ADL status  Assessment: Pt is a 64 yo F admitted with COVID-19. PMHx: arthritis, breast ca, depression, HTN, obesity, scoliosis, thyroid disease. PTA, pt lives with her son, reports she was completely independent in self-care and fxl mobility, working PT, driving. Pt currently below baseline secondary to the above deficits, requiring 40L O2 via vapotherm + NRB. Despite this, pt was able to complete multiple transfers with SBA, and tolerated static standing x2 mins SBA. Pt's O2 sats briefly dropped to 89%, otherwise remained WFL. Anticipate that pt would require overall SBA for ADLs. WIll cont to see on acute in order to address the above deficits and maximize pt's functional performance. Pt's discharge plans will be dependent on medical/respiratory status, though anticipate she will be functionally able to return home upon d/c. Do not anticipate need for further OT upon d/c, though will cont to assess.   Prognosis: Fair  Decision Making: Medium Complexity  History: see above  Exam: fxl transfers, fxl mob, ADLs, ROM/MMT  Assistance / Modification: SBA  OT Education: OT Role;Plan of Care;Energy Conservation;Transfer Training  Patient Education: need to change positions to reduce risk of skin breakdown  REQUIRES OT FOLLOW UP: Yes  Activity Tolerance  Activity Tolerance: Patient Tolerated treatment well  Activity Tolerance: pt tolerated tx well despite high levels of supplemental O2, O2 sats briefly dropped to 89% but recovered quickly  Safety Devices  Safety Devices in place: Yes  Type of devices: Call light within reach; Left in chair;Nurse notified           Patient Diagnosis(es): The primary encounter diagnosis was Pneumonia due to organism. A diagnosis of COVID-19 virus test result unknown was also pertinent to this visit. has a past medical history of Arthritis, Breast cancer ([address] Depression, Hypertension, Obesity, Peptic ulcer disease, Scoliosis, Skin cancer, and Thyroid disease. has a past surgical history that includes Breast biopsy; Breast lumpectomy; Hysterectomy; Ovary removal; back surgery; Dilation and curettage of uterus; Carpal tunnel release; and Thyroid surgery. Restrictions  Restrictions/Precautions  Restrictions/Precautions: Isolation  Position Activity Restriction  Other position/activity restrictions: Droplet Plus; 40L vapotherm + NRB    Subjective   General  Chart Reviewed: Yes  Patient assessed for rehabilitation services?: Yes  Additional Pertinent Hx: per H&P: \"The patient is a 63 y.o. female with past medical history of breast cancer currently on treatment with anastrozole, hypertension, polycystic ovarian syndrome using Metformin and not diabetic, hypothyroidism, depression who presents to [facility] from neighboring ED for 2 to 3-day history of above symptoms of nonproductive cough with concurrent sore throat and right ear fullness as well as just feeling off in terms of energy for the past few days. Patient overall remarks that she has not felt acutely in any respiratory distress but has felt somewhat more tired with exertion and possibly short of breath from that point of view. Patient otherwise denies any other symptoms of fever, chills, nausea/vomiting/diarrhea, dysuria, rashes, chest pain, dizziness, syncope.   She has noted some lightheadedness in addition to the sore throat and cough as well as this fatigue with exertion and possible shortness of breath. She denies any smoking or any other use of drugs, she also denies any sick contacts of note according to her. Currently in the ED she was found to be 88% on room air and was started on oxygen therapy but was eventually needing higher levels to about 6 L of nasal cannula oxygen. \"  Family / Caregiver Present: No  Referring Practitioner: Fasial Arshad MD  Diagnosis: COVID-19  Subjective  Subjective: pt met b/s for OT eval/tx. pt in recliner, agreeable to therapy. pt denied pain  General Comment  Comments: RN cleared pt for therapy.  RN reports pt is always up in the chair and buttocks is beginning to becoming excoriated  Vital Signs  Resp: 18  Oxygen Therapy  SpO2: 92 %  Pulse Oximeter Device Mode: Continuous  Pulse Oximeter Device Location: Finger  O2 Device: High flow nasal cannula(+nrb)  Skin Assessment: Clean, dry, & intact  FiO2 : 100 %  O2 Flow Rate (L/min): 40 L/min  Social/Functional History  Social/Functional History  Lives With: Son(son, works 3pm-11pm)  Type of Home: House  Home Layout: One level  Home Access: Stairs to enter without rails  Entrance Stairs - Number of Steps: 1 step up from garage  Bathroom Shower/Tub: Walk-in shower  Bathroom Toilet: Standard  Home Equipment: Cane  ADL Assistance: Independent  Homemaking Assistance: Independent  Ambulation Assistance: Independent(typically without device, though uses SPC prn)  Transfer Assistance: Independent  Active : Yes  Occupation: Part time employment  Type of occupation: various jobs, cleaning business       Objective   Vision: Within Functional Limits  Hearing: Within functional limits    Orientation  Overall Orientation Status: Within Functional Limits     Balance  Sitting Balance: Supervision  Standing Balance: Stand by assistance  Standing Balance  Time: x2 mins  Activity: static standing  Comment: O2 sats briefly dropped to 89% after 2 mins on 40L + NRB, recovered quickly once seated. notified RN  Functional Mobility  Functional - Mobility Device: No device  Activity: Other  Assist Level: Stand by assistance  Functional Mobility Comments: pt took steps forward/back from recliner; transferred <> [facility]; practiced static standing; demo'd fairly good activity tolerance despite high levels of O2  Toilet Transfers  Toilet - Technique: Stand pivot  Equipment Used: Standard bedside commode  Toilet Transfer: Stand by assistance  Toilet Transfers Comments: pt transferred from bed <> [facility] SBA, demo'd good safety awareness with multiple lines  ADL  LE Dressing: Stand by assistance(pt able to doff/don socks sitting in recliner)  Additional Comments: pt declined ADLs.  anticipate she would require setup for feeding & grooming, SBA for bathing/dressing, SBA for toileting based on ROM, strength, endurance this session  Tone RUE  RUE Tone: Normotonic  Tone LUE  LUE Tone: Normotonic  Coordination  Movements Are Fluid And Coordinated: Yes        Transfers  Sit to stand: Stand by assistance  Stand to sit: Stand by assistance     Cognition  Overall Cognitive Status: WFL                 LUE AROM (degrees)  LUE AROM : WFL  Left Hand AROM (degrees)  Left Hand AROM: WFL  RUE AROM (degrees)  RUE AROM : WFL  Right Hand AROM (degrees)  Right Hand AROM: WFL  LUE Strength  Gross LUE Strength: WFL  RUE Strength  Gross RUE Strength: WFL                   Plan   Plan  Times per week: 3-5  Times per day: Daily  Current Treatment Recommendations: Strengthening, Endurance Training, Patient/Caregiver Education & Training, Equipment Evaluation, Education, & procurement, ROM, Balance Training, Functional Mobility Training, Safety Education & Training, Self-Care / ADL               AM-PAC Score        AM-PAC Inpatient Daily Activity Raw Score: 21 (01/07/21 1329)  AM-PAC Inpatient ADL T-Scale Score : 44.27 (01/07/21 1329)  ADL Inpatient CMS 0-100% Score: 32.79

## 2021-01-08 LAB
A/G RATIO: 0.7 (ref 1.1–2.2)
ALBUMIN SERPL-MCNC: 2.3 G/DL (ref 3.4–5)
ALP BLD-CCNC: 74 U/L (ref 40–129)
ALT SERPL-CCNC: 19 U/L (ref 10–40)
ANION GAP SERPL CALCULATED.3IONS-SCNC: 8 MMOL/L (ref 3–16)
APTT: 26.8 SEC (ref 24.2–36.2)
AST SERPL-CCNC: 22 U/L (ref 15–37)
BASOPHILS ABSOLUTE: 0 K/UL (ref 0–0.2)
BASOPHILS RELATIVE PERCENT: 0.1 %
BILIRUB SERPL-MCNC: 0.3 MG/DL (ref 0–1)
BUN BLDV-MCNC: 31 MG/DL (ref 7–20)
C-REACTIVE PROTEIN: 37.4 MG/L (ref 0–5.1)
CALCIUM SERPL-MCNC: 9 MG/DL (ref 8.3–10.6)
CHLORIDE BLD-SCNC: 104 MMOL/L (ref 99–110)
CO2: 28 MMOL/L (ref 21–32)
CREAT SERPL-MCNC: 0.7 MG/DL (ref 0.6–1.2)
D DIMER: 4060 NG/ML DDU (ref 0–229)
EOSINOPHILS ABSOLUTE: 0 K/UL (ref 0–0.6)
EOSINOPHILS RELATIVE PERCENT: 0.1 %
FIBRINOGEN: 511 MG/DL (ref 200–397)
GFR AFRICAN AMERICAN: >60
GFR NON-AFRICAN AMERICAN: >60
GLOBULIN: 3.3 G/DL
GLUCOSE BLD-MCNC: 107 MG/DL (ref 70–99)
GLUCOSE BLD-MCNC: 138 MG/DL (ref 70–99)
GLUCOSE BLD-MCNC: 164 MG/DL (ref 70–99)
GLUCOSE BLD-MCNC: 170 MG/DL (ref 70–99)
GLUCOSE BLD-MCNC: 85 MG/DL (ref 70–99)
HCT VFR BLD CALC: 41.2 % (ref 36–48)
HEMOGLOBIN: 13.1 G/DL (ref 12–16)
INR BLD: 1.04 (ref 0.86–1.14)
LYMPHOCYTES ABSOLUTE: 0.6 K/UL (ref 1–5.1)
LYMPHOCYTES RELATIVE PERCENT: 2.5 %
MCH RBC QN AUTO: 29.6 PG (ref 26–34)
MCHC RBC AUTO-ENTMCNC: 31.9 G/DL (ref 31–36)
MCV RBC AUTO: 92.8 FL (ref 80–100)
MONOCYTES ABSOLUTE: 1.1 K/UL (ref 0–1.3)
MONOCYTES RELATIVE PERCENT: 4.6 %
NEUTROPHILS ABSOLUTE: 21.3 K/UL (ref 1.7–7.7)
NEUTROPHILS RELATIVE PERCENT: 92.7 %
PDW BLD-RTO: 14.5 % (ref 12.4–15.4)
PERFORMED ON: ABNORMAL
PERFORMED ON: NORMAL
PLATELET # BLD: 420 K/UL (ref 135–450)
PMV BLD AUTO: 8.9 FL (ref 5–10.5)
POTASSIUM REFLEX MAGNESIUM: 4.8 MMOL/L (ref 3.5–5.1)
PROCALCITONIN: 0.06 NG/ML (ref 0–0.15)
PROTHROMBIN TIME: 12.1 SEC (ref 10–13.2)
RBC # BLD: 4.44 M/UL (ref 4–5.2)
REASON FOR REJECTION: NORMAL
REJECTED TEST: NORMAL
SODIUM BLD-SCNC: 140 MMOL/L (ref 136–145)
TOTAL PROTEIN: 5.6 G/DL (ref 6.4–8.2)
WBC # BLD: 22.9 K/UL (ref 4–11)

## 2021-01-08 PROCEDURE — 6360000002 HC RX W HCPCS: Performed by: INTERNAL MEDICINE

## 2021-01-08 PROCEDURE — 97110 THERAPEUTIC EXERCISES: CPT

## 2021-01-08 PROCEDURE — 85384 FIBRINOGEN ACTIVITY: CPT

## 2021-01-08 PROCEDURE — 6370000000 HC RX 637 (ALT 250 FOR IP): Performed by: STUDENT IN AN ORGANIZED HEALTH CARE EDUCATION/TRAINING PROGRAM

## 2021-01-08 PROCEDURE — 94761 N-INVAS EAR/PLS OXIMETRY MLT: CPT

## 2021-01-08 PROCEDURE — 85730 THROMBOPLASTIN TIME PARTIAL: CPT

## 2021-01-08 PROCEDURE — 86140 C-REACTIVE PROTEIN: CPT

## 2021-01-08 PROCEDURE — 85610 PROTHROMBIN TIME: CPT

## 2021-01-08 PROCEDURE — 99232 SBSQ HOSP IP/OBS MODERATE 35: CPT | Performed by: INTERNAL MEDICINE

## 2021-01-08 PROCEDURE — 36415 COLL VENOUS BLD VENIPUNCTURE: CPT

## 2021-01-08 PROCEDURE — 6370000000 HC RX 637 (ALT 250 FOR IP): Performed by: FAMILY MEDICINE

## 2021-01-08 PROCEDURE — 85379 FIBRIN DEGRADATION QUANT: CPT

## 2021-01-08 PROCEDURE — 84145 PROCALCITONIN (PCT): CPT

## 2021-01-08 PROCEDURE — 85025 COMPLETE CBC W/AUTO DIFF WBC: CPT

## 2021-01-08 PROCEDURE — 94640 AIRWAY INHALATION TREATMENT: CPT

## 2021-01-08 PROCEDURE — 2580000003 HC RX 258: Performed by: STUDENT IN AN ORGANIZED HEALTH CARE EDUCATION/TRAINING PROGRAM

## 2021-01-08 PROCEDURE — 6370000000 HC RX 637 (ALT 250 FOR IP): Performed by: INTERNAL MEDICINE

## 2021-01-08 PROCEDURE — 2700000000 HC OXYGEN THERAPY PER DAY

## 2021-01-08 PROCEDURE — 80053 COMPREHEN METABOLIC PANEL: CPT

## 2021-01-08 PROCEDURE — 2060000000 HC ICU INTERMEDIATE R&B

## 2021-01-08 RX ADMIN — Medication 2000 UNITS: at 07:52

## 2021-01-08 RX ADMIN — Medication 2 PUFF: at 07:50

## 2021-01-08 RX ADMIN — GUAIFENESIN AND DEXTROMETHORPHAN 5 ML: 100; 10 SYRUP ORAL at 07:52

## 2021-01-08 RX ADMIN — ACETAMINOPHEN 650 MG: 325 TABLET ORAL at 14:57

## 2021-01-08 RX ADMIN — Medication 2 PUFF: at 19:47

## 2021-01-08 RX ADMIN — IPRATROPIUM BROMIDE 2 PUFF: 17 AEROSOL, METERED RESPIRATORY (INHALATION) at 16:43

## 2021-01-08 RX ADMIN — ENOXAPARIN SODIUM 40 MG: 40 INJECTION SUBCUTANEOUS at 07:53

## 2021-01-08 RX ADMIN — ENOXAPARIN SODIUM 40 MG: 40 INJECTION SUBCUTANEOUS at 21:08

## 2021-01-08 RX ADMIN — DULOXETINE HYDROCHLORIDE 60 MG: 60 CAPSULE, DELAYED RELEASE ORAL at 07:52

## 2021-01-08 RX ADMIN — ALBUTEROL SULFATE 2 PUFF: 90 AEROSOL, METERED RESPIRATORY (INHALATION) at 19:47

## 2021-01-08 RX ADMIN — ALBUTEROL SULFATE 2 PUFF: 90 AEROSOL, METERED RESPIRATORY (INHALATION) at 12:08

## 2021-01-08 RX ADMIN — ACETAMINOPHEN 650 MG: 325 TABLET ORAL at 07:52

## 2021-01-08 RX ADMIN — SODIUM CHLORIDE, PRESERVATIVE FREE 10 ML: 5 INJECTION INTRAVENOUS at 07:54

## 2021-01-08 RX ADMIN — ALBUTEROL SULFATE 2 PUFF: 90 AEROSOL, METERED RESPIRATORY (INHALATION) at 16:43

## 2021-01-08 RX ADMIN — LEVOTHYROXINE SODIUM 75 MCG: 0.07 TABLET ORAL at 06:24

## 2021-01-08 RX ADMIN — AMLODIPINE BESYLATE 5 MG: 5 TABLET ORAL at 07:52

## 2021-01-08 RX ADMIN — POLYETHYLENE GLYCOL 3350 17 G: 17 POWDER, FOR SOLUTION ORAL at 07:52

## 2021-01-08 RX ADMIN — GUAIFENESIN AND DEXTROMETHORPHAN 5 ML: 100; 10 SYRUP ORAL at 21:08

## 2021-01-08 RX ADMIN — INSULIN LISPRO 1 UNITS: 100 INJECTION, SOLUTION INTRAVENOUS; SUBCUTANEOUS at 21:07

## 2021-01-08 RX ADMIN — METOPROLOL TARTRATE 100 MG: 50 TABLET, FILM COATED ORAL at 21:07

## 2021-01-08 RX ADMIN — ACETAMINOPHEN 650 MG: 325 TABLET ORAL at 21:07

## 2021-01-08 RX ADMIN — METOPROLOL TARTRATE 100 MG: 50 TABLET, FILM COATED ORAL at 07:52

## 2021-01-08 RX ADMIN — IPRATROPIUM BROMIDE 2 PUFF: 17 AEROSOL, METERED RESPIRATORY (INHALATION) at 07:50

## 2021-01-08 RX ADMIN — ATORVASTATIN CALCIUM 40 MG: 40 TABLET, FILM COATED ORAL at 07:52

## 2021-01-08 RX ADMIN — IPRATROPIUM BROMIDE 2 PUFF: 17 AEROSOL, METERED RESPIRATORY (INHALATION) at 12:08

## 2021-01-08 RX ADMIN — ALBUTEROL SULFATE 2 PUFF: 90 AEROSOL, METERED RESPIRATORY (INHALATION) at 07:50

## 2021-01-08 RX ADMIN — DEXAMETHASONE SODIUM PHOSPHATE 10 MG: 10 INJECTION, SOLUTION INTRAMUSCULAR; INTRAVENOUS at 07:52

## 2021-01-08 RX ADMIN — ANASTROZOLE 1 MG: 1 TABLET ORAL at 09:04

## 2021-01-08 RX ADMIN — GUAIFENESIN AND DEXTROMETHORPHAN 5 ML: 100; 10 SYRUP ORAL at 14:57

## 2021-01-08 RX ADMIN — IPRATROPIUM BROMIDE 2 PUFF: 17 AEROSOL, METERED RESPIRATORY (INHALATION) at 19:47

## 2021-01-08 RX ADMIN — SODIUM CHLORIDE, PRESERVATIVE FREE 10 ML: 5 INJECTION INTRAVENOUS at 21:47

## 2021-01-08 RX ADMIN — ASPIRIN 81 MG 81 MG: 81 TABLET ORAL at 07:53

## 2021-01-08 RX ADMIN — LISINOPRIL 30 MG: 10 TABLET ORAL at 07:52

## 2021-01-08 ASSESSMENT — PAIN DESCRIPTION - ORIENTATION: ORIENTATION: MID

## 2021-01-08 ASSESSMENT — PAIN SCALES - GENERAL
PAINLEVEL_OUTOF10: 1
PAINLEVEL_OUTOF10: 0
PAINLEVEL_OUTOF10: 3
PAINLEVEL_OUTOF10: 0
PAINLEVEL_OUTOF10: 2
PAINLEVEL_OUTOF10: 4
PAINLEVEL_OUTOF10: 0

## 2021-01-08 ASSESSMENT — PAIN DESCRIPTION - PAIN TYPE: TYPE: ACUTE PAIN

## 2021-01-08 ASSESSMENT — PAIN DESCRIPTION - LOCATION
LOCATION: ABDOMEN;CHEST
LOCATION: HEAD

## 2021-01-08 ASSESSMENT — PAIN DESCRIPTION - PROGRESSION
CLINICAL_PROGRESSION: NOT CHANGED
CLINICAL_PROGRESSION: NOT CHANGED

## 2021-01-08 ASSESSMENT — PAIN - FUNCTIONAL ASSESSMENT
PAIN_FUNCTIONAL_ASSESSMENT: ACTIVITIES ARE NOT PREVENTED
PAIN_FUNCTIONAL_ASSESSMENT: ACTIVITIES ARE NOT PREVENTED

## 2021-01-08 ASSESSMENT — PAIN DESCRIPTION - DESCRIPTORS: DESCRIPTORS: HEADACHE

## 2021-01-08 ASSESSMENT — PAIN DESCRIPTION - ONSET
ONSET: ON-GOING
ONSET: ON-GOING

## 2021-01-08 NOTE — PLAN OF CARE
physical injury  Description: Absence of physical injury  Outcome: Ongoing     Problem: Nutrition  Goal: Optimal nutrition therapy  1/8/2021 0007 by Mark Liu RN  Outcome: Ongoing  1/7/2021 1118 by Angel Omalley RD, LD  Outcome: Ongoing

## 2021-01-08 NOTE — PROGRESS NOTES
Occupational Therapy  Facility/Department: [address] PROGRESSIVE CARE  Daily Treatment Note  NAME: Ulisses Ramírez  : 1955  MRN: [id number]    Date of Service: 2021    Discharge Recommendations:  Home with assist PRN, Continue to assess pending progress     Uilsses Ramírez scored a 21/24 on the AM-PAC ADL Inpatient form. At this time, no further OT is recommended upon discharge due to anticipate return to baseline, though will continue to assess medical progression. Recommend patient returns to prior setting with prior services. Assessment   Performance deficits / Impairments: Decreased endurance;Decreased ADL status  Assessment: Pt tolerated short tx session well, down to 40L vapotherm without use of NRB this date. Pt completed B UE HEP, x10 reps of 7 exercises and tolerated well with rest breaks prn. Pt briefly desatted to 84% though recovered well. Continue to anticipate that pt will be able to return home when medically stable, pending medical/respiratory status. Cont per POC  OT Education: OT Role;Plan of Care;Energy Conservation;Home Exercise Program  REQUIRES OT FOLLOW UP: Yes  Activity Tolerance  Activity Tolerance: Patient Tolerated treatment well  Activity Tolerance: pt did not require use of NRB this session. pt briefly desatted to 84% while completing UE therex, though recovered quickly on 40L vapotherm  Safety Devices  Safety Devices in place: Yes  Type of devices: Call light within reach; Left in chair         Patient Diagnosis(es): The primary encounter diagnosis was Pneumonia due to organism. A diagnosis of COVID-19 virus test result unknown was also pertinent to this visit. has a past medical history of Arthritis, Breast cancer ([address] Depression, Hypertension, Obesity, Peptic ulcer disease, Scoliosis, Skin cancer, and Thyroid disease. has a past surgical history that includes Breast biopsy; Breast lumpectomy; Hysterectomy;  Ovary removal; back surgery; Dilation and curettage of uterus; Carpal tunnel release; and Thyroid surgery. Restrictions  Restrictions/Precautions  Restrictions/Precautions: Isolation  Position Activity Restriction  Other position/activity restrictions: Droplet Plus :  O2 40L via Vapotherm  Subjective   General  Chart Reviewed: Yes  Patient assessed for rehabilitation services?: Yes  Additional Pertinent Hx: per H&P: \"The patient is a 63 y.o. female with past medical history of breast cancer currently on treatment with anastrozole, hypertension, polycystic ovarian syndrome using Metformin and not diabetic, hypothyroidism, depression who presents to [facility] from neighboring ED for 2 to 3-day history of above symptoms of nonproductive cough with concurrent sore throat and right ear fullness as well as just feeling off in terms of energy for the past few days. Patient overall remarks that she has not felt acutely in any respiratory distress but has felt somewhat more tired with exertion and possibly short of breath from that point of view. Patient otherwise denies any other symptoms of fever, chills, nausea/vomiting/diarrhea, dysuria, rashes, chest pain, dizziness, syncope. She has noted some lightheadedness in addition to the sore throat and cough as well as this fatigue with exertion and possible shortness of breath. She denies any smoking or any other use of drugs, she also denies any sick contacts of note according to her. Currently in the ED she was found to be 88% on room air and was started on oxygen therapy but was eventually needing higher levels to about 6 L of nasal cannula oxygen. \"  Family / Caregiver Present: No  Referring Practitioner: Benson Mooney MD  Diagnosis: COVID-19  Subjective  Subjective: pt met b/s for OT tx. pt in recliner, agreeable to therapy, denied pain  General Comment  Comments: RN cleared pt for therapy.  RN reports pt is always up in the chair and buttocks is beginning to becoming excoriated           Objective    Type of

## 2021-01-08 NOTE — PROGRESS NOTES
Hospitalist Progress Note      PCP: Alexandra Barnes MD    Date of Admission: 12/28/2020      Hospital Course: admitted with non productive cough and hypoxia, with COVID 19 PNA     Subjective:    Patient seen and examined. Still on vapotherm 40 l., off NRB  Saturations improved and able to come off the NRB  Tired    Medications:  Reviewed    Infusion Medications    sodium chloride      dextrose       Scheduled Medications    polyethylene glycol  17 g Oral Daily    budesonide-formoterol  2 puff Inhalation BID    enoxaparin  40 mg Subcutaneous BID    dexamethasone  10 mg Intravenous Daily    amLODIPine  5 mg Oral Daily    lisinopril  30 mg Oral Daily    metoprolol tartrate  100 mg Oral BID    sodium chloride flush  10 mL Intravenous 2 times per day    aspirin  81 mg Oral Daily    levothyroxine  75 mcg Oral Daily    atorvastatin  40 mg Oral Daily    insulin lispro  0-6 Units Subcutaneous TID WC    insulin lispro  0-3 Units Subcutaneous Nightly    albuterol sulfate HFA  2 puff Inhalation 4x daily    And    ipratropium  2 puff Inhalation 4x daily    Vitamin D  2,000 Units Oral Daily    anastrozole  1 mg Oral Daily    DULoxetine  60 mg Oral Daily     PRN Meds: sodium chloride, sodium chloride flush, acetaminophen **OR** acetaminophen, glucose, dextrose, glucagon (rDNA), dextrose, ondansetron, guaiFENesin-dextromethorphan, sodium chloride      Intake/Output Summary (Last 24 hours) at 1/8/2021 1624  Last data filed at 1/8/2021 0941  Gross per 24 hour   Intake 960 ml   Output 600 ml   Net 360 ml       Physical Exam Performed:    /67   Pulse 84   Temp 98.9 °F (37.2 °C) (Oral)   Resp 20   Ht 5' 6\" (1.676 m)   Wt 177 lb 11.1 oz (80.6 kg)   SpO2 93%   BMI 28.68 kg/m²     General appearance: No apparent distress, alert and cooperative. HEENT: Conjunctivae/corneas clear, neck supple w/ full ROM  Respiratory:  Normal respiratory effort.  Faint bilateral rales  Cardiovascular: Regular rate and rhythm, normal S1/S2, no murmurs  Abdomen: Soft, non-tender, non-distended with normal bowel sounds. Musculoskeletal: No edema bilaterally  Neurologic:  No new focal deficits  Psychiatric: Alert and oriented, normal insight  Capillary Refill: Brisk,< 3 seconds   Peripheral Pulses: +2 palpable, equal bilaterally       Labs:   Recent Labs     01/06/21  0532 01/07/21  0646 01/08/21  1000   WBC 11.6* 13.1* 22.9*   HGB 13.2 12.1 13.1   HCT 40.2 38.4 41.2    403 420     Recent Labs     01/06/21  0532 01/07/21  0646 01/08/21  1327    142 140   K 4.0 4.6 4.8    103 104   CO2 26 32 28   BUN 25* 25* 31*   CREATININE 0.6 0.8 0.7   CALCIUM 8.6 8.8 9.0     Recent Labs     01/06/21  0532 01/07/21  0646 01/08/21  1327   AST 21 16 22   ALT 20 17 19   BILITOT 0.4 0.3 0.3   ALKPHOS 78 72 74     Recent Labs     01/06/21  0532 01/07/21  0646 01/08/21  1000   INR 1.05 1.06 1.04     No results for input(s): Padgett Robert in the last 72 hours.     Urinalysis:    No results found for: Lamont Alegria, [address], [address] [facility] [address], [address] [place] [address], [facility] [address]    Radiology:  XR CHEST PORTABLE   Final Result   Bilateral ill-defined airspace opacities could represent atypical pneumonia,   multifocal bacterial pneumonia or subsegmental atelectasis                 Assessment/Plan:    Active Hospital Problems    Diagnosis    Pneumonia due to COVID-19 virus [U07.1, J12.82]    Hyperglycemia [R73.9]    Leukocytosis [D72.829]    Acute respiratory failure with hypoxia ([facility] [J96.01]    2019 novel coronavirus-infected pneumonia (NCIP) [U07.1, J12.82]    Elevated LDH [R74.02]    Elevated AST (SGOT) [R74.01]    Elevated ferritin [R79.89]    Elevated d-dimer [R79.89]    History of depression [Z86.59]    Essential hypertension [I10]    Class 2 obesity due to excess calories with body mass index (BMI) of 35.0 to 35.9 in adult [E66.09, Z68.35]    Suspected COVID-19 virus infection [Z20.822]    Hypothyroidism [E03.9]    HLD (hyperlipidemia) [E78.5]    PCOS (polycystic ovarian syndrome) [E28.2]    Pneumonia due to organism [J18.9]     COVID 19 PNA  - c/w decadron  Increased to 20 mg x 5 days then 10 mg x 5 days  - completed remdesivir day 5/5  - s/p convalescent plasma 12/30/2020  - monitor inflammatory markers  - added Symbicort and continue albuterol/ipatropium  - PLEASE AVOID NARCOTICS    Acute respiratory failure with hypoxia -   - improved, now on vapotherm only   - consulted pulmonology     HTN - elevated  - c/w bb, acei  - resume home norvasc    Hx breast cancer  - c/w arimidex    HLD  - c/w statin    Hypothroidism  - c/w levothyroxine     DMT2  - hold metformin  - ssi, monitor on steroids  - a1c - 6.0    Depression  - c/w home meds    DVT Prophylaxis: lovenox bid  Diet: DIET LOW SODIUM 2 GM; Carb Control: 4 carb choices (60 gms)/meal  Code Status: Full Code      Dispo - continue care. Prognosis guarded but may be clinically improving slightly.     Henrietta France MD

## 2021-01-08 NOTE — PROGRESS NOTES
Physical Therapy  Facility/Department: [address] PROGRESSIVE CARE  Daily Treatment Note  NAME: Arnie Magaña  : 1955  MRN: [id number]    Date of Service: 2021    Discharge Recommendations:  Continue to assess pending progress        Assessment   Body structures, Functions, Activity limitations: Decreased functional mobility ; Decreased endurance  Assessment: Pt tolerated multiple rounds of standing exercise this morning. SPO2 initially decreased to high 80s with activity, but then maintained in 90s throughout remainder of session. She would benefit from continued therapy to continue to progress her activity tolerance. Will continue to assess for D/C plan pending pt progress. Arnie Magaña scored a  on the AM-PAC short mobility form. See above for discharge recommendations. If patient discharges prior to next session this note will serve as a discharge summary. Please see below for the latest assessment towards goals. PT Education: Goals; General Safety;PT Role;Orientation;Plan of Care;Gait Training  Activity Tolerance  Activity Tolerance: Patient limited by endurance; Patient Tolerated treatment well     Patient Diagnosis(es): The primary encounter diagnosis was Pneumonia due to organism. A diagnosis of COVID-19 virus test result unknown was also pertinent to this visit. has a past medical history of Arthritis, Breast cancer ([address] Depression, Hypertension, Obesity, Peptic ulcer disease, Scoliosis, Skin cancer, and Thyroid disease. has a past surgical history that includes Breast biopsy; Breast lumpectomy; Hysterectomy; Ovary removal; back surgery; Dilation and curettage of uterus; Carpal tunnel release; and Thyroid surgery. Restrictions  Restrictions/Precautions  Restrictions/Precautions: Isolation  Position Activity Restriction  Other position/activity restrictions: Droplet Plus :  O2 40L via Vapotherm + NRB. Subjective   Subjective  Subjective: Pt agreeable to session.   Denies pain.  Mid 90s on 40 L. vapotherm. Orientation  Orientation  Overall Orientation Status: Within Functional Limits    Objective      Transfers  Sit to Stand: Contact guard assistance(Mild posterior lean, instability)  Stand to sit: Contact guard assistance     Exercises  Comments: Seated: Hip flex x 10, LAQ x 10. Other exercises  Other exercises?: Yes  Other exercises 1: Static stand without UE support x 1 min., SPO2 to high 80s. Seated rest after. Other exercises 2: Standing march unsupported 2 x 1 min., SPO2 in 90s throughout, intermittent Min A for balance. Other exercises 3: Standing: Mini-squat 2 x 10, single hand support. Other exercises 4: Sit < > stand from chair x 10, SBA, no LOB. Other Activities: Other (see comment)  Comment: Pt positioned for comfort in bedside chair at end of session, call light in reach, breakfast tray placed. AM-PAC Score  AM-PAC Inpatient Mobility Raw Score : 17 (01/08/21 0904)  AM-PAC Inpatient T-Scale Score : 42.13 (01/08/21 0904)  Mobility Inpatient CMS 0-100% Score: 50.57 (01/08/21 0904)  Mobility Inpatient CMS G-Code Modifier : CK (01/08/21 0904)          Goals  Short term goals  Time Frame for Short term goals: Upon d/c acute care setting. Short term goal 1: Transfers Independent. Short term goal 2: Amb with/without assist device 50-75' Independent, sats remain at least 88-90%. Patient Goals   Patient goals : Get better and go home. Plan    Plan  Times per week: 3-5 x week while in acute care setting. Current Treatment Recommendations: Functional Mobility Training, Endurance Training, Safety Education & Training, Patient/Caregiver Education & Training  Safety Devices  Type of devices:  All fall risk precautions in place, Call light within reach, Left in chair, Nurse notified, Gait belt  Restraints  Initially in place: No     Therapy Time   Individual Concurrent Group Co-treatment   Time In 0840         Time Out 0905         Minutes 25         Timed Code Treatment Minutes: 25 Minutes       Sunshine Bhakta, PT  Electronically signed by Sunshine Bhakta, PT [id number] on 1/8/2021 at 9:06 AM

## 2021-01-08 NOTE — PROGRESS NOTES
Pulmonary Progress Note    CC: Acute hypoxic respiratory failure  COVID-19 pneumonia  Hyperglycemia  Leukocytosis    24 hr events:  She remains on vapotherm at maximum settings. She has an intermittent cough. ROS:  No SOB  +cough  No vomiting       PHYSICAL EXAM:  Blood pressure 100/69, pulse 71, temperature 97.4 °F (36.3 °C), temperature source Axillary, resp. rate 18, height 5' 6\" (1.676 m), weight 177 lb 11.1 oz (80.6 kg), SpO2 94 %. On Vapotherm 40 L, 100%    Intake/Output Summary (Last 24 hours) at 1/8/2021 1426  Last data filed at 1/8/2021 0941  Gross per 24 hour   Intake 960 ml   Output 600 ml   Net 360 ml       Gen: Well developed; well nourished  Eyes: No scleral icterus. No conjunctival injection. ENT:  External appearance of ears and nose normal.  Neck: Trachea midline. No obvious mass. No visible thyroid enlargement    Respiratory: Crackles bilaterally, no accessory muscle use  Cardiovascular: Regular rate and rhythm, no appreciable murmurs. No edema. Skin: Warm and dry. No rashes or ulcers on visible areas. Normal texture and turgor  Musculoskeletal: No cyanosis, clubbing or joint deformity.     Psychiatric: Normal mood and affect; exhibits normal insight and judgement     Medications:  Current Facility-Administered Medications: polyethylene glycol (GLYCOLAX) packet 17 g, 17 g, Oral, Daily  budesonide-formoterol (SYMBICORT) 160-4.5 MCG/ACT inhaler 2 puff, 2 puff, Inhalation, BID  enoxaparin (LOVENOX) injection 40 mg, 40 mg, Subcutaneous, BID  dexamethasone (PF) (DECADRON) injection 10 mg, 10 mg, Intravenous, Daily  amLODIPine (NORVASC) tablet 5 mg, 5 mg, Oral, Daily  lisinopril (PRINIVIL;ZESTRIL) tablet 30 mg, 30 mg, Oral, Daily  metoprolol tartrate (LOPRESSOR) tablet 100 mg, 100 mg, Oral, BID  0.9 % sodium chloride infusion, , Intravenous, PRN  sodium chloride flush 0.9 % injection 10 mL, 10 mL, Intravenous, 2 times per day  sodium chloride flush 0.9 % injection 10 mL, 10 mL, Intravenous, 1. 06 1.04     APTT:   Recent Labs     01/06/21  0532 01/07/21  0646 01/08/21  1000   APTT 24.0* 28.1 26.8     Cultures:   Blood culture (12/28): Negative  Urine strep and Legionella antigens: Negative      Films:  Chest images and reports were reviewed by me. My interpretation is:  No new images    Assessment:     Acute hypoxic respiratory failure  COVID-19 pneumonia  Hyperglycemia  Leukocytosis      Plan:    Acute hypoxic respiratory failure  -Due to COVID-19 pneumonia  -On Vapotherm at maximum settings. Wean as able to keep saturation>90%    COVID-19 pneumonia  -Received 5 days of Decadron 20 mg daily. Now on Decadron 10 mg daily (day #3)  -Received a course of remdesivir  -Received convalescent plasma on 12/30/2020    Hyperglycemia  -Continue sliding scale insulin    Leukocytosis  -Likely due to steroids  -Check labs in a.m. Thank you for allowing me to participate in the care of this patient. Will follow.      Christian Tam MD  [facility]

## 2021-01-09 LAB
A/G RATIO: 0.9 (ref 1.1–2.2)
ALBUMIN SERPL-MCNC: 2.6 G/DL (ref 3.4–5)
ALP BLD-CCNC: 68 U/L (ref 40–129)
ALT SERPL-CCNC: 17 U/L (ref 10–40)
ANION GAP SERPL CALCULATED.3IONS-SCNC: 9 MMOL/L (ref 3–16)
APTT: 27.1 SEC (ref 24.2–36.2)
AST SERPL-CCNC: 15 U/L (ref 15–37)
BASOPHILS ABSOLUTE: 0 K/UL (ref 0–0.2)
BASOPHILS RELATIVE PERCENT: 0.1 %
BILIRUB SERPL-MCNC: 0.3 MG/DL (ref 0–1)
BUN BLDV-MCNC: 29 MG/DL (ref 7–20)
CALCIUM SERPL-MCNC: 8.8 MG/DL (ref 8.3–10.6)
CHLORIDE BLD-SCNC: 105 MMOL/L (ref 99–110)
CO2: 26 MMOL/L (ref 21–32)
CREAT SERPL-MCNC: 0.6 MG/DL (ref 0.6–1.2)
EOSINOPHILS ABSOLUTE: 0 K/UL (ref 0–0.6)
EOSINOPHILS RELATIVE PERCENT: 0.2 %
FIBRINOGEN: 511 MG/DL (ref 200–397)
GFR AFRICAN AMERICAN: >60
GFR NON-AFRICAN AMERICAN: >60
GLOBULIN: 2.9 G/DL
GLUCOSE BLD-MCNC: 138 MG/DL (ref 70–99)
GLUCOSE BLD-MCNC: 190 MG/DL (ref 70–99)
GLUCOSE BLD-MCNC: 94 MG/DL (ref 70–99)
GLUCOSE BLD-MCNC: 95 MG/DL (ref 70–99)
GLUCOSE BLD-MCNC: 99 MG/DL (ref 70–99)
HCT VFR BLD CALC: 38.8 % (ref 36–48)
HEMOGLOBIN: 12.9 G/DL (ref 12–16)
INR BLD: 1.02 (ref 0.86–1.14)
LYMPHOCYTES ABSOLUTE: 0.8 K/UL (ref 1–5.1)
LYMPHOCYTES RELATIVE PERCENT: 6.3 %
MCH RBC QN AUTO: 30.3 PG (ref 26–34)
MCHC RBC AUTO-ENTMCNC: 33.2 G/DL (ref 31–36)
MCV RBC AUTO: 91.4 FL (ref 80–100)
MONOCYTES ABSOLUTE: 1.1 K/UL (ref 0–1.3)
MONOCYTES RELATIVE PERCENT: 8.8 %
NEUTROPHILS ABSOLUTE: 10.2 K/UL (ref 1.7–7.7)
NEUTROPHILS RELATIVE PERCENT: 84.6 %
PDW BLD-RTO: 14.5 % (ref 12.4–15.4)
PERFORMED ON: ABNORMAL
PERFORMED ON: ABNORMAL
PERFORMED ON: NORMAL
PERFORMED ON: NORMAL
PLATELET # BLD: 355 K/UL (ref 135–450)
PMV BLD AUTO: 8.6 FL (ref 5–10.5)
POTASSIUM REFLEX MAGNESIUM: 4.1 MMOL/L (ref 3.5–5.1)
PROTHROMBIN TIME: 11.8 SEC (ref 10–13.2)
RBC # BLD: 4.25 M/UL (ref 4–5.2)
SODIUM BLD-SCNC: 140 MMOL/L (ref 136–145)
TOTAL PROTEIN: 5.5 G/DL (ref 6.4–8.2)
WBC # BLD: 12.1 K/UL (ref 4–11)

## 2021-01-09 PROCEDURE — 6370000000 HC RX 637 (ALT 250 FOR IP): Performed by: STUDENT IN AN ORGANIZED HEALTH CARE EDUCATION/TRAINING PROGRAM

## 2021-01-09 PROCEDURE — 6370000000 HC RX 637 (ALT 250 FOR IP): Performed by: INTERNAL MEDICINE

## 2021-01-09 PROCEDURE — 6370000000 HC RX 637 (ALT 250 FOR IP): Performed by: NURSE PRACTITIONER

## 2021-01-09 PROCEDURE — 36415 COLL VENOUS BLD VENIPUNCTURE: CPT

## 2021-01-09 PROCEDURE — 85384 FIBRINOGEN ACTIVITY: CPT

## 2021-01-09 PROCEDURE — 6360000002 HC RX W HCPCS: Performed by: INTERNAL MEDICINE

## 2021-01-09 PROCEDURE — 6370000000 HC RX 637 (ALT 250 FOR IP): Performed by: FAMILY MEDICINE

## 2021-01-09 PROCEDURE — 80053 COMPREHEN METABOLIC PANEL: CPT

## 2021-01-09 PROCEDURE — 85610 PROTHROMBIN TIME: CPT

## 2021-01-09 PROCEDURE — 6360000002 HC RX W HCPCS: Performed by: STUDENT IN AN ORGANIZED HEALTH CARE EDUCATION/TRAINING PROGRAM

## 2021-01-09 PROCEDURE — 85730 THROMBOPLASTIN TIME PARTIAL: CPT

## 2021-01-09 PROCEDURE — 2060000000 HC ICU INTERMEDIATE R&B

## 2021-01-09 PROCEDURE — 2700000000 HC OXYGEN THERAPY PER DAY

## 2021-01-09 PROCEDURE — 85025 COMPLETE CBC W/AUTO DIFF WBC: CPT

## 2021-01-09 PROCEDURE — 2580000003 HC RX 258: Performed by: STUDENT IN AN ORGANIZED HEALTH CARE EDUCATION/TRAINING PROGRAM

## 2021-01-09 PROCEDURE — 94761 N-INVAS EAR/PLS OXIMETRY MLT: CPT

## 2021-01-09 PROCEDURE — 94640 AIRWAY INHALATION TREATMENT: CPT

## 2021-01-09 RX ORDER — BISACODYL 10 MG
10 SUPPOSITORY, RECTAL RECTAL ONCE
Status: COMPLETED | OUTPATIENT
Start: 2021-01-09 | End: 2021-01-09

## 2021-01-09 RX ORDER — SENNA PLUS 8.6 MG/1
1 TABLET ORAL NIGHTLY
Status: DISCONTINUED | OUTPATIENT
Start: 2021-01-09 | End: 2021-01-23 | Stop reason: HOSPADM

## 2021-01-09 RX ORDER — METOPROLOL TARTRATE 50 MG/1
50 TABLET, FILM COATED ORAL ONCE
Status: COMPLETED | OUTPATIENT
Start: 2021-01-09 | End: 2021-01-09

## 2021-01-09 RX ADMIN — INSULIN LISPRO 1 UNITS: 100 INJECTION, SOLUTION INTRAVENOUS; SUBCUTANEOUS at 21:21

## 2021-01-09 RX ADMIN — ONDANSETRON 4 MG: 2 INJECTION INTRAMUSCULAR; INTRAVENOUS at 09:28

## 2021-01-09 RX ADMIN — AMLODIPINE BESYLATE 5 MG: 5 TABLET ORAL at 09:13

## 2021-01-09 RX ADMIN — ACETAMINOPHEN 650 MG: 325 TABLET ORAL at 18:38

## 2021-01-09 RX ADMIN — ALBUTEROL SULFATE 2 PUFF: 90 AEROSOL, METERED RESPIRATORY (INHALATION) at 12:30

## 2021-01-09 RX ADMIN — SODIUM CHLORIDE, PRESERVATIVE FREE 10 ML: 5 INJECTION INTRAVENOUS at 20:44

## 2021-01-09 RX ADMIN — IPRATROPIUM BROMIDE 2 PUFF: 17 AEROSOL, METERED RESPIRATORY (INHALATION) at 20:45

## 2021-01-09 RX ADMIN — GUAIFENESIN AND DEXTROMETHORPHAN 5 ML: 100; 10 SYRUP ORAL at 21:21

## 2021-01-09 RX ADMIN — ACETAMINOPHEN 650 MG: 650 SUPPOSITORY RECTAL at 13:08

## 2021-01-09 RX ADMIN — ANASTROZOLE 1 MG: 1 TABLET ORAL at 09:20

## 2021-01-09 RX ADMIN — Medication 2000 UNITS: at 09:13

## 2021-01-09 RX ADMIN — ATORVASTATIN CALCIUM 40 MG: 40 TABLET, FILM COATED ORAL at 09:13

## 2021-01-09 RX ADMIN — GUAIFENESIN AND DEXTROMETHORPHAN 5 ML: 100; 10 SYRUP ORAL at 05:52

## 2021-01-09 RX ADMIN — ACETAMINOPHEN 650 MG: 325 TABLET ORAL at 05:52

## 2021-01-09 RX ADMIN — IPRATROPIUM BROMIDE 2 PUFF: 17 AEROSOL, METERED RESPIRATORY (INHALATION) at 12:30

## 2021-01-09 RX ADMIN — IPRATROPIUM BROMIDE 2 PUFF: 17 AEROSOL, METERED RESPIRATORY (INHALATION) at 15:54

## 2021-01-09 RX ADMIN — DULOXETINE HYDROCHLORIDE 60 MG: 60 CAPSULE, DELAYED RELEASE ORAL at 09:13

## 2021-01-09 RX ADMIN — ONDANSETRON 4 MG: 2 INJECTION INTRAMUSCULAR; INTRAVENOUS at 16:12

## 2021-01-09 RX ADMIN — POLYETHYLENE GLYCOL 3350 17 G: 17 POWDER, FOR SOLUTION ORAL at 09:13

## 2021-01-09 RX ADMIN — ASPIRIN 81 MG 81 MG: 81 TABLET ORAL at 09:13

## 2021-01-09 RX ADMIN — METOPROLOL TARTRATE 100 MG: 50 TABLET, FILM COATED ORAL at 09:13

## 2021-01-09 RX ADMIN — ALBUTEROL SULFATE 2 PUFF: 90 AEROSOL, METERED RESPIRATORY (INHALATION) at 20:45

## 2021-01-09 RX ADMIN — METOPROLOL TARTRATE 50 MG: 50 TABLET ORAL at 23:16

## 2021-01-09 RX ADMIN — LISINOPRIL 30 MG: 10 TABLET ORAL at 09:13

## 2021-01-09 RX ADMIN — DEXAMETHASONE SODIUM PHOSPHATE 10 MG: 10 INJECTION, SOLUTION INTRAMUSCULAR; INTRAVENOUS at 09:13

## 2021-01-09 RX ADMIN — Medication 2 PUFF: at 20:45

## 2021-01-09 RX ADMIN — ENOXAPARIN SODIUM 40 MG: 40 INJECTION SUBCUTANEOUS at 09:14

## 2021-01-09 RX ADMIN — ENOXAPARIN SODIUM 40 MG: 40 INJECTION SUBCUTANEOUS at 20:44

## 2021-01-09 RX ADMIN — SODIUM CHLORIDE, PRESERVATIVE FREE 10 ML: 5 INJECTION INTRAVENOUS at 09:14

## 2021-01-09 RX ADMIN — STANDARDIZED SENNA CONCENTRATE 8.6 MG: 8.6 TABLET ORAL at 20:44

## 2021-01-09 RX ADMIN — LEVOTHYROXINE SODIUM 75 MCG: 0.07 TABLET ORAL at 05:52

## 2021-01-09 RX ADMIN — ALBUTEROL SULFATE 2 PUFF: 90 AEROSOL, METERED RESPIRATORY (INHALATION) at 15:54

## 2021-01-09 RX ADMIN — BISACODYL 10 MG: 10 SUPPOSITORY RECTAL at 10:06

## 2021-01-09 ASSESSMENT — PAIN SCALES - GENERAL
PAINLEVEL_OUTOF10: 3
PAINLEVEL_OUTOF10: 0
PAINLEVEL_OUTOF10: 0
PAINLEVEL_OUTOF10: 3
PAINLEVEL_OUTOF10: 1

## 2021-01-09 ASSESSMENT — PAIN DESCRIPTION - FREQUENCY: FREQUENCY: INTERMITTENT

## 2021-01-09 ASSESSMENT — PAIN - FUNCTIONAL ASSESSMENT
PAIN_FUNCTIONAL_ASSESSMENT: ACTIVITIES ARE NOT PREVENTED
PAIN_FUNCTIONAL_ASSESSMENT: 0-10

## 2021-01-09 ASSESSMENT — PAIN DESCRIPTION - LOCATION: LOCATION: ABDOMEN;CHEST

## 2021-01-09 ASSESSMENT — PAIN DESCRIPTION - DESCRIPTORS
DESCRIPTORS: ACHING
DESCRIPTORS: ACHING

## 2021-01-09 NOTE — PLAN OF CARE
Loneliness or Risk for Loneliness  Goal: Demonstrate positive use of time alone when socialization is not possible  Outcome: Ongoing     Problem: Fatigue  Goal: Verbalize increase energy and improved vitality  Outcome: Ongoing     Problem: Patient Education: Go to Patient Education Activity  Goal: Patient/Family Education  Outcome: Ongoing     Problem: Falls - Risk of:  Goal: Will remain free from falls  Description: Will remain free from falls  Outcome: Ongoing  Goal: Absence of physical injury  Description: Absence of physical injury  Outcome: Ongoing     Problem: Nutrition  Goal: Optimal nutrition therapy  Outcome: Ongoing

## 2021-01-09 NOTE — PROGRESS NOTES
Hospitalist Progress Note      PCP: William Russo MD    Date of Admission: 12/28/2020      Hospital Course: admitted with non productive cough and hypoxia, with COVID 19 PNA     Subjective:    Patient seen and examined. Vapotherm improved to 35L  Having some cramping RLQ abdominal pain  Received Dulcolax suppository and enema this morning with some relief    Medications:  Reviewed    Infusion Medications    sodium chloride      dextrose       Scheduled Medications    polyethylene glycol  17 g Oral Daily    budesonide-formoterol  2 puff Inhalation BID    enoxaparin  40 mg Subcutaneous BID    dexamethasone  10 mg Intravenous Daily    amLODIPine  5 mg Oral Daily    lisinopril  30 mg Oral Daily    metoprolol tartrate  100 mg Oral BID    sodium chloride flush  10 mL Intravenous 2 times per day    aspirin  81 mg Oral Daily    levothyroxine  75 mcg Oral Daily    atorvastatin  40 mg Oral Daily    insulin lispro  0-6 Units Subcutaneous TID WC    insulin lispro  0-3 Units Subcutaneous Nightly    albuterol sulfate HFA  2 puff Inhalation 4x daily    And    ipratropium  2 puff Inhalation 4x daily    Vitamin D  2,000 Units Oral Daily    anastrozole  1 mg Oral Daily    DULoxetine  60 mg Oral Daily     PRN Meds: sodium chloride, sodium chloride flush, acetaminophen **OR** acetaminophen, glucose, dextrose, glucagon (rDNA), dextrose, ondansetron, guaiFENesin-dextromethorphan, sodium chloride      Intake/Output Summary (Last 24 hours) at 1/9/2021 1226  Last data filed at 1/9/2021 0555  Gross per 24 hour   Intake 540 ml   Output 700 ml   Net -160 ml       Physical Exam Performed:    /79   Pulse 69   Temp 98.3 °F (36.8 °C) (Oral)   Resp 18   Ht 5' 6\" (1.676 m)   Wt 180 lb 8.9 oz (81.9 kg)   SpO2 96%   BMI 29.14 kg/m²     General appearance: No apparent distress, alert and cooperative. HEENT: Conjunctivae/corneas clear, neck supple w/ full ROM  Respiratory:  Normal respiratory effort.  Faint bilateral rales  Cardiovascular: Regular rate and rhythm, normal S1/S2, no murmurs  Abdomen: Soft, non-tender, non-distended with normal bowel sounds. Musculoskeletal: No edema bilaterally  Neurologic:  No new focal deficits  Psychiatric: Alert and oriented, normal insight  Capillary Refill: Brisk,< 3 seconds   Peripheral Pulses: +2 palpable, equal bilaterally       Labs:   Recent Labs     01/07/21  0646 01/08/21  1000 01/09/21  0532   WBC 13.1* 22.9* 12.1*   HGB 12.1 13.1 12.9   HCT 38.4 41.2 38.8    420 355     Recent Labs     01/07/21  0646 01/08/21  1327 01/09/21  0532    140 140   K 4.6 4.8 4.1    104 105   CO2 32 28 26   BUN 25* 31* 29*   CREATININE 0.8 0.7 0.6   CALCIUM 8.8 9.0 8.8     Recent Labs     01/07/21  0646 01/08/21  1327 01/09/21  0532   AST 16 22 15   ALT 17 19 17   BILITOT 0.3 0.3 0.3   ALKPHOS 72 74 68     Recent Labs     01/07/21  0646 01/08/21  1000 01/09/21  0532   INR 1.06 1.04 1.02     No results for input(s): Seymour Dacosta in the last 72 hours.     Urinalysis:    No results found for: Keysha Caceres, [address], [address] [facility] [address], [address] [place] [address], [facility] 994    Radiology:  XR CHEST PORTABLE   Final Result   Bilateral ill-defined airspace opacities could represent atypical pneumonia,   multifocal bacterial pneumonia or subsegmental atelectasis                 Assessment/Plan:    Active Hospital Problems    Diagnosis    Pneumonia due to COVID-19 virus [U07.1, J12.82]    Hyperglycemia [R73.9]    Leukocytosis [D72.829]    Acute respiratory failure with hypoxia ([facility] [J96.01]    2019 novel coronavirus-infected pneumonia (NCIP) [U07.1, J12.82]    Elevated LDH [R74.02]    Elevated AST (SGOT) [R74.01]    Elevated ferritin [R79.89]    Elevated d-dimer [R79.89]    History of depression [Z86.59]    Essential hypertension [I10]    Class 2 obesity due to excess calories with body mass index (BMI) of 35.0 to 35.9 in adult [E66.09, Z68.35]    Suspected COVID-19 virus infection

## 2021-01-09 NOTE — PLAN OF CARE
Problem: Pain:  Goal: Pain level will decrease  Description: Pain level will decrease  1/8/2021 2329 by Mark Liu RN  Outcome: Ongoing  1/8/2021 2328 by Mark Liu RN  Outcome: Ongoing  1/8/2021 1110 by Arabella Silva RN  Outcome: Ongoing  Note: Pain/discomfort being managed with PRN analgesics per MD orders. Pt able to express presence and absence of pain and rate pain appropriately using numerical scale. Goal: Control of acute pain  Description: Control of acute pain  1/8/2021 2329 by Mark Liu RN  Outcome: Ongoing  1/8/2021 2328 by Mark Liu RN  Outcome: Ongoing  Goal: Control of chronic pain  Description: Control of chronic pain  1/8/2021 2329 by Mark Liu RN  Outcome: Ongoing  1/8/2021 2328 by Mark Liu RN  Outcome: Ongoing     Problem: Airway Clearance - Ineffective  Goal: Achieve or maintain patent airway  1/8/2021 2329 by Mark Liu RN  Outcome: Ongoing  1/8/2021 2328 by Mark Liu RN  Outcome: Ongoing  1/8/2021 1110 by Arabella Silva RN  Outcome: Ongoing  Note:       Problem: Gas Exchange - Impaired  Goal: Absence of hypoxia  1/8/2021 2329 by Mark Liu RN  Outcome: Ongoing  1/8/2021 2328 by Mark Liu RN  Outcome: Ongoing  Goal: Promote optimal lung function  1/8/2021 2329 by Mark Liu RN  Outcome: Ongoing  1/8/2021 2328 by Mark Liu RN  Outcome: Ongoing     Problem: Breathing Pattern - Ineffective  Goal: Ability to achieve and maintain a regular respiratory rate  1/8/2021 2329 by Mark Liu RN  Outcome: Ongoing  1/8/2021 2328 by Mark Liu RN  Outcome: Ongoing     Problem:  Body Temperature -  Risk of, Imbalanced  Goal: Ability to maintain a body temperature within defined limits  1/8/2021 2329 by Mark Liu RN  Outcome: Ongoing  1/8/2021 2328 by Mark Liu RN  Outcome: Ongoing  1/8/2021 1110 by Arabella Silva RN  Outcome: Ongoing  Note:    Goal: Will regain or maintain usual level of consciousness  1/8/2021 2329 by Romaine Huynh RN  Outcome: Ongoing  1/8/2021 2328 by Romaine Huynh RN  Outcome: Ongoing  Goal: Complications related to the disease process, condition or treatment will be avoided or minimized  1/8/2021 2329 by Romaine Huynh RN  Outcome: Ongoing  1/8/2021 2328 by Romaine Huynh RN  Outcome: Ongoing     Problem: Isolation Precautions - Risk of Spread of Infection  Goal: Prevent transmission of infection  1/8/2021 2329 by Romaine Huynh RN  Outcome: Ongoing  1/8/2021 2328 by Romaine Huynh RN  Outcome: Ongoing     Problem: Nutrition Deficits  Goal: Optimize nutrtional status  1/8/2021 2329 by Romaine Huynh RN  Outcome: Ongoing  1/8/2021 2328 by Romaine Huynh RN  Outcome: Ongoing     Problem: Risk for Fluid Volume Deficit  Goal: Maintain normal heart rhythm  1/8/2021 2329 by Romaine Huynh RN  Outcome: Ongoing  1/8/2021 2328 by Rmoaine Huynh RN  Outcome: Ongoing  Goal: Maintain absence of muscle cramping  1/8/2021 2329 by Romaine Huynh RN  Outcome: Ongoing  1/8/2021 2328 by Romaine Huynh RN  Outcome: Ongoing  Goal: Maintain normal serum potassium, sodium, calcium, phosphorus, and pH  1/8/2021 2329 by Romaine Huynh RN  Outcome: Ongoing  1/8/2021 2328 by Romaine Huynh RN  Outcome: Ongoing     Problem: Loneliness or Risk for Loneliness  Goal: Demonstrate positive use of time alone when socialization is not possible  1/8/2021 2329 by Romaine Huynh RN  Outcome: Ongoing  1/8/2021 2328 by Romaine Huynh RN  Outcome: Ongoing     Problem: Fatigue  Goal: Verbalize increase energy and improved vitality  1/8/2021 2329 by Romaine Huynh RN  Outcome: Ongoing  1/8/2021 2328 by Romaine Huynh RN  Outcome: Ongoing     Problem: Patient Education: Go to Patient Education Activity  Goal: Patient/Family Education  1/8/2021 2329 by Romaine Huynh RN  Outcome: Ongoing  1/8/2021 2328 by Romaine Huynh RN  Outcome: Ongoing     Problem: Falls - Risk of:  Goal: Will remain free from falls  Description: Will remain free from falls  1/8/2021 2329 by Michael Rivas RN  Outcome: Ongoing  1/8/2021 2328 by Michael Rivas RN  Outcome: Ongoing  Goal: Absence of physical injury  Description: Absence of physical injury  1/8/2021 2329 by Michael Rivas RN  Outcome: Ongoing  1/8/2021 2328 by Michael Rivas RN  Outcome: Ongoing     Problem: Nutrition  Goal: Optimal nutrition therapy  1/8/2021 2329 by Michael Rivas RN  Outcome: Ongoing  1/8/2021 2328 by Michael Rivas RN  Outcome: Ongoing

## 2021-01-10 ENCOUNTER — APPOINTMENT (OUTPATIENT)
Dept: CT IMAGING | Age: 66
DRG: 177 | End: 2021-01-10
Payer: MEDICARE

## 2021-01-10 LAB
A/G RATIO: 0.7 (ref 1.1–2.2)
ALBUMIN SERPL-MCNC: 2.4 G/DL (ref 3.4–5)
ALP BLD-CCNC: 96 U/L (ref 40–129)
ALT SERPL-CCNC: 14 U/L (ref 10–40)
ANION GAP SERPL CALCULATED.3IONS-SCNC: 14 MMOL/L (ref 3–16)
APTT: 29.9 SEC (ref 24.2–36.2)
AST SERPL-CCNC: 18 U/L (ref 15–37)
BACTERIA: ABNORMAL /HPF
BASOPHILS ABSOLUTE: 0 K/UL (ref 0–0.2)
BASOPHILS RELATIVE PERCENT: 0 %
BILIRUB SERPL-MCNC: 0.5 MG/DL (ref 0–1)
BILIRUBIN URINE: ABNORMAL
BLOOD, URINE: ABNORMAL
BUN BLDV-MCNC: 35 MG/DL (ref 7–20)
C-REACTIVE PROTEIN: 133.1 MG/L (ref 0–5.1)
CALCIUM SERPL-MCNC: 9.2 MG/DL (ref 8.3–10.6)
CHLORIDE BLD-SCNC: 100 MMOL/L (ref 99–110)
CLARITY: ABNORMAL
CO2: 25 MMOL/L (ref 21–32)
COLOR: ABNORMAL
COMMENT UA: ABNORMAL
CREAT SERPL-MCNC: 1.2 MG/DL (ref 0.6–1.2)
CREATININE URINE: 164.3 MG/DL (ref 28–259)
D DIMER: 3995 NG/ML DDU (ref 0–229)
EOSINOPHILS ABSOLUTE: 0 K/UL (ref 0–0.6)
EOSINOPHILS RELATIVE PERCENT: 0.1 %
EPITHELIAL CELLS, UA: 1 /HPF (ref 0–5)
FIBRINOGEN: 616 MG/DL (ref 200–397)
GFR AFRICAN AMERICAN: 55
GFR NON-AFRICAN AMERICAN: 45
GLOBULIN: 3.3 G/DL
GLUCOSE BLD-MCNC: 104 MG/DL (ref 70–99)
GLUCOSE BLD-MCNC: 104 MG/DL (ref 70–99)
GLUCOSE BLD-MCNC: 108 MG/DL (ref 70–99)
GLUCOSE BLD-MCNC: 108 MG/DL (ref 70–99)
GLUCOSE BLD-MCNC: 70 MG/DL (ref 70–99)
GLUCOSE URINE: NEGATIVE MG/DL
HCT VFR BLD CALC: 38.1 % (ref 36–48)
HEMOGLOBIN: 11.9 G/DL (ref 12–16)
INR BLD: 1.14 (ref 0.86–1.14)
KETONES, URINE: NEGATIVE MG/DL
LACTIC ACID: 2.2 MMOL/L (ref 0.4–2)
LEUKOCYTE ESTERASE, URINE: ABNORMAL
LYMPHOCYTES ABSOLUTE: 0.9 K/UL (ref 1–5.1)
LYMPHOCYTES RELATIVE PERCENT: 2.7 %
MCH RBC QN AUTO: 29.3 PG (ref 26–34)
MCHC RBC AUTO-ENTMCNC: 31.3 G/DL (ref 31–36)
MCV RBC AUTO: 93.4 FL (ref 80–100)
MICROSCOPIC EXAMINATION: YES
MONOCYTES ABSOLUTE: 1.2 K/UL (ref 0–1.3)
MONOCYTES RELATIVE PERCENT: 3.6 %
NEUTROPHILS ABSOLUTE: 31.1 K/UL (ref 1.7–7.7)
NEUTROPHILS RELATIVE PERCENT: 93.6 %
NITRITE, URINE: NEGATIVE
PDW BLD-RTO: 14.6 % (ref 12.4–15.4)
PERFORMED ON: ABNORMAL
PH UA: 5.5 (ref 5–8)
PLATELET # BLD: 265 K/UL (ref 135–450)
PMV BLD AUTO: 9.3 FL (ref 5–10.5)
POTASSIUM REFLEX MAGNESIUM: 4.2 MMOL/L (ref 3.5–5.1)
PROCALCITONIN: 36.13 NG/ML (ref 0–0.15)
PROTEIN UA: 100 MG/DL
PROTHROMBIN TIME: 13.2 SEC (ref 10–13.2)
RBC # BLD: 4.08 M/UL (ref 4–5.2)
RBC UA: 34 /HPF (ref 0–4)
SODIUM BLD-SCNC: 139 MMOL/L (ref 136–145)
SODIUM URINE: 26 MMOL/L
SPECIFIC GRAVITY UA: 1.02 (ref 1–1.03)
TOTAL PROTEIN: 5.7 G/DL (ref 6.4–8.2)
URINE TYPE: ABNORMAL
UROBILINOGEN, URINE: 0.2 E.U./DL
WBC # BLD: 33.1 K/UL (ref 4–11)
WBC UA: 153 /HPF (ref 0–5)

## 2021-01-10 PROCEDURE — 6360000004 HC RX CONTRAST MEDICATION

## 2021-01-10 PROCEDURE — 81001 URINALYSIS AUTO W/SCOPE: CPT

## 2021-01-10 PROCEDURE — 83605 ASSAY OF LACTIC ACID: CPT

## 2021-01-10 PROCEDURE — 2580000003 HC RX 258: Performed by: INTERNAL MEDICINE

## 2021-01-10 PROCEDURE — 87077 CULTURE AEROBIC IDENTIFY: CPT

## 2021-01-10 PROCEDURE — 85730 THROMBOPLASTIN TIME PARTIAL: CPT

## 2021-01-10 PROCEDURE — 85025 COMPLETE CBC W/AUTO DIFF WBC: CPT

## 2021-01-10 PROCEDURE — 85384 FIBRINOGEN ACTIVITY: CPT

## 2021-01-10 PROCEDURE — 84300 ASSAY OF URINE SODIUM: CPT

## 2021-01-10 PROCEDURE — 87040 BLOOD CULTURE FOR BACTERIA: CPT

## 2021-01-10 PROCEDURE — 80053 COMPREHEN METABOLIC PANEL: CPT

## 2021-01-10 PROCEDURE — 2700000000 HC OXYGEN THERAPY PER DAY

## 2021-01-10 PROCEDURE — 6360000002 HC RX W HCPCS: Performed by: INTERNAL MEDICINE

## 2021-01-10 PROCEDURE — 84145 PROCALCITONIN (PCT): CPT

## 2021-01-10 PROCEDURE — 2060000000 HC ICU INTERMEDIATE R&B

## 2021-01-10 PROCEDURE — 6370000000 HC RX 637 (ALT 250 FOR IP): Performed by: STUDENT IN AN ORGANIZED HEALTH CARE EDUCATION/TRAINING PROGRAM

## 2021-01-10 PROCEDURE — 82570 ASSAY OF URINE CREATININE: CPT

## 2021-01-10 PROCEDURE — 87150 DNA/RNA AMPLIFIED PROBE: CPT

## 2021-01-10 PROCEDURE — 6370000000 HC RX 637 (ALT 250 FOR IP): Performed by: INTERNAL MEDICINE

## 2021-01-10 PROCEDURE — 71260 CT THORAX DX C+: CPT

## 2021-01-10 PROCEDURE — 94640 AIRWAY INHALATION TREATMENT: CPT

## 2021-01-10 PROCEDURE — 86140 C-REACTIVE PROTEIN: CPT

## 2021-01-10 PROCEDURE — 6360000002 HC RX W HCPCS: Performed by: STUDENT IN AN ORGANIZED HEALTH CARE EDUCATION/TRAINING PROGRAM

## 2021-01-10 PROCEDURE — 87086 URINE CULTURE/COLONY COUNT: CPT

## 2021-01-10 PROCEDURE — 94761 N-INVAS EAR/PLS OXIMETRY MLT: CPT

## 2021-01-10 PROCEDURE — 87449 NOS EACH ORGANISM AG IA: CPT

## 2021-01-10 PROCEDURE — 85610 PROTHROMBIN TIME: CPT

## 2021-01-10 PROCEDURE — 85379 FIBRIN DEGRADATION QUANT: CPT

## 2021-01-10 PROCEDURE — 87186 SC STD MICRODIL/AGAR DIL: CPT

## 2021-01-10 PROCEDURE — 6370000000 HC RX 637 (ALT 250 FOR IP): Performed by: FAMILY MEDICINE

## 2021-01-10 PROCEDURE — 87641 MR-STAPH DNA AMP PROBE: CPT

## 2021-01-10 PROCEDURE — 36415 COLL VENOUS BLD VENIPUNCTURE: CPT

## 2021-01-10 PROCEDURE — 87184 SC STD DISK METHOD PER PLATE: CPT

## 2021-01-10 RX ORDER — LIDOCAINE HYDROCHLORIDE 10 MG/ML
5 INJECTION, SOLUTION EPIDURAL; INFILTRATION; INTRACAUDAL; PERINEURAL ONCE
Status: DISCONTINUED | OUTPATIENT
Start: 2021-01-10 | End: 2021-01-23 | Stop reason: HOSPADM

## 2021-01-10 RX ORDER — SODIUM CHLORIDE 9 MG/ML
INJECTION, SOLUTION INTRAVENOUS CONTINUOUS
Status: DISCONTINUED | OUTPATIENT
Start: 2021-01-10 | End: 2021-01-15

## 2021-01-10 RX ORDER — 0.9 % SODIUM CHLORIDE 0.9 %
250 INTRAVENOUS SOLUTION INTRAVENOUS ONCE
Status: COMPLETED | OUTPATIENT
Start: 2021-01-10 | End: 2021-01-10

## 2021-01-10 RX ORDER — SODIUM CHLORIDE 0.9 % (FLUSH) 0.9 %
10 SYRINGE (ML) INJECTION EVERY 12 HOURS SCHEDULED
Status: DISCONTINUED | OUTPATIENT
Start: 2021-01-10 | End: 2021-01-14 | Stop reason: SDUPTHER

## 2021-01-10 RX ORDER — LORAZEPAM 0.5 MG/1
0.5 TABLET ORAL EVERY 6 HOURS PRN
Status: DISCONTINUED | OUTPATIENT
Start: 2021-01-10 | End: 2021-01-23 | Stop reason: HOSPADM

## 2021-01-10 RX ORDER — ALBUTEROL SULFATE 90 UG/1
2 AEROSOL, METERED RESPIRATORY (INHALATION) EVERY 4 HOURS PRN
Status: DISCONTINUED | OUTPATIENT
Start: 2021-01-10 | End: 2021-01-16

## 2021-01-10 RX ORDER — SODIUM CHLORIDE 0.9 % (FLUSH) 0.9 %
10 SYRINGE (ML) INJECTION PRN
Status: DISCONTINUED | OUTPATIENT
Start: 2021-01-10 | End: 2021-01-14 | Stop reason: SDUPTHER

## 2021-01-10 RX ADMIN — LORAZEPAM 0.5 MG: 0.5 TABLET ORAL at 14:00

## 2021-01-10 RX ADMIN — ALBUTEROL SULFATE 2 PUFF: 90 AEROSOL, METERED RESPIRATORY (INHALATION) at 08:34

## 2021-01-10 RX ADMIN — DULOXETINE HYDROCHLORIDE 60 MG: 60 CAPSULE, DELAYED RELEASE ORAL at 08:24

## 2021-01-10 RX ADMIN — POLYETHYLENE GLYCOL 3350 17 G: 17 POWDER, FOR SOLUTION ORAL at 08:23

## 2021-01-10 RX ADMIN — GUAIFENESIN AND DEXTROMETHORPHAN 5 ML: 100; 10 SYRUP ORAL at 08:25

## 2021-01-10 RX ADMIN — ONDANSETRON 4 MG: 2 INJECTION INTRAMUSCULAR; INTRAVENOUS at 00:20

## 2021-01-10 RX ADMIN — AMLODIPINE BESYLATE 5 MG: 5 TABLET ORAL at 08:24

## 2021-01-10 RX ADMIN — IOPAMIDOL 75 ML: 755 INJECTION, SOLUTION INTRAVENOUS at 16:38

## 2021-01-10 RX ADMIN — ASPIRIN 81 MG 81 MG: 81 TABLET ORAL at 08:26

## 2021-01-10 RX ADMIN — ALBUTEROL SULFATE 2 PUFF: 90 AEROSOL, METERED RESPIRATORY (INHALATION) at 12:00

## 2021-01-10 RX ADMIN — DEXAMETHASONE SODIUM PHOSPHATE 10 MG: 10 INJECTION, SOLUTION INTRAMUSCULAR; INTRAVENOUS at 08:25

## 2021-01-10 RX ADMIN — IPRATROPIUM BROMIDE 2 PUFF: 17 AEROSOL, METERED RESPIRATORY (INHALATION) at 08:34

## 2021-01-10 RX ADMIN — Medication 2 PUFF: at 23:40

## 2021-01-10 RX ADMIN — LORAZEPAM 0.5 MG: 0.5 TABLET ORAL at 20:30

## 2021-01-10 RX ADMIN — SODIUM CHLORIDE: 9 INJECTION, SOLUTION INTRAVENOUS at 23:38

## 2021-01-10 RX ADMIN — STANDARDIZED SENNA CONCENTRATE 8.6 MG: 8.6 TABLET ORAL at 20:30

## 2021-01-10 RX ADMIN — ENOXAPARIN SODIUM 40 MG: 40 INJECTION SUBCUTANEOUS at 20:30

## 2021-01-10 RX ADMIN — ANASTROZOLE 1 MG: 1 TABLET ORAL at 08:24

## 2021-01-10 RX ADMIN — Medication 2000 UNITS: at 08:24

## 2021-01-10 RX ADMIN — VANCOMYCIN HYDROCHLORIDE 1250 MG: 10 INJECTION, POWDER, LYOPHILIZED, FOR SOLUTION INTRAVENOUS at 14:00

## 2021-01-10 RX ADMIN — CEFEPIME HYDROCHLORIDE 2 G: 2 INJECTION, POWDER, FOR SOLUTION INTRAVENOUS at 14:00

## 2021-01-10 RX ADMIN — IPRATROPIUM BROMIDE 2 PUFF: 17 AEROSOL, METERED RESPIRATORY (INHALATION) at 12:00

## 2021-01-10 RX ADMIN — SODIUM CHLORIDE 250 ML: 9 INJECTION, SOLUTION INTRAVENOUS at 10:34

## 2021-01-10 RX ADMIN — ENOXAPARIN SODIUM 40 MG: 40 INJECTION SUBCUTANEOUS at 08:24

## 2021-01-10 RX ADMIN — LEVOTHYROXINE SODIUM 75 MCG: 0.07 TABLET ORAL at 05:55

## 2021-01-10 RX ADMIN — ACETAMINOPHEN 650 MG: 650 SUPPOSITORY RECTAL at 00:29

## 2021-01-10 RX ADMIN — Medication 2 PUFF: at 08:34

## 2021-01-10 RX ADMIN — GUAIFENESIN AND DEXTROMETHORPHAN 5 ML: 100; 10 SYRUP ORAL at 18:26

## 2021-01-10 RX ADMIN — SODIUM CHLORIDE: 9 INJECTION, SOLUTION INTRAVENOUS at 10:35

## 2021-01-10 RX ADMIN — ATORVASTATIN CALCIUM 40 MG: 40 TABLET, FILM COATED ORAL at 08:24

## 2021-01-10 RX ADMIN — ACETAMINOPHEN 650 MG: 325 TABLET ORAL at 18:26

## 2021-01-10 ASSESSMENT — PAIN SCALES - GENERAL
PAINLEVEL_OUTOF10: 2
PAINLEVEL_OUTOF10: 0
PAINLEVEL_OUTOF10: 3

## 2021-01-10 NOTE — PROGRESS NOTES
Patient is resting comfortably in her chair with no complaints of pain. Patients blood pressure is 88/56 this morning and asymptomatic. Pulse at 100. Up to bedside commode and back to chair. 35L of Heated [place]. Patient states she feels better than the episodes that occurred last night.

## 2021-01-10 NOTE — PROGRESS NOTES
Patient called out for warm blankets. When I arrived, patient stated \"it is happening again\". She went on to explain that she had this shaking spell (shivering) this morning on day shift and that she received tylenol for pain, zofran for nausea, and a heating pad as well with warm compresses on her neck and forehead. Nurse did all the above again and is waiting for some relief of her shivering. Temperature in room was set at 67 degrees and heat was increased as well. Axillary temp 99.7. Will continue to monitor patient.

## 2021-01-10 NOTE — PROGRESS NOTES
Hospitalist Progress Note      PCP: Usman Avila MD    Date of Admission: 12/28/2020      Hospital Course: admitted with non productive cough and hypoxia, with COVID 19 PNA     Subjective:    Patient seen and examined.    Vapotherm improved to 35L  Having some chills overnight    Medications:  Reviewed    Infusion Medications    sodium chloride 100 mL/hr at 01/10/21 1035    sodium chloride      dextrose       Scheduled Medications    lidocaine 1 % injection  5 mL Intradermal Once    sodium chloride flush  10 mL Intravenous 2 times per day    cefepime  2 g Intravenous Q12H    senna  1 tablet Oral Nightly    polyethylene glycol  17 g Oral Daily    budesonide-formoterol  2 puff Inhalation BID    enoxaparin  40 mg Subcutaneous BID    [Held by provider] amLODIPine  5 mg Oral Daily    [Held by provider] lisinopril  30 mg Oral Daily    [Held by provider] metoprolol tartrate  100 mg Oral BID    sodium chloride flush  10 mL Intravenous 2 times per day    aspirin  81 mg Oral Daily    levothyroxine  75 mcg Oral Daily    atorvastatin  40 mg Oral Daily    insulin lispro  0-6 Units Subcutaneous TID WC    insulin lispro  0-3 Units Subcutaneous Nightly    albuterol sulfate HFA  2 puff Inhalation 4x daily    And    ipratropium  2 puff Inhalation 4x daily    Vitamin D  2,000 Units Oral Daily    anastrozole  1 mg Oral Daily    DULoxetine  60 mg Oral Daily     PRN Meds: sodium chloride flush, sodium chloride, sodium chloride flush, acetaminophen **OR** acetaminophen, glucose, dextrose, glucagon (rDNA), dextrose, ondansetron, guaiFENesin-dextromethorphan, sodium chloride      Intake/Output Summary (Last 24 hours) at 1/10/2021 1230  Last data filed at 1/10/2021 1152  Gross per 24 hour   Intake 780 ml   Output 300 ml   Net 480 ml       Physical Exam Performed:    /74   Pulse 97   Temp 98.4 °F (36.9 °C) (Oral)   Resp 20   Ht 5' 6\" (1.676 m)   Wt 180 lb 8.9 oz (81.9 kg)   SpO2 (!) 89%   BMI 29.14 kg/m²     General appearance: No apparent distress, alert and cooperative. HEENT: Conjunctivae/corneas clear, neck supple w/ full ROM  Respiratory:  Normal respiratory effort. Faint bilateral rales  Cardiovascular: Regular rate and rhythm, normal S1/S2, no murmurs  Abdomen: Soft, non-tender, non-distended with normal bowel sounds. Musculoskeletal: No edema bilaterally  Neurologic:  No new focal deficits  Psychiatric: Alert and oriented, normal insight  Capillary Refill: Brisk,< 3 seconds   Peripheral Pulses: +2 palpable, equal bilaterally       Labs:   Recent Labs     01/08/21  1000 01/09/21  0532 01/10/21  1154   WBC 22.9* 12.1* 33.1*   HGB 13.1 12.9 11.9*   HCT 41.2 38.8 38.1    355 265     Recent Labs     01/08/21  1327 01/09/21  0532 01/10/21  0611    140 139   K 4.8 4.1 4.2    105 100   CO2 28 26 25   BUN 31* 29* 35*   CREATININE 0.7 0.6 1.2   CALCIUM 9.0 8.8 9.2     Recent Labs     01/08/21  1327 01/09/21  0532 01/10/21  0611   AST 22 15 18   ALT 19 17 14   BILITOT 0.3 0.3 0.5   ALKPHOS 74 68 96     Recent Labs     01/08/21  1000 01/09/21  0532 01/10/21  0611   INR 1.04 1.02 1.14     No results for input(s): Eugenie Rendon in the last 72 hours.     Urinalysis:    No results found for: Terrence Razo, [address], [address] [facility] [address], [address] [facility], [facility], [facility] 27, [facility]    Radiology:  XR CHEST PORTABLE   Final Result   Bilateral ill-defined airspace opacities could represent atypical pneumonia,   multifocal bacterial pneumonia or subsegmental atelectasis         CTA PULMONARY W CONTRAST    (Results Pending)           Assessment/Plan:    Active Hospital Problems    Diagnosis    Pneumonia due to COVID-19 virus [U07.1, J12.82]    Hyperglycemia [R73.9]    Leukocytosis [D72.829]    Acute respiratory failure with hypoxia ([address] [J96.01]    2019 novel coronavirus-infected pneumonia (NCIP) [U07.1, J12.82]    Elevated LDH [R74.02]    Elevated AST (SGOT) [R74.01]    Elevated ferritin [R79.89]    Elevated

## 2021-01-10 NOTE — PROGRESS NOTES
Patient now states she is very hot and have called out multiple times to be cooled down. Cool compresses and cold drinks given. Blankets, gown, and heating pad removed completely. Patient is in chair. Temperature in roomed turned down. Will continue to monitor.

## 2021-01-10 NOTE — CONSULTS
Clinical Pharmacy Note  Vancomycin Consult    Glendy Whitney is a 63 y.o. female ordered Vancomycin for PNA; consult received from Dr. Edouard Jackman to manage therapy. Also receiving cefepime. Patient Active Problem List   Diagnosis    Malignant neoplasm of central portion of right female breast ([facility] [address]    Encounter for consultation    Encounter for antineoplastic radiation therapy    ER+ (estrogen receptor positive status)    [facility]    Use of anastrozole (Arimidex)    Visit for monitoring Arimidex therapy    Osteopenia    Pneumonia due to organism    Suspected COVID-19 virus infection    Hypothyroidism    HLD (hyperlipidemia)    PCOS (polycystic ovarian syndrome)    [facility] failure with hypoxia ([facility]    2019 novel coronavirus-infected pneumonia (NCIP)    Elevated LDH    Elevated AST (SGOT)    Elevated ferritin    Elevated d-dimer    History of depression    Essential hypertension    Class 2 obesity due to excess calories with body mass index (BMI) of 35.0 to 35.9 in adult    Pneumonia due to COVID-19 virus    Hyperglycemia    Leukocytosis       Allergies:  Mercury     Temp max:  Temp (24hrs), Av.6 °F (37 °C), Min:98.1 °F (36.7 °C), Max:99.7 °F (37.6 °C)      Recent Labs     21  1000 21  0532 01/10/21  1154   WBC 22.9* 12.1* 33.1*       Recent Labs     21  1327 21  0532 01/10/21  0611   BUN 31* 29* 35*   CREATININE 0.7 0.6 1.2         Intake/Output Summary (Last 24 hours) at 1/10/2021 1247  Last data filed at 1/10/2021 1152  Gross per 24 hour   Intake 780 ml   Output 300 ml   Net 480 ml       Culture Results:  pending    Ht Readings from Last 1 Encounters:   21 5' 6\" (1.676 m)        Wt Readings from Last 1 Encounters:   01/10/21 180 lb 8.9 oz (81.9 kg)         Estimated Creatinine Clearance: 52 mL/min (based on SCr of 1.2 mg/dL).     Assessment/Plan:    Vanco day # 1  Patient with ELIDIA (SCr 0.6 mg/dL yesterday - up to 1.2 mg/dL today)  Vanco 1250 mg x 1   Random vanco tomorrow am.    Thank you for the consult.      Fiona RudolphD.  1/10/2021  12:48 PM

## 2021-01-11 LAB
A/G RATIO: 0.7 (ref 1.1–2.2)
ALBUMIN SERPL-MCNC: 2.2 G/DL (ref 3.4–5)
ALP BLD-CCNC: 96 U/L (ref 40–129)
ALT SERPL-CCNC: 15 U/L (ref 10–40)
ANION GAP SERPL CALCULATED.3IONS-SCNC: 10 MMOL/L (ref 3–16)
APTT: 31.8 SEC (ref 24.2–36.2)
AST SERPL-CCNC: 18 U/L (ref 15–37)
BASOPHILS ABSOLUTE: 0 K/UL (ref 0–0.2)
BASOPHILS RELATIVE PERCENT: 0.1 %
BILIRUB SERPL-MCNC: <0.2 MG/DL (ref 0–1)
BUN BLDV-MCNC: 32 MG/DL (ref 7–20)
CALCIUM SERPL-MCNC: 8.9 MG/DL (ref 8.3–10.6)
CHLORIDE BLD-SCNC: 103 MMOL/L (ref 99–110)
CO2: 27 MMOL/L (ref 21–32)
CREAT SERPL-MCNC: 1 MG/DL (ref 0.6–1.2)
EKG ATRIAL RATE: 127 BPM
EKG DIAGNOSIS: NORMAL
EKG P AXIS: 43 DEGREES
EKG P-R INTERVAL: 134 MS
EKG Q-T INTERVAL: 304 MS
EKG QRS DURATION: 72 MS
EKG QTC CALCULATION (BAZETT): 441 MS
EKG R AXIS: 19 DEGREES
EKG T AXIS: 64 DEGREES
EKG VENTRICULAR RATE: 127 BPM
EOSINOPHILS ABSOLUTE: 0.1 K/UL (ref 0–0.6)
EOSINOPHILS RELATIVE PERCENT: 0.5 %
FIBRINOGEN: 739 MG/DL (ref 200–397)
GFR AFRICAN AMERICAN: >60
GFR NON-AFRICAN AMERICAN: 56
GLOBULIN: 3.3 G/DL
GLUCOSE BLD-MCNC: 102 MG/DL (ref 70–99)
GLUCOSE BLD-MCNC: 117 MG/DL (ref 70–99)
GLUCOSE BLD-MCNC: 119 MG/DL (ref 70–99)
GLUCOSE BLD-MCNC: 87 MG/DL (ref 70–99)
GLUCOSE BLD-MCNC: 89 MG/DL (ref 70–99)
HCT VFR BLD CALC: 36.3 % (ref 36–48)
HEMOGLOBIN: 11.8 G/DL (ref 12–16)
INR BLD: 1.14 (ref 0.86–1.14)
L. PNEUMOPHILA SEROGP 1 UR AG: NORMAL
LYMPHOCYTES ABSOLUTE: 0.5 K/UL (ref 1–5.1)
LYMPHOCYTES RELATIVE PERCENT: 2.6 %
MCH RBC QN AUTO: 29.9 PG (ref 26–34)
MCHC RBC AUTO-ENTMCNC: 32.4 G/DL (ref 31–36)
MCV RBC AUTO: 92.2 FL (ref 80–100)
MONOCYTES ABSOLUTE: 0.6 K/UL (ref 0–1.3)
MONOCYTES RELATIVE PERCENT: 3.2 %
MRSA SCREEN RT-PCR: NORMAL
NEUTROPHILS ABSOLUTE: 16.7 K/UL (ref 1.7–7.7)
NEUTROPHILS RELATIVE PERCENT: 93.6 %
PDW BLD-RTO: 14.7 % (ref 12.4–15.4)
PERFORMED ON: ABNORMAL
PERFORMED ON: ABNORMAL
PERFORMED ON: NORMAL
PERFORMED ON: NORMAL
PLATELET # BLD: 207 K/UL (ref 135–450)
PMV BLD AUTO: 9.9 FL (ref 5–10.5)
POTASSIUM REFLEX MAGNESIUM: 4 MMOL/L (ref 3.5–5.1)
PROCALCITONIN: 27.07 NG/ML (ref 0–0.15)
PROTHROMBIN TIME: 13.2 SEC (ref 10–13.2)
RBC # BLD: 3.94 M/UL (ref 4–5.2)
REPORT: NORMAL
SODIUM BLD-SCNC: 140 MMOL/L (ref 136–145)
STREP PNEUMONIAE ANTIGEN, URINE: NORMAL
TOTAL PROTEIN: 5.5 G/DL (ref 6.4–8.2)
VANCOMYCIN RANDOM: 9.7 UG/ML
WBC # BLD: 17.8 K/UL (ref 4–11)

## 2021-01-11 PROCEDURE — 2580000003 HC RX 258: Performed by: STUDENT IN AN ORGANIZED HEALTH CARE EDUCATION/TRAINING PROGRAM

## 2021-01-11 PROCEDURE — 2580000003 HC RX 258: Performed by: INTERNAL MEDICINE

## 2021-01-11 PROCEDURE — 6370000000 HC RX 637 (ALT 250 FOR IP): Performed by: STUDENT IN AN ORGANIZED HEALTH CARE EDUCATION/TRAINING PROGRAM

## 2021-01-11 PROCEDURE — 6370000000 HC RX 637 (ALT 250 FOR IP): Performed by: INTERNAL MEDICINE

## 2021-01-11 PROCEDURE — 36415 COLL VENOUS BLD VENIPUNCTURE: CPT

## 2021-01-11 PROCEDURE — 2060000000 HC ICU INTERMEDIATE R&B

## 2021-01-11 PROCEDURE — 93005 ELECTROCARDIOGRAM TRACING: CPT | Performed by: PHYSICIAN ASSISTANT

## 2021-01-11 PROCEDURE — 85384 FIBRINOGEN ACTIVITY: CPT

## 2021-01-11 PROCEDURE — 84145 PROCALCITONIN (PCT): CPT

## 2021-01-11 PROCEDURE — 6360000002 HC RX W HCPCS: Performed by: STUDENT IN AN ORGANIZED HEALTH CARE EDUCATION/TRAINING PROGRAM

## 2021-01-11 PROCEDURE — 94640 AIRWAY INHALATION TREATMENT: CPT

## 2021-01-11 PROCEDURE — 6370000000 HC RX 637 (ALT 250 FOR IP): Performed by: FAMILY MEDICINE

## 2021-01-11 PROCEDURE — 99223 1ST HOSP IP/OBS HIGH 75: CPT | Performed by: INTERNAL MEDICINE

## 2021-01-11 PROCEDURE — 94761 N-INVAS EAR/PLS OXIMETRY MLT: CPT

## 2021-01-11 PROCEDURE — 2700000000 HC OXYGEN THERAPY PER DAY

## 2021-01-11 PROCEDURE — 6360000002 HC RX W HCPCS: Performed by: INTERNAL MEDICINE

## 2021-01-11 PROCEDURE — 93010 ELECTROCARDIOGRAM REPORT: CPT | Performed by: INTERNAL MEDICINE

## 2021-01-11 PROCEDURE — 80202 ASSAY OF VANCOMYCIN: CPT

## 2021-01-11 PROCEDURE — 85730 THROMBOPLASTIN TIME PARTIAL: CPT

## 2021-01-11 PROCEDURE — 85025 COMPLETE CBC W/AUTO DIFF WBC: CPT

## 2021-01-11 PROCEDURE — 85610 PROTHROMBIN TIME: CPT

## 2021-01-11 PROCEDURE — 80053 COMPREHEN METABOLIC PANEL: CPT

## 2021-01-11 RX ADMIN — ENOXAPARIN SODIUM 40 MG: 40 INJECTION SUBCUTANEOUS at 09:04

## 2021-01-11 RX ADMIN — ATORVASTATIN CALCIUM 40 MG: 40 TABLET, FILM COATED ORAL at 09:04

## 2021-01-11 RX ADMIN — CEFEPIME HYDROCHLORIDE 2 G: 2 INJECTION, POWDER, FOR SOLUTION INTRAVENOUS at 00:43

## 2021-01-11 RX ADMIN — ENOXAPARIN SODIUM 40 MG: 40 INJECTION SUBCUTANEOUS at 22:05

## 2021-01-11 RX ADMIN — CEFEPIME HYDROCHLORIDE 2 G: 2 INJECTION, POWDER, FOR SOLUTION INTRAVENOUS at 13:17

## 2021-01-11 RX ADMIN — DULOXETINE HYDROCHLORIDE 60 MG: 60 CAPSULE, DELAYED RELEASE ORAL at 09:04

## 2021-01-11 RX ADMIN — SODIUM CHLORIDE: 9 INJECTION, SOLUTION INTRAVENOUS at 22:06

## 2021-01-11 RX ADMIN — Medication 2 PUFF: at 20:04

## 2021-01-11 RX ADMIN — GUAIFENESIN AND DEXTROMETHORPHAN 5 ML: 100; 10 SYRUP ORAL at 22:05

## 2021-01-11 RX ADMIN — Medication 2000 UNITS: at 09:04

## 2021-01-11 RX ADMIN — ONDANSETRON 4 MG: 2 INJECTION INTRAMUSCULAR; INTRAVENOUS at 16:52

## 2021-01-11 RX ADMIN — ANASTROZOLE 1 MG: 1 TABLET ORAL at 09:06

## 2021-01-11 RX ADMIN — SODIUM CHLORIDE, PRESERVATIVE FREE 10 ML: 5 INJECTION INTRAVENOUS at 09:06

## 2021-01-11 RX ADMIN — STANDARDIZED SENNA CONCENTRATE 8.6 MG: 8.6 TABLET ORAL at 22:05

## 2021-01-11 RX ADMIN — ACETAMINOPHEN 650 MG: 325 TABLET ORAL at 16:52

## 2021-01-11 RX ADMIN — LORAZEPAM 0.5 MG: 0.5 TABLET ORAL at 22:05

## 2021-01-11 RX ADMIN — GUAIFENESIN AND DEXTROMETHORPHAN 5 ML: 100; 10 SYRUP ORAL at 16:52

## 2021-01-11 RX ADMIN — Medication 2 PUFF: at 09:05

## 2021-01-11 RX ADMIN — ACETAMINOPHEN 650 MG: 325 TABLET ORAL at 00:44

## 2021-01-11 RX ADMIN — VANCOMYCIN HYDROCHLORIDE 1000 MG: 1 INJECTION, POWDER, LYOPHILIZED, FOR SOLUTION INTRAVENOUS at 09:02

## 2021-01-11 RX ADMIN — POLYETHYLENE GLYCOL 3350 17 G: 17 POWDER, FOR SOLUTION ORAL at 09:03

## 2021-01-11 RX ADMIN — LEVOTHYROXINE SODIUM 75 MCG: 0.07 TABLET ORAL at 06:18

## 2021-01-11 RX ADMIN — ASPIRIN 81 MG 81 MG: 81 TABLET ORAL at 09:04

## 2021-01-11 ASSESSMENT — PAIN DESCRIPTION - PAIN TYPE
TYPE: ACUTE PAIN
TYPE: ACUTE PAIN

## 2021-01-11 ASSESSMENT — PAIN SCALES - GENERAL: PAINLEVEL_OUTOF10: 0

## 2021-01-11 ASSESSMENT — PAIN - FUNCTIONAL ASSESSMENT: PAIN_FUNCTIONAL_ASSESSMENT: ACTIVITIES ARE NOT PREVENTED

## 2021-01-11 ASSESSMENT — PAIN DESCRIPTION - LOCATION
LOCATION: GENERALIZED
LOCATION: GENERALIZED

## 2021-01-11 ASSESSMENT — PAIN DESCRIPTION - DESCRIPTORS: DESCRIPTORS: ACHING

## 2021-01-11 NOTE — PROGRESS NOTES
Hospitalist Progress Note      PCP: Stuart Gottron, MD    Date of Admission: 12/28/2020      Hospital Course: admitted with non productive cough and hypoxia, with COVID 19 PNA     Subjective:    Patient seen and examined.    Vapotherm improved to 30L  Klebsiella bacteremia and Klebsiella UTI noted    Medications:  Reviewed    Infusion Medications    sodium chloride 100 mL/hr at 01/10/21 2338    sodium chloride      dextrose       Scheduled Medications    lidocaine 1 % injection  5 mL Intradermal Once    sodium chloride flush  10 mL Intravenous 2 times per day    cefepime  2 g Intravenous Q12H    senna  1 tablet Oral Nightly    polyethylene glycol  17 g Oral Daily    budesonide-formoterol  2 puff Inhalation BID    enoxaparin  40 mg Subcutaneous BID    [Held by provider] amLODIPine  5 mg Oral Daily    [Held by provider] lisinopril  30 mg Oral Daily    [Held by provider] metoprolol tartrate  100 mg Oral BID    sodium chloride flush  10 mL Intravenous 2 times per day    aspirin  81 mg Oral Daily    levothyroxine  75 mcg Oral Daily    atorvastatin  40 mg Oral Daily    insulin lispro  0-6 Units Subcutaneous TID WC    insulin lispro  0-3 Units Subcutaneous Nightly    anastrozole  1 mg Oral Daily    DULoxetine  60 mg Oral Daily     PRN Meds: sodium chloride flush, LORazepam, albuterol sulfate HFA **AND** ipratropium **AND** MDI Treatment, sodium chloride, sodium chloride flush, acetaminophen **OR** acetaminophen, glucose, dextrose, glucagon (rDNA), dextrose, ondansetron, guaiFENesin-dextromethorphan, sodium chloride      Intake/Output Summary (Last 24 hours) at 1/11/2021 1428  Last data filed at 1/11/2021 1140  Gross per 24 hour   Intake 2370 ml   Output 1275 ml   Net 1095 ml       Physical Exam Performed:    BP 99/67   Pulse 118   Temp 98.4 °F (36.9 °C) (Oral)   Resp 24   Ht 5' 6\" (1.676 m)   Wt 181 lb (82.1 kg)   SpO2 92%   BMI 29.21 kg/m²     General appearance: No apparent distress, alert and cooperative. HEENT: Conjunctivae/corneas clear, neck supple w/ full ROM  Respiratory:  Normal respiratory effort. Faint bilateral rales  Cardiovascular: Regular rate and rhythm, normal S1/S2, no murmurs  Abdomen: Soft, non-tender, non-distended with normal bowel sounds. Musculoskeletal: No edema bilaterally  Neurologic:  No new focal deficits  Psychiatric: Alert and oriented, normal insight  Capillary Refill: Brisk,< 3 seconds   Peripheral Pulses: +2 palpable, equal bilaterally       Labs:   Recent Labs     01/09/21  0532 01/10/21  1154 01/11/21  0611   WBC 12.1* 33.1* 17.8*   HGB 12.9 11.9* 11.8*   HCT 38.8 38.1 36.3    265 207     Recent Labs     01/09/21  0532 01/10/21  0611 01/11/21  0611    139 140   K 4.1 4.2 4.0    100 103   CO2 26 25 27   BUN 29* 35* 32*   CREATININE 0.6 1.2 1.0   CALCIUM 8.8 9.2 8.9     Recent Labs     01/09/21  0532 01/10/21  0611 01/11/21  0611   AST 15 18 18   ALT 17 14 15   BILITOT 0.3 0.5 <0.2   ALKPHOS 68 96 96     Recent Labs     01/09/21  0532 01/10/21  0611 01/11/21  0611   INR 1.02 1.14 1.14     No results for input(s): [place] in the last 72 hours. Urinalysis:      Lab Results   Component Value Date    NITRU Negative 01/10/2021    WBCUA 153 01/10/2021    BACTERIA 4+ 01/10/2021    RBCUA 34 01/10/2021    BLOODU LARGE 01/10/2021    SPECGRAV 1.021 01/10/2021    GLUCOSEU Negative 01/10/2021       Radiology:  CT CHEST PULMONARY EMBOLISM W CONTRAST   Final Result   No evidence of pulmonary embolism. Mildly dilated and atherosclerotic thoracic aorta with no aneurysm or   dissection. Extensive ground-glass opacities throughout both lungs some and subsegmental   bibasilar opacities which could represent extensive multisegmental   bronchopneumonia and/or pulmonary edema vs pulmonary hemorrhage. Small left pleural effusion. Small lymph nodes scattered in the mediastinum which are not pathologic by   size criteria.       Moderate throughout both lungs, small left pleural effusion, and a 9 mm hypodensity left lobe of the liver that could represent a cyst  -ID consulted, recs appreciated    Liver lesion  - consider CT Ab/Pelvis or abdominal ultrasound     Acute respiratory failure with hypoxia -   - improved, now on vapotherm at 30L this morning   - consulted pulmonology     HTN - elevated  - c/w bb, acei  - resume home norvasc    Hx breast cancer  - c/w arimidex    HLD  - c/w statin    Hypothroidism  - c/w levothyroxine     DMT2  - hold metformin  - ssi, monitor on steroids  - a1c - 6.0    Depression  - c/w home meds    Constipation  - received Dulcolax suppository and enema  - continue Miralax  - add Senna nightly    DVT Prophylaxis: lovenox bid  Diet: DIET LOW SODIUM 2 GM; Carb Control: 4 carb choices (60 gms)/meal  Code Status: Full Code      Dispo - continue care. Prognosis guarded but may be clinically improving slightly.     Mattie Dove MD

## 2021-01-11 NOTE — CONSULTS
Infectious Diseases Inpatient Consult Note      Reason for Consult:  Sepsis and fevers, COVID 19 infection    Requesting Physician:        Primary Care Physician:  Laura Orozco MD    History Obtained From:  Epic and patient     CHIEF COMPLAINT:     Chief Complaint   Patient presents with    Shortness of Breath     c/o sore throat, cough, and right ear feels full x 2 days. Patient also c/o SOB. Patient currently eating cheeseburger. No sob noted. Patient 88% r/a          HISTORY OF PRESENT ILLNESS:  63 y.o. woman with a past history of breast cancer, depression, hypertension, obesity, scoliosis, thyroid disease surgical history include hysterectomy breast biopsy back surgery admitted to [facility] on 12/29/2020 with cough shortness of breath diagnosed with COVID-19 pneumonia. She was in acute hypoxic respiratory failure requiring high heated flow oxygenation. She completed a course of IV remdesivir dexamethasone and also received convalescent plasma during this hospitalization. She now developed a WBC elevation with elevated procalcitonin, hence blood culture and urine culture were obtained. Blood cultures from 1/10/2021 no isolated Klebsiella pneumonia as well as urine culture positive for Klebsiella pneumonia. She was started on IV antibiotics given the ongoing sepsis with WBC elevation we are consulted for recommendations. She is still currently receiving 15 L of heated high flow nasal cannula for COVID-19 pneumonia. CT chest from 1/10/2021 still indicates ongoing extensive groundglass opacities in both lung consistent with COVID-19 infection.         Past Medical History:    Past Medical History:   Diagnosis Date    Arthritis     Breast cancer ([address]     Depression     Hypertension     Obesity     Peptic ulcer disease     Scoliosis     Skin cancer     basal cell    Thyroid disease        Past Surgical History:    Past Surgical History:   Procedure Laterality Date    BACK SURGERY      BREAST BIOPSY      BREAST LUMPECTOMY      CARPAL TUNNEL RELEASE      DILATION AND CURETTAGE OF UTERUS      HYSTERECTOMY      OVARY REMOVAL      THYROID SURGERY         Current Medications:    Outpatient Medications Marked as Taking for the 12/28/20 encounter [facility] Encounter)   Medication Sig Dispense Refill    lisinopril (PRINIVIL;ZESTRIL) 30 MG tablet Take 30 mg by mouth daily      metoprolol succinate (TOPROL XL) 100 MG extended release tablet Take 100 mg by mouth daily      amLODIPine (NORVASC) 5 MG tablet Take 5 mg by mouth daily      dicyclomine (BENTYL) 20 MG tablet Take 20 mg by mouth 3 times daily as needed      tiZANidine (ZANAFLEX) 2 MG tablet Take 4 mg by mouth nightly      anastrozole (ARIMIDEX) 1 MG tablet TAKE 1 TABLET BY MOUTH DAILY 30 tablet 5    alendronate (FOSAMAX) 70 MG tablet Take 1 tablet by mouth every 7 days 4 tablet 3    Fexofenadine HCl (ALLEGRA PO) Take 1 tablet by mouth daily as needed       Calcium Carbonate-Vit D-Min (CALCIUM 1200 PO) Take by mouth Daily       Multiple Vitamins-Minerals (CENTRUM PO) Take by mouth      simvastatin (ZOCOR) 40 MG tablet Take 40 mg by mouth nightly      levothyroxine (LEVOTHROID) 75 MCG tablet Take 75 mcg by mouth Daily      DULoxetine (CYMBALTA) 60 MG extended release capsule Take 60 mg by mouth daily      metFORMIN (GLUCOPHAGE) 500 MG tablet Take 1,000 mg by mouth 2 times daily (with meals)       aspirin 81 MG tablet Take 81 mg by mouth daily      naproxen (NAPROSYN) 375 MG tablet Take 375 mg by mouth as needed       clobetasol (TEMOVATE) 0.05 % ointment Apply topically once a week Apply topically 2 times daily.          Allergies:  Mercury    Immunizations :   Immunization History   Administered Date(s) Administered    Pneumococcal Conjugate 13-valent Lima Burton) 11/23/2015         Social History:    Social History     Tobacco Use    Smoking status: Never Smoker    Smokeless tobacco: Never Used   Substance Use Topics    Alcohol use: Not Currently    Drug use: Never     Social History     Tobacco Use   Smoking Status Never Smoker   Smokeless Tobacco Never Used      Family History : no DVT no COPD       REVIEW OF SYSTEMS:      Constitutional:    fevers + , chills, night sweats  Eyes:  negative for blurred vision, eye discharge, visual disturbance   HEENT:  negative for hearing loss, ear drainage,nasal congestion  Respiratory:  + cough, shortness of breath +  or hemoptysis   Cardiovascular:  negative for chest pain, palpitations, syncope  Gastrointestinal:  negative for nausea, vomiting, diarrhea, constipation, abdominal pain  Genitourinary:  negative for frequency, dysuria, urinary incontinence, hematuria  Hematologic/Lymphatic:  negative for easy bruising, bleeding and lymphadenopathy  Allergic/Immunologic:  negative for recurrent infections, angioedema, anaphylaxis   Endocrine:  negative for weight changes, polyuria, polydipsia and polyphagia  Musculoskeletal:  negative for joint  pain, swelling, decreased range of motion  Integumentary: No rashes, skin lesions  Neurological:  negative for headaches, slurred speech, unilateral weakness  Psychiatric: negative for hallucinations,confusion,agitation.      PHYSICAL EXAM:      Vitals:    BP 98/64   Pulse 116   Temp 98.3 °F (36.8 °C)   Resp 22   Ht 5' 6\" (1.676 m)   Wt 181 lb (82.1 kg)   SpO2 91%   BMI 29.21 kg/m²     General Appearance: alert,in  acute distress, +  pallor, no icterus remains on high flow nasal cannula  Skin: warm and dry, no rash or erythema  Head: normocephalic and atraumatic  Eyes: pupils equal, round, and reactive to light, conjunctivae normal  ENT: tympanic membrane, external ear and ear canal normal bilaterally, nose without deformity, nasal mucosa and turbinates normal without polyps  Neck: supple and non-tender without mass, no thyromegaly  no cervical lymphadenopathy  Pulmonary/Chest: Bi basal crepts  wheezes, rales or rhonchi, normal air movement, no respiratory distress  Cardiovascular: normal rate, regular rhythm, normal S1 and S2, no murmurs, rubs, clicks, or gallops, no carotid bruits  Abdomen: soft, non-tender, non-distended, normal bowel sounds, no masses or organomegaly  Extremities: no cyanosis, clubbing or edema  Musculoskeletal: normal range of motion, no joint swelling, deformity or tenderness  Integumentary: No rashes, no abnormal skin lesions, no petechiae  Neurologic: reflexes normal and symmetric, no cranial nerve deficit  Psych:  Orientation, sensorium, mood normal   Lines:IV     DATA:    CBC:   Lab Results   Component Value Date    WBC 17.8 (H) 01/11/2021    HGB 11.8 (L) 01/11/2021    HCT 36.3 01/11/2021    MCV 92.2 01/11/2021     01/11/2021     RENAL:   Lab Results   Component Value Date    CREATININE 1.0 01/11/2021    BUN 32 (H) 01/11/2021     01/11/2021    K 4.0 01/11/2021     01/11/2021    CO2 27 01/11/2021     SED RATE: No results found for: SEDRATE  CK: No results found for: CKTOTAL  CRP:   Lab Results   Component Value Date    .1 01/10/2021     Hepatic Function Panel:   Lab Results   Component Value Date    ALKPHOS 96 01/11/2021    ALT 15 01/11/2021    AST 18 01/11/2021    PROT 5.5 01/11/2021    BILITOT <0.2 01/11/2021    LABALBU 2.2 01/11/2021     UA:  Lab Results   Component Value Date    COLORU DK YELLOW 01/10/2021    CLARITYU TURBID 01/10/2021    GLUCOSEU Negative 01/10/2021    BILIRUBINUR SMALL 01/10/2021    KETUA Negative 01/10/2021    SPECGRAV 1.021 01/10/2021    BLOODU LARGE 01/10/2021    PHUR 5.5 01/10/2021    PROTEINU 100 01/10/2021    UROBILINOGEN 0.2 01/10/2021    NITRU Negative 01/10/2021    LEUKOCYTESUR MODERATE 01/10/2021    LABMICR YES 01/10/2021    URINETYPE NotGiven 01/10/2021      Urine Microscopic:   Lab Results   Component Value Date    BACTERIA 4+ 01/10/2021    COMU see below 01/10/2021    WBCUA 153 01/10/2021    RBCUA 34 01/10/2021    EPIU 1 01/10/2021     Urine Reflex to Culture: No results found for: URRFLXCULT    Lactic acid 2.2     Procal  36.13     WBC  33.1     crp  133.1     D dimer  3995     MICRO: cultures reviewed and updated by me          Culture, Blood 1 [[id number]] (Abnormal) Collected: 01/10/21 1310   Order Status: Completed Specimen: Blood Updated: 01/11/21 0923    Blood Culture, Routine --Abnormal     Gram stain Aerobic bottle:   Gram negative rods   Information to follow   Gram stain Anaerobic bottle:   Gram negative rods   Information to follow   Abnormal     Organism Klebsiella pneumoniae DNA DetectedAbnormal     Blood Culture, Routine See additional report for complete BCID panel. Narrative:     ORDER#: [id number]                          ORDERED BY: Ronak Johnston   SOURCE: Blood                              COLLECTED:  01/10/21 13:10   ANTIBIOTICS AT [facility]. :                      RECEIVED :  01/10/21 13:17   CALL  Odom  [id number] Saumya Malik [phone], [phone]   Microbiology results called to and read back by pharmacist Carlynn Goldmann,   01/11/2021 07:07, by [facility]   Microbiology results called to and read back by DUY Aguilar, 01/11/2021   07:07, by [facility]   If child <=2 yrs old please draw pediatric bottle. ~Blood Culture 1   Performed at:   [facility]   [address] [address]   Phone [phone]   Culture, Blood 2 [[id number]] (Abnormal) Collected: 01/10/21 1541   Order Status: Completed Specimen: Blood Updated: 01/11/21 0811    Culture, Blood 2 --Abnormal     Gram stain Aerobic bottle:   Gram negative rods   Information to follow   Gram stain Anaerobic bottle:   Gram negative rods   Information to follow   Abnormal    Narrative:     ORDER#: [id number]                          ORDERED BY: Ronak Johnston   SOURCE: Blood                              COLLECTED:  01/10/21 15:41   ANTIBIOTICS AT [facility] :                      RECEIVED :  01/10/21 15:41   CALL  Odom  [id number] tel. [phone],   Previous panic on this admission - call not needed per SOP, 01/11/2021 07:03,   by [facility]   If child <=2 yrs old please draw pediatric bottle. ~Blood Culture #2   Performed at:   [facility]   [address] [facility] [facility] [place] [address]   Phone [phone]   Culture, Blood, PCR ID Panel Results Report [[id number]] Collected: 01/10/21 1310   Order Status: Completed Updated: 01/11/21 0709    Report SEE IMAGE   Narrative:     CALL Rosey Roland [phone],  Otfkristoferjeramy Cifuentes   Microbiology results called to and read back by pharmacist Hemant Potter,   01/11/2021 07:07, by [facility]   Microbiology results called to and read back by DUY Hassan, 01/11/2021   07:07, by [facility]   Referred out by:   [facility]   [address], [place] [address]   Phone [phone]   MRSA DNA Probe, Nasal [[id number]] Collected: 01/10/21 1400   Order Status: Sent Specimen: Nares Updated: 01/10/21 1405   Culture, Urine [[id number]] Collected: 01/10/21 1130   Order Status: Sent Specimen: Urine, clean catch Updated: 01/10/21 1149   Strep Pneumoniae Antigen [[id number]] Collected: 01/10/21 1130   Order Status: Sent Specimen: Urine, clean catch Updated: 01/10/21 1149   Legionella Antigen, Urine [[id number]] Collected: 01/10/21 1130   Order Status: Sent Specimen: Urine, clean catch Updated: 01/10/21 1149   Culture, Respiratory [[id number]]    Order Status: No result Specimen: Sputum Expectorated    Culture, Blood 1 [[id number]] Collected: 12/28/20 2140   Order Status: Completed Specimen: Blood Updated: 01/02/21 1415    Blood Culture, Routine No Growth after 4 days of incubation. Narrative:     ORDER#: [id number]                          ORDERED BY: Regulo Seranalilia   SOURCE: Blood Antecubital-Lef              COLLECTED:  12/28/20 21:40   ANTIBIOTICS AT [facility] :                      RECEIVED :  12/29/20 13:55   If child <=2 yrs old please draw pediatric bottle. ~Blood Culture #1   Performed at:   [address] [facility]   [address] [facility] [address] [facility], [place] [address]   Phone [phone]   Culture, Blood 2 [[id number]] Collected: 12/28/20 2215   Order Status: Completed Specimen: Blood Updated: 01/02/21 1415    Culture, Blood 2 No Growth after 4 days of incubation. Narrative:     ORDER#: [id number]                          ORDERED BY: Gabriel Swanson   SOURCE: Blood Antecubital-Rig              COLLECTED:  12/28/20 22:15   ANTIBIOTICS AT [facility] :                      RECEIVED :  12/29/20 13:55   If child <=2 yrs old please draw pediatric bottle. ~Blood Culture #2   Performed at:   [facility]   [address] [address] [facility], [place] [address]   Phone [phone]   Legionella antigen, urine [[id number]] Collected: 12/29/20 0431   Order Status: Completed Specimen: Urine, clean catch Updated: 12/29/20 1029    L. pneumophila Serogp 1 Ur Ag --    Presumptive Negative   No Legionella pneumophila serogroup 1 antigens detected. A negative result does not exclude infection with   Legionella pneumophila serogroup 1 nor does it rule out   other microbial-caused respiratory infections or   disease caused by other serogroups of   Legionella pneumophila. Normal Range: Presumptive Negative    Narrative:     ORDER#: [id number]                          ORDERED BY: Luc Walters   SOURCE: Urine Clean Catch                  COLLECTED:  12/29/20 04:31   ANTIBIOTICS AT [facility]. :                      RECEIVED :  12/29/20 04:31   Performed at:   [facility]   [address] [facility] [address] [facility], [place] [address]   Phone [phone]   Strep Pneumoniae Antigen [[id number]] Collected: 12/29/20 0431   Order Status: Completed Specimen: Urine, clean catch Updated: 12/29/20 1019    STREP PNEUMONIAE ANTIGEN, URINE --    Presumptive Negative   Presumptive negative suggests no current or recent   pneumococcal infection.  Infection due to Strep pneumoniae   cannot be ruled out since the antigen present in the sample   may be below the detection limit of the test.   Normal Range:Presumptive Negative    Narrative:     ORDER#: [id number]                          ORDERED BY: Kelly Hernandez   SOURCE: Urine Clean Catch                  COLLECTED:  12/29/20 04:31   ANTIBIOTICS AT [facility] :                      RECEIVED :  12/29/20 04:31   Performed at:   [facility]   [address] [place] [address] [employer], [place] [address]   Phone [phone]   Culture, Respiratory, Sputum [[id number]]    Order Status: Canceled Specimen: Sputum Expectorated    Culture, Respiratory [[id number]]    Order Status: Canceled Specimen: Sputum Expectorated          Blood Culture:   Lab Results   Component Value Date    [facility]  01/10/2021     Gram stain Aerobic bottle:  Gram negative rods  Information to follow  Gram stain Anaerobic bottle:  Gram negative rods  Information to follow      [facility] See additional report for complete BCID panel. 01/10/2021    BLOODCULT2  01/10/2021     Gram stain Aerobic bottle:  Gram negative rods  Information to follow  Gram stain Anaerobic bottle:  Gram negative rods  Information to follow         Viral Culture:    Lab Results   Component Value Date    COVID19 DETECTED 12/28/2020     Urine Culture: No results for input(s): Cirilo Atwood in the last 72 hours.     Scheduled Meds:   vancomycin  1,000 mg Intravenous Q18H    lidocaine 1 % injection  5 mL Intradermal Once    sodium chloride flush  10 mL Intravenous 2 times per day    cefepime  2 g Intravenous Q12H    vancomycin (VANCOCIN) intermittent dosing (placeholder)   Other RX Placeholder    senna  1 tablet Oral Nightly    polyethylene glycol  17 g Oral Daily    budesonide-formoterol  2 puff Inhalation BID    enoxaparin  40 mg Subcutaneous BID    [Held by provider] amLODIPine  5 mg Oral Daily    [Held by provider] lisinopril  30 mg Oral Daily    [Held by provider] metoprolol tartrate  100 mg Oral BID    sodium chloride flush  10 mL Intravenous 2 times per day    aspirin  81 mg Oral Daily    levothyroxine  75 mcg Oral Daily    atorvastatin  40 mg Oral Daily    insulin lispro  0-6 Units Subcutaneous TID WC    insulin lispro  0-3 Units Subcutaneous Nightly    Vitamin D  2,000 Units Oral Daily    anastrozole  1 mg Oral Daily    DULoxetine  60 mg Oral Daily       Continuous Infusions:   sodium chloride 100 mL/hr at 01/10/21 2338    sodium chloride      dextrose         PRN Meds:  sodium chloride flush, LORazepam, albuterol sulfate HFA **AND** ipratropium **AND** MDI Treatment, sodium chloride, sodium chloride flush, acetaminophen **OR** acetaminophen, glucose, dextrose, glucagon (rDNA), dextrose, ondansetron, guaiFENesin-dextromethorphan, sodium chloride    Imaging:   CT CHEST PULMONARY EMBOLISM W CONTRAST   Final Result   No evidence of pulmonary embolism. Mildly dilated and atherosclerotic thoracic aorta with no aneurysm or   dissection. Extensive ground-glass opacities throughout both lungs some and subsegmental   bibasilar opacities which could represent extensive multisegmental   bronchopneumonia and/or pulmonary edema vs pulmonary hemorrhage. Small left pleural effusion. Small lymph nodes scattered in the mediastinum which are not pathologic by   size criteria. Moderate degenerative changes and postop changes in the spine      9 mm hypodensity left lobe liver which could represent a cyst but is   ill-defined. Suggest ultrasound correlation. XR CHEST PORTABLE   Final Result   Bilateral ill-defined airspace opacities could represent atypical pneumonia,   multifocal bacterial pneumonia or subsegmental atelectasis             All pertinent images and reports for the current Hospitalization were reviewed by me.     IMPRESSION:    Patient Active Problem List   Diagnosis    Malignant neoplasm of central portion of right female breast ([address]    Encounter for consultation    Encounter for antineoplastic radiation therapy    ER+ (estrogen receptor positive status)    HER2-negative carcinoma of breast (HCC)    Use of anastrozole (Arimidex)    Visit for monitoring Arimidex therapy    Osteopenia    Pneumonia due to organism    Suspected COVID-19 virus infection    Hypothyroidism    HLD (hyperlipidemia)    PCOS (polycystic ovarian syndrome)    Acute respiratory failure with hypoxia (HCC)    2019 novel coronavirus-infected pneumonia (NCIP)    Elevated LDH    Elevated AST (SGOT)    Elevated ferritin    Elevated d-dimer    History of depression    Essential hypertension    Class 2 obesity due to excess calories with body mass index (BMI) of 35.0 to 35.9 in adult    Pneumonia due to COVID-19 virus    Hyperglycemia    Leukocytosis     Sepsis  Klebsiella pneumonia urinary tract infection  Urinalysis very abnormal from 1/10/2021  Klebsiella pneumoniae bacteremia  WBC elevation  COVID-19 pneumonia  Acute hypoxic respiratory failure  CT chest with bilateral multifocal pneumonia  Elevated D-dimer elevated ferritin  History of breast cancer  History of diabetes  BMI 29.36  Elevated procalcitonin  Lactic acidosis   Elevated CRP     She continues to require high flow oxygen supplementation for acute hypoxic respiratory failure from COVID-19 pneumonia. Unfortunately she now developed sepsis secondary to urinary tract infection with Klebsiella in the urine as well as in the bloodstream.  Will await sensitivities on the culture to adjust antibiotic therapy. Agree with IV antibiotics at this time. She completed a course of remdesivir dexamethasone and plasma for her COVID-19 pneumonia. Unfortunately  CT scan of the chest with  still ongoing changes secondary to COVID-19. Labs, Microbiology, Radiology and pertinent results from current hospitalization and care every where were reviewed by me as a part of the consultation. PLAN :  1. Cont IV Cefepime x 2 gm q 12 hrs  2. Follow up on Blood cx  3.  Repeat Blood cx in AM   4. Cont supportive care  5. She completed the Treatment course for COVID and now on supportive care with Oxygen supplementation  6. CRP, D dimer elevation now likely from sepsis      Discussed with patient/Family and Nursing     Thanks for allowing me to participate in your patient's care please call me with any questions or concerns.     Dr. Yesi Nichole MD  [address] [place] [address] Physician  Phone: [phone]   Fax : [phone]

## 2021-01-11 NOTE — PLAN OF CARE
Problem: Pain:  Goal: Pain level will decrease  Description: Pain level will decrease  Outcome: Ongoing  Goal: Control of acute pain  Description: Control of acute pain  Outcome: Ongoing  Goal: Control of chronic pain  Description: Control of chronic pain  Outcome: Ongoing     Problem: Airway Clearance - Ineffective  Goal: Achieve or maintain patent airway  Outcome: Ongoing     Problem: Gas Exchange - Impaired  Goal: Absence of hypoxia  Outcome: Ongoing  Goal: Promote optimal lung function  Outcome: Ongoing     Problem: Breathing Pattern - Ineffective  Goal: Ability to achieve and maintain a regular respiratory rate  Outcome: Ongoing     Problem:  Body Temperature -  Risk of, Imbalanced  Goal: Ability to maintain a body temperature within defined limits  Outcome: Ongoing  Goal: Will regain or maintain usual level of consciousness  Outcome: Ongoing  Goal: Complications related to the disease process, condition or treatment will be avoided or minimized  Outcome: Ongoing     Problem: Isolation Precautions - Risk of Spread of Infection  Goal: Prevent transmission of infection  Outcome: Ongoing     Problem: Nutrition Deficits  Goal: Optimize nutrtional status  Outcome: Ongoing     Problem: Risk for Fluid Volume Deficit  Goal: Maintain normal heart rhythm  Outcome: Ongoing  Goal: Maintain absence of muscle cramping  Outcome: Ongoing  Goal: Maintain normal serum potassium, sodium, calcium, phosphorus, and pH  Outcome: Ongoing     Problem: Loneliness or Risk for Loneliness  Goal: Demonstrate positive use of time alone when socialization is not possible  Outcome: Ongoing     Problem: Fatigue  Goal: Verbalize increase energy and improved vitality  Outcome: Ongoing     Problem: Patient Education: Go to Patient Education Activity  Goal: Patient/Family Education  Outcome: Ongoing     Problem: Falls - Risk of:  Goal: Will remain free from falls  Description: Will remain free from falls  Outcome: Ongoing  Goal: Absence of physical injury  Description: Absence of physical injury  Outcome: Ongoing     Problem: Nutrition  Goal: Optimal nutrition therapy  Outcome: Ongoing     Problem: Skin Integrity:  Goal: Will show no infection signs and symptoms  Description: Will show no infection signs and symptoms  Outcome: Ongoing  Goal: Absence of new skin breakdown  Description: Absence of new skin breakdown  Outcome: Ongoing

## 2021-01-11 NOTE — PROGRESS NOTES
Clinical Pharmacy Note  Vancomycin Consult    Mouna Davis is a 63 y.o. female ordered Vancomycin for PNA; consult received from Dr. Dominik Dhaliwal to manage therapy. Also receiving cefepime. Patient Active Problem List   Diagnosis    Malignant neoplasm of central portion of right female breast ([facility]    Encounter for consultation    Encounter for antineoplastic radiation therapy    ER+ (estrogen receptor positive status)    HER2-negative carcinoma of breast ([facility]    Use of anastrozole (Arimidex)    Visit for monitoring Arimidex therapy    Osteopenia    Pneumonia due to organism    Suspected COVID-19 virus infection    Hypothyroidism    HLD (hyperlipidemia)    PCOS (polycystic ovarian syndrome)    Acute respiratory failure with hypoxia ([facility]    2019 novel coronavirus-infected pneumonia ([facility])    Elevated LDH    Elevated AST (SGOT)    Elevated ferritin    Elevated d-dimer    History of depression    Essential hypertension    Class 2 obesity due to excess calories with body mass index (BMI) of 35.0 to 35.9 in adult    Pneumonia due to COVID-19 virus    Hyperglycemia    Leukocytosis       Allergies:  Mercury     Temp max:  Temp (24hrs), Av.7 °F (37.1 °C), Min:97.9 °F (36.6 °C), Max:99.3 °F (37.4 °C)      Recent Labs     21  1000 21  0532 01/10/21  1154   WBC 22.9* 12.1* 33.1*       Recent Labs     21  0532 01/10/21  0611 21  0611   BUN 29* 35* 32*   CREATININE 0.6 1.2 1.0         Intake/Output Summary (Last 24 hours) at 2021 6618  Last data filed at 2021 0559  Gross per 24 hour   Intake 2775 ml   Output 550 ml   Net 2225 ml       Culture Results:  pending    Ht Readings from Last 1 Encounters:   21 5' 6\" (1.676 m)        Wt Readings from Last 1 Encounters:   21 181 lb (82.1 kg)         Estimated Creatinine Clearance: 62 mL/min (based on SCr of 1 mg/dL).     Assessment/Plan:    Vancomycin day # 2  Vancomycin random level 9.7 mg/L this morning. Patient with ELIDIA, SCr improving some today to 1.0. Vancomycin 1000 mg every 18 hours ordered. Level ordered 11/12/21 at 1900. Thank you for the consult.      Mireya Harding, PharmD, 1/11/2021 7:13 AM

## 2021-01-12 ENCOUNTER — APPOINTMENT (OUTPATIENT)
Dept: GENERAL RADIOLOGY | Age: 66
DRG: 177 | End: 2021-01-12
Payer: MEDICARE

## 2021-01-12 LAB
A/G RATIO: 0.5 (ref 1.1–2.2)
ALBUMIN SERPL-MCNC: 1.7 G/DL (ref 3.4–5)
ALP BLD-CCNC: 95 U/L (ref 40–129)
ALT SERPL-CCNC: 15 U/L (ref 10–40)
ANION GAP SERPL CALCULATED.3IONS-SCNC: 9 MMOL/L (ref 3–16)
AST SERPL-CCNC: 19 U/L (ref 15–37)
BASOPHILS ABSOLUTE: 0 K/UL (ref 0–0.2)
BASOPHILS RELATIVE PERCENT: 0.3 %
BILIRUB SERPL-MCNC: 0.3 MG/DL (ref 0–1)
BUN BLDV-MCNC: 23 MG/DL (ref 7–20)
C-REACTIVE PROTEIN: 176.8 MG/L (ref 0–5.1)
CALCIUM SERPL-MCNC: 8.1 MG/DL (ref 8.3–10.6)
CHLORIDE BLD-SCNC: 111 MMOL/L (ref 99–110)
CO2: 22 MMOL/L (ref 21–32)
CREAT SERPL-MCNC: 0.8 MG/DL (ref 0.6–1.2)
D DIMER: 3767 NG/ML DDU (ref 0–229)
EOSINOPHILS ABSOLUTE: 0.1 K/UL (ref 0–0.6)
EOSINOPHILS RELATIVE PERCENT: 0.5 %
GFR AFRICAN AMERICAN: >60
GFR NON-AFRICAN AMERICAN: >60
GLOBULIN: 3.3 G/DL
GLUCOSE BLD-MCNC: 105 MG/DL (ref 70–99)
GLUCOSE BLD-MCNC: 127 MG/DL (ref 70–99)
GLUCOSE BLD-MCNC: 135 MG/DL (ref 70–99)
GLUCOSE BLD-MCNC: 160 MG/DL (ref 70–99)
GLUCOSE BLD-MCNC: 90 MG/DL (ref 70–99)
HCT VFR BLD CALC: 30.1 % (ref 36–48)
HEMOGLOBIN: 9.6 G/DL (ref 12–16)
LYMPHOCYTES ABSOLUTE: 0.4 K/UL (ref 1–5.1)
LYMPHOCYTES RELATIVE PERCENT: 3.4 %
MAGNESIUM: 1.8 MG/DL (ref 1.8–2.4)
MCH RBC QN AUTO: 29.9 PG (ref 26–34)
MCHC RBC AUTO-ENTMCNC: 32 G/DL (ref 31–36)
MCV RBC AUTO: 93.4 FL (ref 80–100)
MONOCYTES ABSOLUTE: 0.6 K/UL (ref 0–1.3)
MONOCYTES RELATIVE PERCENT: 4.5 %
NEUTROPHILS ABSOLUTE: 11.3 K/UL (ref 1.7–7.7)
NEUTROPHILS RELATIVE PERCENT: 91.3 %
ORGANISM: ABNORMAL
PDW BLD-RTO: 14.7 % (ref 12.4–15.4)
PERFORMED ON: ABNORMAL
PLATELET # BLD: 188 K/UL (ref 135–450)
PMV BLD AUTO: 9.9 FL (ref 5–10.5)
POTASSIUM REFLEX MAGNESIUM: 3.5 MMOL/L (ref 3.5–5.1)
PROCALCITONIN: 9.52 NG/ML (ref 0–0.15)
RBC # BLD: 3.23 M/UL (ref 4–5.2)
SODIUM BLD-SCNC: 142 MMOL/L (ref 136–145)
TOTAL PROTEIN: 5 G/DL (ref 6.4–8.2)
URINE CULTURE, ROUTINE: ABNORMAL
WBC # BLD: 12.3 K/UL (ref 4–11)

## 2021-01-12 PROCEDURE — 99233 SBSQ HOSP IP/OBS HIGH 50: CPT | Performed by: INTERNAL MEDICINE

## 2021-01-12 PROCEDURE — 36415 COLL VENOUS BLD VENIPUNCTURE: CPT

## 2021-01-12 PROCEDURE — 2580000003 HC RX 258: Performed by: STUDENT IN AN ORGANIZED HEALTH CARE EDUCATION/TRAINING PROGRAM

## 2021-01-12 PROCEDURE — 6370000000 HC RX 637 (ALT 250 FOR IP): Performed by: INTERNAL MEDICINE

## 2021-01-12 PROCEDURE — 86140 C-REACTIVE PROTEIN: CPT

## 2021-01-12 PROCEDURE — 6360000002 HC RX W HCPCS: Performed by: STUDENT IN AN ORGANIZED HEALTH CARE EDUCATION/TRAINING PROGRAM

## 2021-01-12 PROCEDURE — 6370000000 HC RX 637 (ALT 250 FOR IP): Performed by: FAMILY MEDICINE

## 2021-01-12 PROCEDURE — 85025 COMPLETE CBC W/AUTO DIFF WBC: CPT

## 2021-01-12 PROCEDURE — 6360000002 HC RX W HCPCS: Performed by: INTERNAL MEDICINE

## 2021-01-12 PROCEDURE — 6370000000 HC RX 637 (ALT 250 FOR IP): Performed by: STUDENT IN AN ORGANIZED HEALTH CARE EDUCATION/TRAINING PROGRAM

## 2021-01-12 PROCEDURE — 94761 N-INVAS EAR/PLS OXIMETRY MLT: CPT

## 2021-01-12 PROCEDURE — 2700000000 HC OXYGEN THERAPY PER DAY

## 2021-01-12 PROCEDURE — 2060000000 HC ICU INTERMEDIATE R&B

## 2021-01-12 PROCEDURE — 80053 COMPREHEN METABOLIC PANEL: CPT

## 2021-01-12 PROCEDURE — 85379 FIBRIN DEGRADATION QUANT: CPT

## 2021-01-12 PROCEDURE — 2580000003 HC RX 258: Performed by: INTERNAL MEDICINE

## 2021-01-12 PROCEDURE — 84145 PROCALCITONIN (PCT): CPT

## 2021-01-12 PROCEDURE — 83735 ASSAY OF MAGNESIUM: CPT

## 2021-01-12 PROCEDURE — 71045 X-RAY EXAM CHEST 1 VIEW: CPT

## 2021-01-12 RX ORDER — FLUCONAZOLE 100 MG/1
100 TABLET ORAL DAILY
Status: COMPLETED | OUTPATIENT
Start: 2021-01-12 | End: 2021-01-18

## 2021-01-12 RX ORDER — PREDNISONE 20 MG/1
60 TABLET ORAL DAILY
Status: DISCONTINUED | OUTPATIENT
Start: 2021-01-12 | End: 2021-01-19

## 2021-01-12 RX ADMIN — FLUCONAZOLE 100 MG: 100 TABLET ORAL at 19:02

## 2021-01-12 RX ADMIN — PREDNISONE 60 MG: 20 TABLET ORAL at 11:33

## 2021-01-12 RX ADMIN — DULOXETINE HYDROCHLORIDE 60 MG: 60 CAPSULE, DELAYED RELEASE ORAL at 11:34

## 2021-01-12 RX ADMIN — ASPIRIN 81 MG 81 MG: 81 TABLET ORAL at 11:34

## 2021-01-12 RX ADMIN — LEVOTHYROXINE SODIUM 75 MCG: 0.07 TABLET ORAL at 07:01

## 2021-01-12 RX ADMIN — ACETAMINOPHEN 650 MG: 325 TABLET ORAL at 19:02

## 2021-01-12 RX ADMIN — ANASTROZOLE 1 MG: 1 TABLET ORAL at 11:34

## 2021-01-12 RX ADMIN — ATORVASTATIN CALCIUM 40 MG: 40 TABLET, FILM COATED ORAL at 11:34

## 2021-01-12 RX ADMIN — STANDARDIZED SENNA CONCENTRATE 8.6 MG: 8.6 TABLET ORAL at 21:45

## 2021-01-12 RX ADMIN — CEFEPIME HYDROCHLORIDE 2 G: 2 INJECTION, POWDER, FOR SOLUTION INTRAVENOUS at 01:40

## 2021-01-12 RX ADMIN — ENOXAPARIN SODIUM 40 MG: 40 INJECTION SUBCUTANEOUS at 11:34

## 2021-01-12 RX ADMIN — ACETAMINOPHEN 650 MG: 325 TABLET ORAL at 01:40

## 2021-01-12 RX ADMIN — GUAIFENESIN AND DEXTROMETHORPHAN 5 ML: 100; 10 SYRUP ORAL at 19:03

## 2021-01-12 RX ADMIN — Medication 10 ML: at 21:45

## 2021-01-12 RX ADMIN — ONDANSETRON 4 MG: 2 INJECTION INTRAMUSCULAR; INTRAVENOUS at 09:03

## 2021-01-12 RX ADMIN — SODIUM CHLORIDE, PRESERVATIVE FREE 10 ML: 5 INJECTION INTRAVENOUS at 21:47

## 2021-01-12 RX ADMIN — Medication 2 PUFF: at 08:22

## 2021-01-12 RX ADMIN — ENOXAPARIN SODIUM 40 MG: 40 INJECTION SUBCUTANEOUS at 21:45

## 2021-01-12 RX ADMIN — POLYETHYLENE GLYCOL 3350 17 G: 17 POWDER, FOR SOLUTION ORAL at 11:34

## 2021-01-12 RX ADMIN — GUAIFENESIN AND DEXTROMETHORPHAN 5 ML: 100; 10 SYRUP ORAL at 12:39

## 2021-01-12 RX ADMIN — SODIUM CHLORIDE: 9 INJECTION, SOLUTION INTRAVENOUS at 07:01

## 2021-01-12 RX ADMIN — CEFEPIME HYDROCHLORIDE 2 G: 2 INJECTION, POWDER, FOR SOLUTION INTRAVENOUS at 12:39

## 2021-01-12 RX ADMIN — INSULIN LISPRO 1 UNITS: 100 INJECTION, SOLUTION INTRAVENOUS; SUBCUTANEOUS at 18:00

## 2021-01-12 RX ADMIN — Medication 2 PUFF: at 21:00

## 2021-01-12 ASSESSMENT — PAIN SCALES - GENERAL
PAINLEVEL_OUTOF10: 3
PAINLEVEL_OUTOF10: 0
PAINLEVEL_OUTOF10: 0

## 2021-01-12 ASSESSMENT — PAIN - FUNCTIONAL ASSESSMENT: PAIN_FUNCTIONAL_ASSESSMENT: ACTIVITIES ARE NOT PREVENTED

## 2021-01-12 ASSESSMENT — PAIN SCALES - WONG BAKER
WONGBAKER_NUMERICALRESPONSE: 0
WONGBAKER_NUMERICALRESPONSE: 0

## 2021-01-12 ASSESSMENT — PAIN DESCRIPTION - PAIN TYPE: TYPE: ACUTE PAIN

## 2021-01-12 ASSESSMENT — PAIN DESCRIPTION - ONSET: ONSET: ON-GOING

## 2021-01-12 ASSESSMENT — PAIN DESCRIPTION - LOCATION: LOCATION: HEAD

## 2021-01-12 ASSESSMENT — PAIN DESCRIPTION - PROGRESSION: CLINICAL_PROGRESSION: NOT CHANGED

## 2021-01-12 NOTE — PROGRESS NOTES
Pulmonary Progress Note    Date of Admission: 12/28/2020   LOS: 15 days     Chief Complaint   Patient presents with    Shortness of Breath     c/o sore throat, cough, and right ear feels full x 2 days. Patient also c/o SOB. Patient currently eating cheeseburger. No sob noted. Patient 88% r/a        Assessment:     Acute Hypoxemic Respiratory Failure with SAO2 <90% on Room Air  COVID 19 PNa    Plan:        -Wean supplemental oxygen to goal saturation of >90%  -acute worsening of hypoxia this mrning  -check CXR  -restart steroids  -procal and WBC coming down  -symbicort    24 Hour Events/Subjective  Oxygen requirement up from 15 L to 3 L Vapotherm. Patient states she feels more short of breath. ROS:   No nausea  No Vomiting  No chest pain      Intake/Output Summary (Last 24 hours) at 1/12/2021 1108  Last data filed at 1/12/2021 0559  Gross per 24 hour   Intake 1303 ml   Output 1300 ml   Net 3 ml         PHYSICAL EXAM:   Blood pressure 125/83, pulse 120, temperature 98.6 °F (37 °C), temperature source Oral, resp. rate (!) 34, height 5' 6\" (1.676 m), weight 181 lb 14.1 oz (82.5 kg), SpO2 92 %.'  Gen:  No acute distress. Eyes: PERRL. Anicteric sclera. No conjunctival injection. ENT: No discharge. Posterior oropharynx clear. External appearance of ears and nose normal.  Neck: Trachea midline. No mass   Resp:  No crackles. No wheezes. No rhonchi. No dullness on percussion. CV: Regular rate. Regular rhythm. No murmur or rub. No edema. GI: Soft, Non-tender. Non-distended. +BS  Skin: Warm, dry, w/o erythema. Lymph: No cervical or supraclavicular LAD. M/S: No cyanosis. No clubbing. Neuro:  no focal neurologic deficit. Moves all extremities  Psych: Awake and alert, Oriented x 3. Judgement and insight appropriate. Mood stable.       Medications:    Scheduled Meds:   predniSONE  60 mg Oral Daily    lidocaine 1 % injection  5 mL Intradermal Once    sodium chloride flush  10 mL Intravenous 2 times per day  cefepime  2 g Intravenous Q12H    senna  1 tablet Oral Nightly    polyethylene glycol  17 g Oral Daily    budesonide-formoterol  2 puff Inhalation BID    enoxaparin  40 mg Subcutaneous BID    [Held by provider] amLODIPine  5 mg Oral Daily    [Held by provider] lisinopril  30 mg Oral Daily    [Held by provider] metoprolol tartrate  100 mg Oral BID    sodium chloride flush  10 mL Intravenous 2 times per day    aspirin  81 mg Oral Daily    levothyroxine  75 mcg Oral Daily    atorvastatin  40 mg Oral Daily    insulin lispro  0-6 Units Subcutaneous TID     insulin lispro  0-3 Units Subcutaneous Nightly    anastrozole  1 mg Oral Daily    DULoxetine  60 mg Oral Daily       Continuous Infusions:   sodium chloride 100 mL/hr at 01/12/21 0701    sodium chloride      dextrose         PRN Meds:  sodium chloride flush, LORazepam, albuterol sulfate HFA **AND** ipratropium **AND** MDI Treatment, sodium chloride, sodium chloride flush, acetaminophen **OR** acetaminophen, glucose, dextrose, glucagon (rDNA), dextrose, ondansetron, guaiFENesin-dextromethorphan, sodium chloride    Labs reviewed:  CBC:   Recent Labs     01/10/21  1154 01/11/21  0611 01/12/21  0748   WBC 33.1* 17.8* 12.3*   HGB 11.9* 11.8* 9.6*   HCT 38.1 36.3 30.1*   MCV 93.4 92.2 93.4    207 188     BMP:   Recent Labs     01/10/21  0611 01/11/21  0611 01/12/21  0748    140 142   K 4.2 4.0 3.5    103 111*   CO2 25 27 22   BUN 35* 32* 23*   CREATININE 1.2 1.0 0.8     LIVER PROFILE:   Recent Labs     01/10/21  0611 01/11/21  0611 01/12/21  0748   AST 18 18 19   ALT 14 15 15   BILITOT 0.5 <0.2 0.3   ALKPHOS 96 96 95     PT/INR:   Recent Labs     01/10/21  0611 01/11/21  0611   PROTIME 13.2 13.2   INR 1.14 1.14     APTT:   Recent Labs     01/10/21  0611 01/11/21  0611   APTT 29.9 31.8     UA:  Recent Labs     01/10/21  1130   COLORU DK YELLOW   PHUR 5.5   WBCUA 153*   RBCUA 34*   BACTERIA 4+*   CLARITYU TURBID*   SPECGRAV 1.021 LEUKOCYTESUR MODERATE*   UROBILINOGEN 0.2   BILIRUBINUR SMALL*   BLOODU LARGE*   GLUCOSEU Negative     No results for input(s): PH, PCO2, PO2 in the last 72 hours. Films:  Radiology Review:  Pertinent images / reports were reviewed as a part of this visit. CT Chest w/ contrast:   Results for orders placed in visit on 16   CT Chest W Contrast    Narrative [id number] Report: Radiology [facility]          Name: Tabitha Mckinney                                            Phys: Robbi Andrews MD                                            : 1955 Age: 63      Sex: F                                            Acct: [de-identified]     Loc: [place]                                            Exam Date: 2016 Status: REG REF                                            Radiology No: [id number]                                            Unit No: [id number]              EXAMS:  [id number] CT THORAX WITH CONTRAST       HISTORY:  Bilateral breast cancer staging. PRIORS:  None. TECHNIQUE:  Helical scans were obtained throughout the chest and     reconstructed into the axial, sagittal, and coronal planes following the     intravenous administration of 120 cc of Isovue 370. Radiation Dose     reduction was achieved using Adaptive Statistical Iterative Reconstruction     (ASIR) in combination with AutomA and/or SmartmA technique when     appropriate. FINDINGS:       There is presumed dependent atelectasis in both lung bases and left     laterally along the major fissure. No non-calcified intrapulmonary nodule     or mass is apparent. No pleural fluid is present. The trachea and major bronchi are widely patent. The mary are normal in     size and configuration. No non-calcified pretracheal or other mediastinal     lymph node enlargement is present. The heart is normal in size. No pericardial fluid is present. The thoracic     aorta is normal in caliber.  The examination was not designed to evaluate     for potential pulmonary embolic disease. The left breast has not been fully included in the imaging examination. The right breast has been partially excluded but does demonstrate a     low-density mass potentially related to biopsy. Surgical clips are present     within the right axillary region suggesting no dissection. There is a     clinically significant thoracic scoliosis with the primary curve convex to     the right. It has been partially stabilized by posterior rods. Upper     abdominal findings will be discussed in a CT of the abdomen and pelvis     performed this same date and separately reported. IMPRESSION:       1. PRESUMED BILATERAL DEPENDENT ATELECTASIS. NO SUSPICIOUS PULMONARY MASS     LESION. PAGE 1               Signed Report                    (CONTINUED)            [facility]          Name: Anya Sim                                            Phys: Mariel Kevin MD                                            : 1955 Age: 63      Sex: F                                            Acct: [de-identified]     Loc: [place]                                            Exam Date: 2016 Status: REG REF                                            Radiology No: [id number]                                            Unit No: [id number]              EXAMS:  [id number] CT THORAX WITH CONTRAST       2. SOFT TISSUE BREAST AND AXILLARY FINDINGS AS DISCUSSED.                      ------------------------------                    Reported By: ELVIRA Wong       CC: Gaby Cochran MD; Yolanda Miller MD       Technologist: MONE Juarez (R, CT)     Transcribed Date/Time: 2016 (5458)    :  COURTNEY     Signed Date/Time: 16 (0912)       PAGE 2               Signed Report         CT Chest w/o contrast: No results found for this or any previous visit.     CTPA:   Results for orders placed during the hospital encounter of 12/28/20   CT CHEST PULMONARY EMBOLISM W CONTRAST    Narrative EXAMINATION:  CTA OF THE CHEST 1/10/2021 3:39 pm    TECHNIQUE:  CTA of the chest was performed after the administration of intravenous  contrast.  Multiplanar reformatted images are provided for review. MIP  images are provided for review. Dose modulation, iterative reconstruction,  and/or weight based adjustment of the mA/kV was utilized to reduce the  radiation dose to as low as reasonably achievable. COMPARISON:  None. HISTORY:  ORDERING SYSTEM PROVIDED HISTORY: elevated D-dimer, hypotension  TECHNOLOGIST PROVIDED HISTORY:  Reason for exam:->elevated D-dimer, hypotension  Reason for Exam: elevated d dimer hypotensive  Acuity: Acute  Type of Exam: Initial    FINDINGS:  Pulmonary Arteries: Pulmonary arteries are adequately opacified for  evaluation. No evidence of intraluminal filling defect to suggest pulmonary  embolism. Main pulmonary artery is normal in caliber. Mediastinum: The aorta is mildly dilated well opacified with no filling  defect. There are small lymph nodes in the mediastinum measuring up to 7 mm. Lungs/pleura: There is a small left pleural effusion leg posteriorly. There  are extensive ground-glass opacities throughout both upper lobes extending  inferiorly into the perihilar regions with air bronchograms. There are  subsegmental opacities scattered along both lung bases. No pulmonary nodule  or mass can be seen. Upper abdomen: The adrenals are not well visualized. There is a 9 mm  hypodensity in the left lobe of the liver anteriorly which does not enhance. Bones/ soft tissues: There are moderate degenerative changes throughout the  spine with scoliosis and a Landry jaime in place with bony fusion of the  posterior elements. Impression No evidence of pulmonary embolism. Mildly dilated and atherosclerotic thoracic aorta with no aneurysm or  dissection.     Extensive ground-glass opacities throughout both lungs some and subsegmental  bibasilar opacities which could represent extensive multisegmental  bronchopneumonia and/or pulmonary edema vs pulmonary hemorrhage. Small left pleural effusion. Small lymph nodes scattered in the mediastinum which are not pathologic by  size criteria. Moderate degenerative changes and postop changes in the spine    9 mm hypodensity left lobe liver which could represent a cyst but is  ill-defined. Suggest ultrasound correlation. CXR PA/LAT: No results found for this or any previous visit. CXR portable:   Results for orders placed during the hospital encounter of 12/28/20   XR CHEST PORTABLE    Narrative EXAMINATION:  ONE XRAY VIEW OF THE CHEST    12/28/2020 9:31 pm    COMPARISON:  10/03/2007    HISTORY:  ORDERING SYSTEM PROVIDED HISTORY: sob  TECHNOLOGIST PROVIDED HISTORY:  Reason for exam:->sob  Reason for Exam: sob  Acuity: Acute  Type of Exam: Initial    FINDINGS:  The heart size is stable. Posterior fixation jaime remains in place. Bilateral ill-defined airspace opacities. No pneumothorax. No pleural  effusion. Impression Bilateral ill-defined airspace opacities could represent atypical pneumonia,  multifocal bacterial pneumonia or subsegmental atelectasis                  This note was transcribed using [employer]. Please disregard any translational errors.     Thank you for this consult,    Edna Rand [address] [facility]

## 2021-01-12 NOTE — PROGRESS NOTES
RT entered room, Patient SpO2 85% on 15L 100% Vapo, RR labored. Vapotherm increased gradually until a SpO2 reading was 90-93% consistently. Current Vapo Settings 30L 100%. Patient RR remain 30+.

## 2021-01-12 NOTE — PROGRESS NOTES
Infectious Disease Follow up Notes  Admit Date: 12/28/2020  Hospital Day: 16    Antibiotics :   IV Cefepime     CHIEF COMPLAINT:     COVID 19 PNA  Hypoxic resp failure  WBC elevation  Sepsis  UTI  Bacteremia      Subjective interval History :  63 y.o. woman with a past history of breast cancer, depression, hypertension, obesity, scoliosis, thyroid disease surgical history include hysterectomy breast biopsy back surgery admitted to [facility] [place] on 12/29/2020 with cough shortness of breath diagnosed with COVID-19 pneumonia. She was in acute hypoxic respiratory failure requiring high heated flow oxygenation. She completed a course of IV remdesivir dexamethasone and also received convalescent plasma during this hospitalization. She now developed a WBC elevation with elevated procalcitonin, hence blood culture and urine culture were obtained. Blood cultures from 1/10/2021 no isolated Klebsiella pneumonia as well as urine culture positive for Klebsiella pneumonia. She was started on IV antibiotics given the ongoing sepsis with WBC elevation we are consulted for recommendations. She is still currently receiving 15 L of heated high flow nasal cannula for COVID-19 pneumonia. CT chest from 1/10/2021 still indicates ongoing extensive groundglass opacities in both lung consistent with COVID-19 infection.     Interval History : Remains short of breath using heated high flow nasal cannula weaned down to 15 L , complains of intermittent cough. Fever seems to be trending down.   WBC slowly trending down tolerating antibiotic therapy okay      Past Medical History:    Past Medical History:   Diagnosis Date    Arthritis     Breast cancer ([address]     Depression     Hypertension     Obesity     Peptic ulcer disease     Scoliosis     Skin cancer     basal cell    Thyroid disease        Past Surgical History:    Past Surgical History: Procedure Laterality Date    BACK SURGERY      BREAST BIOPSY      BREAST LUMPECTOMY      CARPAL TUNNEL RELEASE      DILATION AND CURETTAGE OF UTERUS      HYSTERECTOMY      OVARY REMOVAL      THYROID SURGERY         Current Medications:    Outpatient Medications Marked as Taking for the 12/28/20 encounter [facility] Encounter)   Medication Sig Dispense Refill    lisinopril (PRINIVIL;ZESTRIL) 30 MG tablet Take 30 mg by mouth daily      metoprolol succinate (TOPROL XL) 100 MG extended release tablet Take 100 mg by mouth daily      amLODIPine (NORVASC) 5 MG tablet Take 5 mg by mouth daily      dicyclomine (BENTYL) 20 MG tablet Take 20 mg by mouth 3 times daily as needed      tiZANidine (ZANAFLEX) 2 MG tablet Take 4 mg by mouth nightly      anastrozole (ARIMIDEX) 1 MG tablet TAKE 1 TABLET BY MOUTH DAILY 30 tablet 5    alendronate (FOSAMAX) 70 MG tablet Take 1 tablet by mouth every 7 days 4 tablet 3    Fexofenadine HCl (ALLEGRA PO) Take 1 tablet by mouth daily as needed       Calcium Carbonate-Vit D-Min (CALCIUM 1200 PO) Take by mouth Daily       Multiple Vitamins-Minerals (CENTRUM PO) Take by mouth      simvastatin (ZOCOR) 40 MG tablet Take 40 mg by mouth nightly      levothyroxine (LEVOTHROID) 75 MCG tablet Take 75 mcg by mouth Daily      DULoxetine (CYMBALTA) 60 MG extended release capsule Take 60 mg by mouth daily      metFORMIN (GLUCOPHAGE) 500 MG tablet Take 1,000 mg by mouth 2 times daily (with meals)       aspirin 81 MG tablet Take 81 mg by mouth daily      naproxen (NAPROSYN) 375 MG tablet Take 375 mg by mouth as needed       clobetasol (TEMOVATE) 0.05 % ointment Apply topically once a week Apply topically 2 times daily.          Allergies:  Mercury    Immunizations :   Immunization History   Administered Date(s) Administered    Pneumococcal Conjugate 13-valent Silvia An) 11/23/2015       Social History:    Social History     Tobacco Use    Smoking status: Never Smoker    Smokeless tobacco: Never Used   Substance Use Topics    Alcohol use: Not Currently    Drug use: Never     Social History     Tobacco Use   Smoking Status Never Smoker   Smokeless Tobacco Never Used      Family History : no DVT no COPD       REVIEW OF SYSTEMS:     Constitutional:  fevers + , chills, night sweats  Eyes:  negative for blurred vision, eye discharge, visual disturbance   HEENT:  negative for hearing loss, ear drainage,nasal congestion  Respiratory:  cough+ , shortness of breath +  or hemoptysis   Cardiovascular:  negative for chest pain, palpitations, syncope  Gastrointestinal:  negative for nausea, vomiting, diarrhea, constipation, abdominal pain  Genitourinary:  negative for frequency, dysuria, urinary incontinence, hematuria  Hematologic/Lymphatic:  negative for easy bruising, bleeding and lymphadenopathy  Allergic/Immunologic:  negative for recurrent infections, angioedema, anaphylaxis   Endocrine:  negative for weight changes, polyuria, polydipsia and polyphagia  Musculoskeletal:  negative for joint  pain, swelling, decreased range of motion  Integumentary: No rashes, skin lesions  Neurological:  negative for headaches, slurred speech, unilateral weakness  Psychiatric: negative for hallucinations,confusion,agitation. PHYSICAL EXAM:      Vitals:    /66   Pulse 69   Temp 98.5 °F (36.9 °C) (Oral)   Resp 20   Ht 5' 6\" (1.676 m)   Wt 181 lb 14.1 oz (82.5 kg)   SpO2 97%   BMI 29.36 kg/m²     PHYSICAL EXAM:     In-person bedside physical examination deferred. Pursuant to the emergency declaration under the [address] [place] [address], [address] [place] [address] and the [employer] Act, this clinical encounter was conducted to provide necessary medical care.    (Also consistent with new provisions and guidance offered by [facility] on March 18, 2020 in setting of COVID 19 outbreak and in order to minimize NITRU Negative 01/10/2021    LEUKOCYTESUR MODERATE 01/10/2021    LABMICR YES 01/10/2021    URINETYPE NotGiven 01/10/2021      Urine Microscopic:   Lab Results   Component Value Date    BACTERIA 4+ 01/10/2021    COMU see below 01/10/2021    WBCUA 153 01/10/2021    RBCUA 34 01/10/2021    EPIU 1 01/10/2021     Urine Reflex to Culture: No results found for: URRFLXCULT      MICRO: cultures reviewed and updated by me   Blood Culture:          Culture, Urine [[id number]] (Abnormal)  Collected: 01/10/21 1130   Order Status: Completed Specimen: Urine, clean catch Updated: 01/12/21 0758    Organism Klebsiella pneumoniaeAbnormal     Urine Culture, Routine >100,000 CFU/ml   Narrative:     ORDER#: [id number]                          ORDERED BY: Kelsie Hanson   SOURCE: Urine Clean Catch                  COLLECTED:  01/10/21 11:30   ANTIBIOTICS AT [facility]. :                      RECEIVED :  01/10/21 11:49   Performed at:   [facility]   [address] [place], [place] [facility] [address]   Phone [phone]   Culture, Blood 2 [[id number]] (Abnormal) Collected: 01/10/21 1541   Order Status: Completed Specimen: Blood Updated: 01/12/21 0725    Culture, Blood 2 --Abnormal     Gram stain Aerobic bottle:   Gram negative rods   Information to follow   Gram stain Anaerobic bottle:   Gram negative rods   Information to follow   Abnormal     Organism Klebsiella pneumoniaeAbnormal     Culture, Blood 2 --    POSITIVE for   No further workup   Refer to culture [id number] for sensitivity results    Isolated two of two sets    Narrative:     ORDER#: [id number]                          ORDERED BY: Kelsie Hanson   SOURCE: Blood                              COLLECTED:  01/10/21 15:41   ANTIBIOTICS AT [facility] :                      RECEIVED :  01/10/21 15:41   CALL  [facility]dom  [id number] tel. [phone],   Previous panic on this admission - call not needed per SOP, 01/11/2021 07:03,   by Jose Angel Hernández   If child <=2 yrs old please draw pediatric bottle. ~Blood Culture #2   Performed at:   [facility]   [address] [address]   Phone [phone]   Culture, Blood 1 [[id number]] (Abnormal) Collected: 01/10/21 1310   Order Status: Completed Specimen: Blood Updated: 01/12/21 0724    Blood Culture, Routine --Abnormal     Gram stain Aerobic bottle:   Gram negative rods   Information to follow   Gram stain Anaerobic bottle:   Gram negative rods   Information to follow   Abnormal     Organism Klebsiella pneumoniae DNA DetectedAbnormal     Blood Culture, Routine See additional report for complete BCID panel. Organism Klebsiella pneumoniaeAbnormal     Blood Culture, Routine --    POSITIVE for   Sensitivity to follow    Isolated two of two sets    Narrative:     ORDER#: [id number]                          ORDERED BY: Arnol Ro   SOURCE: Blood                              COLLECTED:  01/10/21 13:10   ANTIBIOTICS AT [facility] :                      RECEIVED :  01/10/21 13:17   CALL  Odom  [facility] [place] [phone], [phone]   Microbiology results called to and read back by pharmacist Arlin Guevara,   01/11/2021 07:07, by [facility]   Microbiology results called to and read back by DUY Oliver, 01/11/2021   07:07, by [facility]   If child <=2 yrs old please draw pediatric bottle. ~Blood Culture 1   Performed at:   [facility]   [address] [address]   Phone [phone]   Culture, Blood 1 [[id number]]    Order Status: Sent Specimen: Blood    Culture, Blood 2 [[id number]]    Order Status: Sent Specimen: Blood    MRSA DNA Probe, Nasal [[id number]] Collected: 01/10/21 1400   Order Status: Completed Specimen: Nares Updated: 01/11/21 2251    MRSA SCREEN RT-PCR --    Negative  MRSA DNA not detected.    Normal Range: Not detected    Narrative:     ORDER#: [id number]                          ORDERED BY: Arnol Ro   SOURCE: Nares                              COLLECTED:  01/10/21 14:00 ANTIBIOTICS AT [facility]. :                      RECEIVED :  01/10/21 14:03   Performed at:   [facility]   [address] [facility] [address] [facility] [place] [address]   Phone [phone]   Strep Pneumoniae Antigen [[id number]] Collected: 01/10/21 1130   Order Status: Completed Specimen: Urine, clean catch Updated: 01/11/21 1347    STREP PNEUMONIAE ANTIGEN, URINE --    Presumptive Negative   Presumptive negative suggests no current or recent   pneumococcal infection. Infection due to Strep pneumoniae   cannot be ruled out since the antigen present in the sample   may be below the detection limit of the test.   Normal Range:Presumptive Negative    Narrative:     ORDER#: [id number]                          ORDERED BY: Cherry Arnold   SOURCE: Urine Clean Catch                  COLLECTED:  01/10/21 11:30   ANTIBIOTICS AT [facility] :                      RECEIVED :  01/10/21 11:49   Performed at:   [facility]   [address] [facility] [employer] [address]   Phone [phone]   Legionella Antigen, Urine [[id number]] Collected: 01/10/21 1130   Order Status: Completed Specimen: Urine, clean catch Updated: 01/11/21 1346    L. pneumophila Serogp 1 Ur Ag --    Presumptive Negative   No Legionella pneumophila serogroup 1 antigens detected. A negative result does not exclude infection with   Legionella pneumophila serogroup 1 nor does it rule out   other microbial-caused respiratory infections or   disease caused by other serogroups of   Legionella pneumophila. Normal Range: Presumptive Negative    Narrative:     ORDER#: [id number]                          ORDERED BY: Cherry Arnold   SOURCE: Urine Clean Catch                  COLLECTED:  01/10/21 11:30   ANTIBIOTICS AT [facility] :                      RECEIVED :  01/10/21 11:49   Performed at:   [facility]   [address] [facility], [place], [place] [address]   Phone [phone]   Culture, Blood, PCR ID Panel ceFAZolin Sensitive <=4 mcg/mL      NOTE: Cefazolin should only be used for uncomplicated UTI         for E.coli or Klebsiella pneumoniae. cefepime Sensitive <=0.12 mcg/mL     cefTRIAXone Sensitive <=0.25 mcg/mL     ciprofloxacin Sensitive <=0.25 mcg/mL     ertapenem Sensitive <=0.12 mcg/mL     gentamicin Sensitive <=1 mcg/mL     levofloxacin Sensitive <=0.12 mcg/mL     nitrofurantoin Sensitive <=16 mcg/mL     piperacillin-tazobactam Sensitive <=4 mcg/mL     trimethoprim-sulfamethoxazole Sensitive <=20 mcg/mL         Lab Results   Component Value Date    [facility]  01/10/2021     Gram stain Aerobic bottle:  Gram negative rods  Information to follow  Gram stain Anaerobic bottle:  Gram negative rods  Information to follow      [facility] See additional report for complete BCID panel. 01/10/2021    BC  01/10/2021     POSITIVE for  Sensitivity to follow   Isolated two of two sets      BLOODCULT2  01/10/2021     Gram stain Aerobic bottle:  Gram negative rods  Information to follow  Gram stain Anaerobic bottle:  Gram negative rods  Information to follow      Aleck Alfie  01/10/2021     POSITIVE for  No further workup  Refer to culture [id number] for sensitivity results   Isolated two of two sets         Respiratory Culture:  No results found for: Malissa Hickey  AFB:No results found for: [facility]  Viral Culture:  Lab Results   Component Value Date    COVID19 DETECTED 12/28/2020     Urine Culture:   Recent Labs     01/10/21  1130   LABURIN >100,000 CFU/ml         IMAGING:    XR CHEST PORTABLE   Final Result   Stable diffuse bilateral lung disease compatible with pneumonia and/or edema         CT CHEST PULMONARY EMBOLISM W CONTRAST   Final Result   No evidence of pulmonary embolism. Mildly dilated and atherosclerotic thoracic aorta with no aneurysm or   dissection.       Extensive ground-glass opacities throughout both lungs some and subsegmental   bibasilar opacities which could represent extensive multisegmental bronchopneumonia and/or pulmonary edema vs pulmonary hemorrhage. Small left pleural effusion. Small lymph nodes scattered in the mediastinum which are not pathologic by   size criteria. Moderate degenerative changes and postop changes in the spine      9 mm hypodensity left lobe liver which could represent a cyst but is   ill-defined. Suggest ultrasound correlation.          XR CHEST PORTABLE   Final Result   Bilateral ill-defined airspace opacities could represent atypical pneumonia,   multifocal bacterial pneumonia or subsegmental atelectasis               All the pertinent images and reports for the current Hospitalization were reviewed by me     Scheduled Meds:   predniSONE  60 mg Oral Daily    lidocaine 1 % injection  5 mL Intradermal Once    sodium chloride flush  10 mL Intravenous 2 times per day    cefepime  2 g Intravenous Q12H    senna  1 tablet Oral Nightly    polyethylene glycol  17 g Oral Daily    budesonide-formoterol  2 puff Inhalation BID    enoxaparin  40 mg Subcutaneous BID    [Held by provider] amLODIPine  5 mg Oral Daily    [Held by provider] lisinopril  30 mg Oral Daily    [Held by provider] metoprolol tartrate  100 mg Oral BID    sodium chloride flush  10 mL Intravenous 2 times per day    aspirin  81 mg Oral Daily    levothyroxine  75 mcg Oral Daily    atorvastatin  40 mg Oral Daily    insulin lispro  0-6 Units Subcutaneous TID WC    insulin lispro  0-3 Units Subcutaneous Nightly    anastrozole  1 mg Oral Daily    DULoxetine  60 mg Oral Daily       Continuous Infusions:   sodium chloride 100 mL/hr at 01/12/21 0701    sodium chloride      dextrose         PRN Meds:  sodium chloride flush, LORazepam, albuterol sulfate HFA **AND** ipratropium **AND** MDI Treatment, sodium chloride, sodium chloride flush, acetaminophen **OR** acetaminophen, glucose, dextrose, glucagon (rDNA), dextrose, ondansetron, guaiFENesin-dextromethorphan, sodium chloride      Assessment: Patient Active Problem List   Diagnosis    Malignant neoplasm of central portion of right female breast ([facility]    Encounter for consultation    Encounter for antineoplastic radiation therapy    ER+ (estrogen receptor positive status)    HER2-negative carcinoma of breast ([facility]    Use of anastrozole (Arimidex)    Visit for monitoring Arimidex therapy    Osteopenia    Pneumonia due to organism    Suspected COVID-19 virus infection    Hypothyroidism    HLD (hyperlipidemia)    PCOS (polycystic ovarian syndrome)    Acute respiratory failure with hypoxia ([facility]    2019 novel coronavirus-infected pneumonia ([facility])    Elevated LDH    Elevated AST (SGOT)    Elevated ferritin    Elevated d-dimer    History of depression    Essential hypertension    Class 2 obesity due to excess calories with body mass index (BMI) of 35.0 to 35.9 in adult    Pneumonia due to COVID-19 virus    Hyperglycemia    Leukocytosis       Sepsis  Klebsiella pneumonia urinary tract infection  Urinalysis very abnormal from 1/10/2021  Klebsiella pneumoniae bacteremia  WBC elevation  COVID-19 pneumonia  Acute hypoxic respiratory failure  CT chest with bilateral multifocal pneumonia  Elevated D-dimer elevated ferritin  History of breast cancer  History of diabetes  BMI 29.36  Elevated procalcitonin  Lactic acidosis   Elevated CRP      She continues to require high flow oxygen supplementation for acute hypoxic respiratory failure from COVID-19 pneumonia. Unfortunately she now developed sepsis secondary to urinary tract infection with Klebsiella in the urine as well as in the bloodstream.  Will await sensitivities on the culture to adjust antibiotic therapy. Agree with IV antibiotics at this time. She completed a course of remdesivir dexamethasone and plasma for her COVID-19 pneumonia. Unfortunately  CT scan of the chest with  still ongoing changes secondary to COVID-19.     Labs, Microbiology, Radiology and all the pertinent results from current hospitalization and  care every where were reviewed  by me as a part of the evaluation   Plan:   1. Cont IV Cefepime x 2 gm q 12 hrs  2. Klebsiella sensitivities noted   3. Repeat Blood cx  on 1/13   4. Cont supportive care  5. She completed the Treatment course for COVID and now on supportive care with Oxygen supplementation  6. CRP, D dimer elevation now likely from sepsis    7. On steroids per Pulmonary  8. Has oral thrush - Fluconaozle oral       Discussed with patient/Family and Nursing staff   Risk of Complications/Morbidity: High      · Illness(es)/ Infection present that pose threat to bodily function. · There is potential for severe exacerbation of infection/side effects of treatment. · Therapy requires intensive monitoring for antimicrobial agent toxicity. Thanks for allowing me to participate in your patient's care and please call me with any questions or concerns.     Mauricio Delaney MD  Infectious Disease  [facility] Physician  Phone: [phone]   Fax : [phone]

## 2021-01-13 LAB
A/G RATIO: 0.7 (ref 1.1–2.2)
ALBUMIN SERPL-MCNC: 2.2 G/DL (ref 3.4–5)
ALP BLD-CCNC: 80 U/L (ref 40–129)
ALT SERPL-CCNC: 13 U/L (ref 10–40)
ANION GAP SERPL CALCULATED.3IONS-SCNC: 10 MMOL/L (ref 3–16)
AST SERPL-CCNC: 16 U/L (ref 15–37)
BASOPHILS ABSOLUTE: 0 K/UL (ref 0–0.2)
BASOPHILS RELATIVE PERCENT: 0.1 %
BILIRUB SERPL-MCNC: 0.3 MG/DL (ref 0–1)
BUN BLDV-MCNC: 27 MG/DL (ref 7–20)
CALCIUM SERPL-MCNC: 8.1 MG/DL (ref 8.3–10.6)
CHLORIDE BLD-SCNC: 109 MMOL/L (ref 99–110)
CO2: 23 MMOL/L (ref 21–32)
CREAT SERPL-MCNC: 0.7 MG/DL (ref 0.6–1.2)
EOSINOPHILS ABSOLUTE: 0 K/UL (ref 0–0.6)
EOSINOPHILS RELATIVE PERCENT: 0.1 %
GFR AFRICAN AMERICAN: >60
GFR NON-AFRICAN AMERICAN: >60
GLOBULIN: 3 G/DL
GLUCOSE BLD-MCNC: 107 MG/DL (ref 70–99)
GLUCOSE BLD-MCNC: 116 MG/DL (ref 70–99)
GLUCOSE BLD-MCNC: 128 MG/DL (ref 70–99)
GLUCOSE BLD-MCNC: 199 MG/DL (ref 70–99)
GLUCOSE BLD-MCNC: 95 MG/DL (ref 70–99)
HCT VFR BLD CALC: 31.7 % (ref 36–48)
HEMOGLOBIN: 10.2 G/DL (ref 12–16)
LYMPHOCYTES ABSOLUTE: 0.5 K/UL (ref 1–5.1)
LYMPHOCYTES RELATIVE PERCENT: 5.5 %
MCH RBC QN AUTO: 30.3 PG (ref 26–34)
MCHC RBC AUTO-ENTMCNC: 32.3 G/DL (ref 31–36)
MCV RBC AUTO: 93.6 FL (ref 80–100)
MONOCYTES ABSOLUTE: 0.7 K/UL (ref 0–1.3)
MONOCYTES RELATIVE PERCENT: 7.1 %
NEUTROPHILS ABSOLUTE: 8.1 K/UL (ref 1.7–7.7)
NEUTROPHILS RELATIVE PERCENT: 87.2 %
PDW BLD-RTO: 15.1 % (ref 12.4–15.4)
PERFORMED ON: ABNORMAL
PLATELET # BLD: 220 K/UL (ref 135–450)
PMV BLD AUTO: 9.8 FL (ref 5–10.5)
POTASSIUM REFLEX MAGNESIUM: 3.9 MMOL/L (ref 3.5–5.1)
PROCALCITONIN: 5.48 NG/ML (ref 0–0.15)
RBC # BLD: 3.38 M/UL (ref 4–5.2)
SODIUM BLD-SCNC: 142 MMOL/L (ref 136–145)
TOTAL PROTEIN: 5.2 G/DL (ref 6.4–8.2)
WBC # BLD: 9.3 K/UL (ref 4–11)

## 2021-01-13 PROCEDURE — 6360000002 HC RX W HCPCS: Performed by: INTERNAL MEDICINE

## 2021-01-13 PROCEDURE — 6370000000 HC RX 637 (ALT 250 FOR IP): Performed by: FAMILY MEDICINE

## 2021-01-13 PROCEDURE — 6370000000 HC RX 637 (ALT 250 FOR IP): Performed by: STUDENT IN AN ORGANIZED HEALTH CARE EDUCATION/TRAINING PROGRAM

## 2021-01-13 PROCEDURE — 93005 ELECTROCARDIOGRAM TRACING: CPT | Performed by: INTERNAL MEDICINE

## 2021-01-13 PROCEDURE — 80053 COMPREHEN METABOLIC PANEL: CPT

## 2021-01-13 PROCEDURE — 99233 SBSQ HOSP IP/OBS HIGH 50: CPT | Performed by: INTERNAL MEDICINE

## 2021-01-13 PROCEDURE — 99232 SBSQ HOSP IP/OBS MODERATE 35: CPT | Performed by: INTERNAL MEDICINE

## 2021-01-13 PROCEDURE — 2580000003 HC RX 258: Performed by: INTERNAL MEDICINE

## 2021-01-13 PROCEDURE — 6370000000 HC RX 637 (ALT 250 FOR IP): Performed by: INTERNAL MEDICINE

## 2021-01-13 PROCEDURE — 36415 COLL VENOUS BLD VENIPUNCTURE: CPT

## 2021-01-13 PROCEDURE — 2060000000 HC ICU INTERMEDIATE R&B

## 2021-01-13 PROCEDURE — 94761 N-INVAS EAR/PLS OXIMETRY MLT: CPT

## 2021-01-13 PROCEDURE — 84145 PROCALCITONIN (PCT): CPT

## 2021-01-13 PROCEDURE — 85025 COMPLETE CBC W/AUTO DIFF WBC: CPT

## 2021-01-13 PROCEDURE — 2700000000 HC OXYGEN THERAPY PER DAY

## 2021-01-13 PROCEDURE — 87040 BLOOD CULTURE FOR BACTERIA: CPT

## 2021-01-13 RX ADMIN — Medication 2 PUFF: at 22:31

## 2021-01-13 RX ADMIN — ACETAMINOPHEN 650 MG: 325 TABLET ORAL at 02:02

## 2021-01-13 RX ADMIN — GUAIFENESIN AND DEXTROMETHORPHAN 5 ML: 100; 10 SYRUP ORAL at 17:34

## 2021-01-13 RX ADMIN — ASPIRIN 81 MG 81 MG: 81 TABLET ORAL at 08:09

## 2021-01-13 RX ADMIN — LEVOTHYROXINE SODIUM 75 MCG: 0.07 TABLET ORAL at 06:22

## 2021-01-13 RX ADMIN — ACETAMINOPHEN 650 MG: 325 TABLET ORAL at 22:31

## 2021-01-13 RX ADMIN — ANASTROZOLE 1 MG: 1 TABLET ORAL at 08:10

## 2021-01-13 RX ADMIN — ACETAMINOPHEN 650 MG: 325 TABLET ORAL at 10:38

## 2021-01-13 RX ADMIN — SODIUM CHLORIDE: 9 INJECTION, SOLUTION INTRAVENOUS at 13:44

## 2021-01-13 RX ADMIN — POLYETHYLENE GLYCOL 3350 17 G: 17 POWDER, FOR SOLUTION ORAL at 08:09

## 2021-01-13 RX ADMIN — INSULIN LISPRO 1 UNITS: 100 INJECTION, SOLUTION INTRAVENOUS; SUBCUTANEOUS at 17:36

## 2021-01-13 RX ADMIN — SODIUM CHLORIDE: 9 INJECTION, SOLUTION INTRAVENOUS at 01:47

## 2021-01-13 RX ADMIN — ENOXAPARIN SODIUM 40 MG: 40 INJECTION SUBCUTANEOUS at 08:10

## 2021-01-13 RX ADMIN — GUAIFENESIN AND DEXTROMETHORPHAN 5 ML: 100; 10 SYRUP ORAL at 22:31

## 2021-01-13 RX ADMIN — GUAIFENESIN AND DEXTROMETHORPHAN 5 ML: 100; 10 SYRUP ORAL at 10:38

## 2021-01-13 RX ADMIN — Medication 2 PUFF: at 08:08

## 2021-01-13 RX ADMIN — CEFEPIME HYDROCHLORIDE 2 G: 2 INJECTION, POWDER, FOR SOLUTION INTRAVENOUS at 13:45

## 2021-01-13 RX ADMIN — LORAZEPAM 0.5 MG: 0.5 TABLET ORAL at 18:49

## 2021-01-13 RX ADMIN — FLUCONAZOLE 100 MG: 100 TABLET ORAL at 08:09

## 2021-01-13 RX ADMIN — STANDARDIZED SENNA CONCENTRATE 8.6 MG: 8.6 TABLET ORAL at 22:31

## 2021-01-13 RX ADMIN — CEFEPIME HYDROCHLORIDE 2 G: 2 INJECTION, POWDER, FOR SOLUTION INTRAVENOUS at 00:56

## 2021-01-13 RX ADMIN — PREDNISONE 60 MG: 20 TABLET ORAL at 08:11

## 2021-01-13 RX ADMIN — LORAZEPAM 0.5 MG: 0.5 TABLET ORAL at 02:33

## 2021-01-13 RX ADMIN — DULOXETINE HYDROCHLORIDE 60 MG: 60 CAPSULE, DELAYED RELEASE ORAL at 08:10

## 2021-01-13 RX ADMIN — GUAIFENESIN AND DEXTROMETHORPHAN 5 ML: 100; 10 SYRUP ORAL at 02:33

## 2021-01-13 RX ADMIN — ATORVASTATIN CALCIUM 40 MG: 40 TABLET, FILM COATED ORAL at 08:09

## 2021-01-13 RX ADMIN — ENOXAPARIN SODIUM 40 MG: 40 INJECTION SUBCUTANEOUS at 22:31

## 2021-01-13 RX ADMIN — ACETAMINOPHEN 650 MG: 325 TABLET ORAL at 17:34

## 2021-01-13 ASSESSMENT — PAIN DESCRIPTION - LOCATION
LOCATION: GENERALIZED
LOCATION: HEAD

## 2021-01-13 ASSESSMENT — PAIN SCALES - GENERAL
PAINLEVEL_OUTOF10: 2
PAINLEVEL_OUTOF10: 0
PAINLEVEL_OUTOF10: 3
PAINLEVEL_OUTOF10: 1

## 2021-01-13 ASSESSMENT — PAIN DESCRIPTION - PAIN TYPE: TYPE: ACUTE PAIN

## 2021-01-13 ASSESSMENT — PAIN SCALES - WONG BAKER
WONGBAKER_NUMERICALRESPONSE: 0

## 2021-01-13 ASSESSMENT — PAIN - FUNCTIONAL ASSESSMENT
PAIN_FUNCTIONAL_ASSESSMENT: ACTIVITIES ARE NOT PREVENTED

## 2021-01-13 ASSESSMENT — PAIN DESCRIPTION - ONSET
ONSET: ON-GOING
ONSET: ON-GOING

## 2021-01-13 ASSESSMENT — PAIN DESCRIPTION - FREQUENCY
FREQUENCY: INTERMITTENT

## 2021-01-13 ASSESSMENT — PAIN DESCRIPTION - DESCRIPTORS
DESCRIPTORS: HEADACHE
DESCRIPTORS: ACHING
DESCRIPTORS: HEADACHE

## 2021-01-13 NOTE — PROGRESS NOTES
Pulmonary Progress Note    Date of Admission: 12/28/2020   LOS: 16 days     Chief Complaint   Patient presents with    Shortness of Breath     c/o sore throat, cough, and right ear feels full x 2 days. Patient also c/o SOB. Patient currently eating cheeseburger. No sob noted. Patient 88% r/a        Assessment:     Acute Hypoxemic Respiratory Failure with SAO2 <90% on Room Air  COVID 19 PNa  klebseilla UTI  Klebsiella bacteremia    Plan:      -Wean supplemental oxygen to goal saturation of >90%  -hypoxia improving, but tends to wax and wane  -on prednisone  -proccal cont to downtrend  -symbicort    24 Hour Events/Subjective  Oxygen requirement improving. Off vapotherm now. Dyspnea improving but reports fatigue    ROS:   No nausea  No Vomiting  No chest pain      Intake/Output Summary (Last 24 hours) at 1/13/2021 1231  Last data filed at 1/13/2021 1110  Gross per 24 hour   Intake 2525 ml   Output 800 ml   Net 1725 ml         PHYSICAL EXAM:   Blood pressure (!) 140/90, pulse 95, temperature 98 °F (36.7 °C), temperature source Oral, resp. rate 21, height 5' 6\" (1.676 m), weight 183 lb 4.8 oz (83.1 kg), SpO2 90 %.'  Gen:  No acute distress. Eyes: PERRL. Anicteric sclera. No conjunctival injection. ENT: No discharge. Posterior oropharynx clear. External appearance of ears and nose normal.  Neck: Trachea midline. No mass   Resp:  No crackles. No wheezes. No rhonchi. No dullness on percussion. CV: Regular rate. Regular rhythm. No murmur or rub. No edema. GI: Soft, Non-tender. Non-distended. +BS  Skin: Warm, dry, w/o erythema. Lymph: No cervical or supraclavicular LAD. M/S: No cyanosis. No clubbing. Neuro:  no focal neurologic deficit. Moves all extremities  Psych: Awake and alert, Oriented x 3. Judgement and insight appropriate. Mood stable.       Medications:    Scheduled Meds:   predniSONE  60 mg Oral Daily    fluconazole  100 mg Oral Daily    lidocaine 1 % injection  5 mL Intradermal Once    sodium chloride flush  10 mL Intravenous 2 times per day    cefepime  2 g Intravenous Q12H    senna  1 tablet Oral Nightly    polyethylene glycol  17 g Oral Daily    budesonide-formoterol  2 puff Inhalation BID    enoxaparin  40 mg Subcutaneous BID    [Held by provider] amLODIPine  5 mg Oral Daily    [Held by provider] lisinopril  30 mg Oral Daily    [Held by provider] metoprolol tartrate  100 mg Oral BID    sodium chloride flush  10 mL Intravenous 2 times per day    aspirin  81 mg Oral Daily    levothyroxine  75 mcg Oral Daily    atorvastatin  40 mg Oral Daily    insulin lispro  0-6 Units Subcutaneous TID     insulin lispro  0-3 Units Subcutaneous Nightly    anastrozole  1 mg Oral Daily    DULoxetine  60 mg Oral Daily       Continuous Infusions:   sodium chloride 100 mL/hr at 01/13/21 0147    sodium chloride      dextrose         PRN Meds:  sodium chloride flush, LORazepam, albuterol sulfate HFA **AND** ipratropium **AND** MDI Treatment, sodium chloride, sodium chloride flush, acetaminophen **OR** acetaminophen, glucose, dextrose, glucagon (rDNA), dextrose, ondansetron, guaiFENesin-dextromethorphan, sodium chloride    Labs reviewed:  CBC:   Recent Labs     01/11/21  0611 01/12/21  0748 01/13/21  0622   WBC 17.8* 12.3* 9.3   HGB 11.8* 9.6* 10.2*   HCT 36.3 30.1* 31.7*   MCV 92.2 93.4 93.6    188 220     BMP:   Recent Labs     01/11/21  0611 01/12/21  0748 01/13/21  0622    142 142   K 4.0 3.5 3.9    111* 109   CO2 27 22 23   BUN 32* 23* 27*   CREATININE 1.0 0.8 0.7     LIVER PROFILE:   Recent Labs     01/11/21  0611 01/12/21  0748 01/13/21  0622   AST 18 19 16   ALT 15 15 13   BILITOT <0.2 0.3 0.3   ALKPHOS 96 95 80     PT/INR:   Recent Labs     01/11/21  0611   PROTIME 13.2   INR 1.14     APTT:   Recent Labs     01/11/21 0611   APTT 31.8     UA:  No results for input(s): NITRITE, COLORU, PHUR, LABCAST, WBCUA, RBCUA, MUCUS, TRICHOMONAS, YEAST, BACTERIA, CLARITYU, SPECGRAV, LEUKOCYTESUR, UROBILINOGEN, BILIRUBINUR, BLOODU, GLUCOSEU, AMORPHOUS in the last 72 hours. Invalid input(s): Nita Peabody  No results for input(s): PH, PCO2, PO2 in the last 72 hours. Films:  Radiology Review:  Pertinent images / reports were reviewed as a part of this visit. XR CHEST PORTABLE  Narrative: EXAMINATION:  ONE XRAY VIEW OF THE CHEST    1/12/2021 11:55 am    COMPARISON:  Chest CT 01/10/2021, chest x-ray 12/28/2020    HISTORY:  ORDERING SYSTEM PROVIDED HISTORY: Increase Oxygen Demand  TECHNOLOGIST PROVIDED HISTORY:  Reason for exam:->Increase Oxygen Demand    FINDINGS:  Heart size stable. Diffuse bilateral ground-glass and airspace disease not  significantly changed when compared with the chest CT. No pneumothorax or  evidence of significant pleural effusion  Impression: Stable diffuse bilateral lung disease compatible with pneumonia and/or edema         This note was transcribed using [id number]. Please disregard any translational errors.     Thank you for this consult,    Edna Rand [address] [place] [facility], [facility] Medicine

## 2021-01-13 NOTE — CARE COORDINATION
Per chart review - patient oxygen demands improving. Currently on 10L O2 high flow NC, down from 30L vapotherm. Continue to monitor O2 and discharge needs. Planning on home at discharge.  Electronically signed by Leticia Moreno RN Case Management on 1/13/2021 at 10:01 AM

## 2021-01-13 NOTE — PROGRESS NOTES
Infectious Disease Follow up Notes  Admit Date: 12/28/2020  Hospital Day: 17    Antibiotics :   IV Cefepime     CHIEF COMPLAINT:     COVID 19 PNA  Hypoxic resp failure  WBC elevation  Sepsis  UTI  Bacteremia      Subjective interval History :  63 y.o. woman with a past history of breast cancer, depression, hypertension, obesity, scoliosis, thyroid disease surgical history include hysterectomy breast biopsy back surgery admitted to [facility] [place] on 12/29/2020 with cough shortness of breath diagnosed with COVID-19 pneumonia. She was in acute hypoxic respiratory failure requiring high heated flow oxygenation. She completed a course of IV remdesivir dexamethasone and also received convalescent plasma during this hospitalization. She now developed a WBC elevation with elevated procalcitonin, hence blood culture and urine culture were obtained. Blood cultures from 1/10/2021 no isolated Klebsiella pneumonia as well as urine culture positive for Klebsiella pneumonia. She was started on IV antibiotics given the ongoing sepsis with WBC elevation we are consulted for recommendations. She is still currently receiving 15 L of heated high flow nasal cannula for COVID-19 pneumonia. CT chest from 1/10/2021 still indicates ongoing extensive groundglass opacities in both lung consistent with COVID-19 infection.     Interval History : Remains short of breath using heated high flow nasal cannula weaned down to 10 L, tolerating antibiotic therapy okay. Intermittent hypoxia noted. Fever seems to have improved. WBC still elevated.       Past Medical History:    Past Medical History:   Diagnosis Date    Arthritis     Breast cancer ([address]     Depression     Hypertension     Obesity     Peptic ulcer disease     Scoliosis     Skin cancer     basal cell    Thyroid disease        Past Surgical History:    Past Surgical History:   Procedure Laterality Date    BACK SURGERY      BREAST BIOPSY      BREAST LUMPECTOMY      CARPAL TUNNEL RELEASE      DILATION AND CURETTAGE OF UTERUS      HYSTERECTOMY      OVARY REMOVAL      THYROID SURGERY         Current Medications:    Outpatient Medications Marked as Taking for the 12/28/20 encounter [facility] Encounter)   Medication Sig Dispense Refill    lisinopril (PRINIVIL;ZESTRIL) 30 MG tablet Take 30 mg by mouth daily      metoprolol succinate (TOPROL XL) 100 MG extended release tablet Take 100 mg by mouth daily      amLODIPine (NORVASC) 5 MG tablet Take 5 mg by mouth daily      dicyclomine (BENTYL) 20 MG tablet Take 20 mg by mouth 3 times daily as needed      tiZANidine (ZANAFLEX) 2 MG tablet Take 4 mg by mouth nightly      anastrozole (ARIMIDEX) 1 MG tablet TAKE 1 TABLET BY MOUTH DAILY 30 tablet 5    alendronate (FOSAMAX) 70 MG tablet Take 1 tablet by mouth every 7 days 4 tablet 3    Fexofenadine HCl (ALLEGRA PO) Take 1 tablet by mouth daily as needed       Calcium Carbonate-Vit D-Min (CALCIUM 1200 PO) Take by mouth Daily       Multiple Vitamins-Minerals (CENTRUM PO) Take by mouth      simvastatin (ZOCOR) 40 MG tablet Take 40 mg by mouth nightly      levothyroxine (LEVOTHROID) 75 MCG tablet Take 75 mcg by mouth Daily      DULoxetine (CYMBALTA) 60 MG extended release capsule Take 60 mg by mouth daily      metFORMIN (GLUCOPHAGE) 500 MG tablet Take 1,000 mg by mouth 2 times daily (with meals)       aspirin 81 MG tablet Take 81 mg by mouth daily      naproxen (NAPROSYN) 375 MG tablet Take 375 mg by mouth as needed       clobetasol (TEMOVATE) 0.05 % ointment Apply topically once a week Apply topically 2 times daily.          Allergies:  Mercury    Immunizations :   Immunization History   Administered Date(s) Administered    Pneumococcal Conjugate 13-valent Katelyn Batres) 11/23/2015       Social History:    Social History     Tobacco Use    Smoking status: Never Smoker    Smokeless tobacco: Never Used   Substance Use Topics    Alcohol use: Not Currently    Drug use: Never     Social History     Tobacco Use   Smoking Status Never Smoker   Smokeless Tobacco Never Used      Family History : no DVT no COPD       REVIEW OF SYSTEMS:     Constitutional:  fevers + , chills, night sweats  Eyes:  negative for blurred vision, eye discharge, visual disturbance   HEENT:  negative for hearing loss, ear drainage,nasal congestion  Respiratory:  cough+ , shortness of breath +  or hemoptysis   Cardiovascular:  negative for chest pain, palpitations, syncope  Gastrointestinal:  negative for nausea, vomiting, diarrhea, constipation, abdominal pain  Genitourinary:  negative for frequency, dysuria, urinary incontinence, hematuria  Hematologic/Lymphatic:  negative for easy bruising, bleeding and lymphadenopathy  Allergic/Immunologic:  negative for recurrent infections, angioedema, anaphylaxis   Endocrine:  negative for weight changes, polyuria, polydipsia and polyphagia  Musculoskeletal:  negative for joint  pain, swelling, decreased range of motion  Integumentary: No rashes, skin lesions  Neurological:  negative for headaches, slurred speech, unilateral weakness  Psychiatric: negative for hallucinations,confusion,agitation. PHYSICAL EXAM:      Vitals:    BP (!) 140/90   Pulse 95   Temp 98 °F (36.7 °C) (Oral)   Resp 21   Ht 5' 6\" (1.676 m)   Wt 183 lb 4.8 oz (83.1 kg)   SpO2 90%   BMI 29.59 kg/m²     PHYSICAL EXAM:     In-person bedside physical examination deferred. Pursuant to the emergency declaration under the [address] [place] [address], [address] [place] [address] and the [employer] Act, this clinical encounter was conducted to provide necessary medical care.    (Also consistent with new provisions and guidance offered by [facility] on March 18, 2020 in setting of COVID 19 outbreak and in order to minimize exposures in accordance with the flexibilities announced by CMS on March 30, 2020). In person physical examination was not conducted at bedside in this patient in [place] [address] isolation room to avoid unnecessary exposures, as it will not change my management plan for this patient. References: https://[facility]. [facility]/Portals/0/Resources/COVID-19/3_18%20Telemed%20Guidance%20Updated%20March%2018. pdf?[id number]                      https://[facility]. [facility]/Portals/0/Resources/COVID-19/3_18%20Telemed%20Guidance%20Updated%20March%2018. pdf?ezt=[id number]                      [email or web address] pdf                            Pulmonary: deferred  Abdomen/GI: deferred  Neuro: deferred  Skin: deferred  Musculoskeletal:  deferred  Genitourinary: Deferred  Psych: deferred  Lymphatic/Immunologic: deferred         Data Review:    CBC:   Lab Results   Component Value Date    WBC 9.3 01/13/2021    HGB 10.2 (L) 01/13/2021    HCT 31.7 (L) 01/13/2021    MCV 93.6 01/13/2021     01/13/2021     RENAL:   Lab Results   Component Value Date    CREATININE 0.7 01/13/2021    BUN 27 (H) 01/13/2021     01/13/2021    K 3.9 01/13/2021     01/13/2021    CO2 23 01/13/2021     SED RATE: No results found for: SEDRATE  CK: No results found for: CKTOTAL  CRP:   Lab Results   Component Value Date    .8 01/12/2021     Hepatic Function Panel:   Lab Results   Component Value Date    ALKPHOS 80 01/13/2021    ALT 13 01/13/2021    AST 16 01/13/2021    PROT 5.2 01/13/2021    BILITOT 0.3 01/13/2021    LABALBU 2.2 01/13/2021     UA:  Lab Results   Component Value Date    COLORU DK YELLOW 01/10/2021    CLARITYU TURBID 01/10/2021    GLUCOSEU Negative 01/10/2021    BILIRUBINUR SMALL 01/10/2021    KETUA Negative 01/10/2021    SPECGRAV 1.021 01/10/2021    BLOODU LARGE 01/10/2021    PHUR 5.5 01/10/2021    PROTEINU 100 01/10/2021    UROBILINOGEN 0.2 01/10/2021    NITRU Negative 01/10/2021 LEUKOCYTESUR MODERATE 01/10/2021    LABMICR YES 01/10/2021    URINETYPE NotGiven 01/10/2021      Urine Microscopic:   Lab Results   Component Value Date    BACTERIA 4+ 01/10/2021    COMU see below 01/10/2021    WBCUA 153 01/10/2021    RBCUA 34 01/10/2021    EPIU 1 01/10/2021     Urine Reflex to Culture: No results found for: URRFLXCULT      MICRO: cultures reviewed and updated by me   Blood Culture:          Culture, Urine [[id number]] (Abnormal)  Collected: 01/10/21 1130   Order Status: Completed Specimen: Urine, clean catch Updated: 01/12/21 0758    Organism Klebsiella pneumoniaeAbnormal     Urine Culture, Routine >100,000 CFU/ml   Narrative:     ORDER#: [id number]                          ORDERED BY: Valentino Dubois   SOURCE: Urine Clean Catch                  COLLECTED:  01/10/21 11:30   ANTIBIOTICS AT [facility]. :                      RECEIVED :  01/10/21 11:49   Performed at:   [facility]   [address] [facility] [place] [place] [facility] [facility] [address]   Phone [phone]   Culture, Blood 2 [[id number]] (Abnormal) Collected: 01/10/21 1541   Order Status: Completed Specimen: Blood Updated: 01/12/21 0725    Culture, Blood 2 --Abnormal     Gram stain Aerobic bottle:   Gram negative rods   Information to follow   Gram stain Anaerobic bottle:   Gram negative rods   Information to follow   Abnormal     Organism Klebsiella pneumoniaeAbnormal     Culture, Blood 2 --    POSITIVE for   No further workup   Refer to culture [id number] for sensitivity results    Isolated two of two sets    Narrative:     ORDER#: [id number]                          ORDERED BY: Valentino Dubois   SOURCE: Blood                              COLLECTED:  01/10/21 15:41   ANTIBIOTICS AT [facility] :                      RECEIVED :  01/10/21 15:41   CALL  [facility]  U0 tel. [phone],   Previous panic on this admission - call not needed per SOP, 01/11/2021 07:03,   by Sameera Valverde   If child <=2 yrs old please draw pediatric bottle. ~Blood Culture #2 Performed at:   [address] [address]   Phone [phone]   Culture, Blood 1 [[id number]] (Abnormal) Collected: 01/10/21 1310   Order Status: Completed Specimen: Blood Updated: 01/12/21 0724    Blood Culture, Routine --Abnormal     Gram stain Aerobic bottle:   Gram negative rods   Information to follow   Gram stain Anaerobic bottle:   Gram negative rods   Information to follow   Abnormal     Organism Klebsiella pneumoniae DNA DetectedAbnormal     Blood Culture, Routine See additional report for complete BCID panel. Organism Klebsiella pneumoniaeAbnormal     Blood Culture, Routine --    POSITIVE for   Sensitivity to follow    Isolated two of two sets    Narrative:     ORDER#: [id number]                          ORDERED BY: Jeff Monday   SOURCE: Blood                              COLLECTED:  01/10/21 13:10   ANTIBIOTICS AT [facility] :                      RECEIVED :  01/10/21 13:17   CALL  [facility]  [address] [place] [phone], [phone]   Microbiology results called to and read back by pharmacist Ry Plummer,   01/11/2021 07:07, by [facility]   Microbiology results called to and read back by RN Amelia Larsen, 01/11/2021   07:07, by [facility]   If child <=2 yrs old please draw pediatric bottle. ~Blood Culture 1   Performed at:   [address] [address]   Phone [phone]   Culture, Blood 1 [[id number]]    Order Status: Sent Specimen: Blood    Culture, Blood 2 [[id number]]    Order Status: Sent Specimen: Blood    MRSA DNA Probe, Nasal [[id number]] Collected: 01/10/21 1400   Order Status: Completed Specimen: Nares Updated: 01/11/21 2251    MRSA SCREEN RT-PCR --    Negative  MRSA DNA not detected. Normal Range: Not detected    Narrative:     ORDER#: [id number]                          ORDERED BY: Jeff Monday   SOURCE: Nares                              COLLECTED:  01/10/21 14:00   ANTIBIOTICS AT [facility] :                      RECEIVED :  01/10/21 14:03   Performed at:   [facility]   [address] [facility] [place] [address]   Phone [phone]   Strep Pneumoniae Antigen [[id number]] Collected: 01/10/21 1130   Order Status: Completed Specimen: Urine, clean catch Updated: 01/11/21 1347    STREP PNEUMONIAE ANTIGEN, URINE --    Presumptive Negative   Presumptive negative suggests no current or recent   pneumococcal infection. Infection due to Strep pneumoniae   cannot be ruled out since the antigen present in the sample   may be below the detection limit of the test.   Normal Range:Presumptive Negative    Narrative:     ORDER#: [id number]                          ORDERED BY: Walter Rodriguez   SOURCE: Urine Clean Catch                  COLLECTED:  01/10/21 11:30   ANTIBIOTICS AT [facility] :                      RECEIVED :  01/10/21 11:49   Performed at:   [address] [place] [address]   Phone [phone]   Legionella Antigen, Urine [[id number]] Collected: 01/10/21 1130   Order Status: Completed Specimen: Urine, clean catch Updated: 01/11/21 1346    L. pneumophila Serogp 1 Ur Ag --    Presumptive Negative   No Legionella pneumophila serogroup 1 antigens detected. A negative result does not exclude infection with   Legionella pneumophila serogroup 1 nor does it rule out   other microbial-caused respiratory infections or   disease caused by other serogroups of   Legionella pneumophila. Normal Range: Presumptive Negative    Narrative:     ORDER#: [id number]                          ORDERED BY: Walter Rodriguez   SOURCE: Urine Clean Catch                  COLLECTED:  01/10/21 11:30   ANTIBIOTICS AT [facility] :                      RECEIVED :  01/10/21 11:49   Performed at:   [address] [place] [address]   Phone [phone]   Culture, Blood, PCR ID Panel Results Report [[id number]] Collected: 01/10/21 1310   Order Status: Completed Updated: 01/11/21 0709    Report SEE IMAGE   Narrative:     CALL America Villalobos [phone],  Alma Cifuentes   Microbiology results called to and read back by pharmacist Tanna Cates,   01/11/2021 07:07, by [facility]   Microbiology results called to and read back by RN Dae Pop, 01/11/2021   07:07, by [facility]   Referred out by:   [facility]   [address] [facility] [place] [address]   Phone [phone]   Culture, Respiratory [[id number]]    Order Status: No result Specimen: Sputum Expectorated    Culture, Blood 1 [[id number]] Collected: 12/28/20 2140   Order Status: Completed Specimen: Blood Updated: 01/02/21 1415    Blood Culture, Routine No Growth after 4 days of incubation. Narrative:     ORDER#: [id number]                          ORDERED BY: Ashutosh Adam   SOURCE: Blood Antecubital-Lef              COLLECTED:  12/28/20 21:40   ANTIBIOTICS AT [facility] :                      RECEIVED :  12/29/20 13:55   If child <=2 yrs old please draw pediatric bottle. ~Blood Culture #1   Performed at:   [facility]   [address] [facility] [place] [address]   Phone [phone]   Culture, Blood 2 [[id number]] Collected: 12/28/20 2215   Order Status: Completed Specimen: Blood Updated: 01/02/21 1415    Culture, Blood 2 No Growth after 4 days of incubation. Narrative:     ORDER#: [id number]                          ORDERED BY: Ashutosh Adam   SOURCE: Blood Antecubital-Rig              COLLECTED:  12/28/20 22:15   ANTIBIOTICS AT [facility] :                      RECEIVED :  12/29/20 13:55   If child <=2 yrs old please draw pediatric bottle. ~Blood Culture #2   Performed at:   [facility]   [address] [facility] [place] [address]   Phone [phone]     Klebsiella pneumoniae (1)    Antibiotic Interpretation MARIA ELENA Status    ampicillin Resistant >=32 mcg/mL     ceFAZolin Sensitive <=4 mcg/mL chloride      Assessment:     Patient Active Problem List   Diagnosis    Malignant neoplasm of central portion of right female breast ([facility]    Encounter for consultation    Encounter for antineoplastic radiation therapy    ER+ (estrogen receptor positive status)    HER2-negative carcinoma of breast ([facility]    Use of anastrozole (Arimidex)    Visit for monitoring Arimidex therapy    Osteopenia    Pneumonia due to organism    Suspected COVID-19 virus infection    Hypothyroidism    HLD (hyperlipidemia)    PCOS (polycystic ovarian syndrome)    Acute respiratory failure with hypoxia ([facility]    2019 novel coronavirus-infected pneumonia ([facility])    Elevated LDH    Elevated AST (SGOT)    Elevated ferritin    Elevated d-dimer    History of depression    Essential hypertension    Class 2 obesity due to excess calories with body mass index (BMI) of 35.0 to 35.9 in adult    Pneumonia due to COVID-19 virus    Hyperglycemia    Leukocytosis       Sepsis  Klebsiella pneumonia urinary tract infection  Urinalysis very abnormal from 1/10/2021  Klebsiella pneumoniae bacteremia  WBC elevation  COVID-19 pneumonia  Acute hypoxic respiratory failure  CT chest with bilateral multifocal pneumonia  Elevated D-dimer elevated ferritin  History of breast cancer  History of diabetes  BMI 29.36  Elevated procalcitonin  Lactic acidosis   Elevated CRP      She continues to require high flow oxygen supplementation for acute hypoxic respiratory failure from COVID-19 pneumonia. Unfortunately she now developed sepsis secondary to urinary tract infection with Klebsiella in the urine as well as in the bloodstream.  Will await sensitivities on the culture to adjust antibiotic therapy. Agree with IV antibiotics at this time. She completed a course of remdesivir dexamethasone and plasma for her COVID-19 pneumonia. Unfortunately  CT scan of the chest with  still ongoing changes secondary to COVID-19.     Procalcitonin slowly trending down CRP improving on antibiotic therapy. Follow blood cultures in process. Labs, Microbiology, Radiology and all the pertinent results from current hospitalization and  care every where were reviewed  by me as a part of the evaluation   Plan:   1. Cont IV Cefepime x 2 gm q 12 hrs will cover Bacteremia, UTI and PNA   2. Klebsiella sensitivities noted   3. Repeat Blood cx  on 1/13   4. Cont supportive care  5. She completed the Treatment course for COVID and now on supportive care with Oxygen supplementation  6. CRP, D dimer elevation now likely from sepsis    7. On steroids per Pulmonary  8. Has oral thrush - Fluconaozle oral       Discussed with patient/Family and Nursing staff   Risk of Complications/Morbidity: High      · Illness(es)/ Infection present that pose threat to bodily function. · There is potential for severe exacerbation of infection/side effects of treatment. · Therapy requires intensive monitoring for antimicrobial agent toxicity. Thanks for allowing me to participate in your patient's care and please call me with any questions or concerns.     Elvera Goltz MD  Infectious Disease  [facility]) Physician  Phone: [phone]   Fax : [phone]

## 2021-01-13 NOTE — PROGRESS NOTES
Hospitalist Progress Note      PCP: Henrietta Jiménez MD    Date of Admission: 12/28/2020    Subjective: O2 requirment increased from 15-->30 fiO2    Medications:  Reviewed    Infusion Medications    sodium chloride 100 mL/hr at 01/12/21 0701    sodium chloride      dextrose       Scheduled Medications    predniSONE  60 mg Oral Daily    fluconazole  100 mg Oral Daily    lidocaine 1 % injection  5 mL Intradermal Once    sodium chloride flush  10 mL Intravenous 2 times per day    cefepime  2 g Intravenous Q12H    senna  1 tablet Oral Nightly    polyethylene glycol  17 g Oral Daily    budesonide-formoterol  2 puff Inhalation BID    enoxaparin  40 mg Subcutaneous BID    [Held by provider] amLODIPine  5 mg Oral Daily    [Held by provider] lisinopril  30 mg Oral Daily    [Held by provider] metoprolol tartrate  100 mg Oral BID    sodium chloride flush  10 mL Intravenous 2 times per day    aspirin  81 mg Oral Daily    levothyroxine  75 mcg Oral Daily    atorvastatin  40 mg Oral Daily    insulin lispro  0-6 Units Subcutaneous TID WC    insulin lispro  0-3 Units Subcutaneous Nightly    anastrozole  1 mg Oral Daily    DULoxetine  60 mg Oral Daily     PRN Meds: sodium chloride flush, LORazepam, albuterol sulfate HFA **AND** ipratropium **AND** MDI Treatment, sodium chloride, sodium chloride flush, acetaminophen **OR** acetaminophen, glucose, dextrose, glucagon (rDNA), dextrose, ondansetron, guaiFENesin-dextromethorphan, sodium chloride      Intake/Output Summary (Last 24 hours) at 1/13/2021 0031  Last data filed at 1/12/2021 2200  Gross per 24 hour   Intake 2488 ml   Output 1300 ml   Net 1188 ml       Physical Exam Performed:    /84   Pulse 99   Temp 98.4 °F (36.9 °C) (Oral)   Resp 22   Ht 5' 6\" (1.676 m)   Wt 181 lb 14.1 oz (82.5 kg)   SpO2 94%   BMI 29.36 kg/m²        General appearance: No apparent distress, alert and cooperative.   HEENT: Conjunctivae/corneas clear, neck supple w/ full ROM  Respiratory:  Normal respiratory effort. Faint bilateral rales  Cardiovascular: Regular rate and rhythm, normal S1/S2, no murmurs  Abdomen: Soft, non-tender, non-distended with normal bowel sounds. Musculoskeletal: No edema bilaterally  Neurologic:  No new focal deficits  Psychiatric: Alert and oriented, normal insight  Capillary Refill: Brisk,< 3 seconds   Peripheral Pulses: +2 palpable, equal bilaterally        Labs:   Recent Labs     01/10/21  1154 01/11/21  0611 01/12/21  0748   WBC 33.1* 17.8* 12.3*   HGB 11.9* 11.8* 9.6*   HCT 38.1 36.3 30.1*    207 188     Recent Labs     01/10/21  0611 01/11/21  0611 01/12/21  0748    140 142   K 4.2 4.0 3.5    103 111*   CO2 25 27 22   BUN 35* 32* 23*   CREATININE 1.2 1.0 0.8   CALCIUM 9.2 8.9 8.1*     Recent Labs     01/10/21  0611 01/11/21  0611 01/12/21  0748   AST 18 18 19   ALT 14 15 15   BILITOT 0.5 <0.2 0.3   ALKPHOS 96 96 95     Recent Labs     01/10/21  0611 01/11/21  0611   INR 1.14 1.14     No results for input(s): Noem[facility] Mcleod in the last 72 hours. Urinalysis:      Lab Results   Component Value Date    NITRU Negative 01/10/2021    WBCUA 153 01/10/2021    BACTERIA 4+ 01/10/2021    RBCUA 34 01/10/2021    BLOODU LARGE 01/10/2021    SPECGRAV 1.021 01/10/2021    GLUCOSEU Negative 01/10/2021       Radiology:  XR CHEST PORTABLE   Final Result   Stable diffuse bilateral lung disease compatible with pneumonia and/or edema         CT CHEST PULMONARY EMBOLISM W CONTRAST   Final Result   No evidence of pulmonary embolism. Mildly dilated and atherosclerotic thoracic aorta with no aneurysm or   dissection. Extensive ground-glass opacities throughout both lungs some and subsegmental   bibasilar opacities which could represent extensive multisegmental   bronchopneumonia and/or pulmonary edema vs pulmonary hemorrhage. Small left pleural effusion.       Small lymph nodes scattered in the mediastinum which are not pathologic by size criteria. Moderate degenerative changes and postop changes in the spine      9 mm hypodensity left lobe liver which could represent a cyst but is   ill-defined. Suggest ultrasound correlation.          XR CHEST PORTABLE   Final Result   Bilateral ill-defined airspace opacities could represent atypical pneumonia,   multifocal bacterial pneumonia or subsegmental atelectasis                 Assessment/Plan:    Active Hospital Problems    Diagnosis    Pneumonia due to COVID-19 virus [U07.1, J12.82]    Hyperglycemia [R73.9]    Leukocytosis [D72.829]    Acute respiratory failure with hypoxia ([address] [J96.01]    2019 novel coronavirus-infected pneumonia ([facility]) [U07.1, J12.82]    Elevated LDH [R74.02]    Elevated AST (SGOT) [R74.01]    Elevated ferritin [R79.89]    Elevated d-dimer [R79.89]    History of depression [Z86.59]    Essential hypertension [I10]    Class 2 obesity due to excess calories with body mass index (BMI) of 35.0 to 35.9 in adult [E66.09, Z68.35]    Suspected COVID-19 virus infection [Z20.822]    Hypothyroidism [E03.9]    HLD (hyperlipidemia) [E78.5]    PCOS (polycystic ovarian syndrome) [E28.2]    Pneumonia due to organism [J18.9]         COVID 19 PNA - improved  - s/p Increased to 20 mg x 5 days then 10 mg x 5 days  - completed remdesivir day 5/5  - s/p convalescent plasma 12/30/2020  - monitor inflammatory markers  - added Symbicort and continue albuterol/ipatropium  - PLEASE AVOID NARCOTICS     Sepsis 2/2 Klebsiella UTI, Klebsiella bacteremia  Meets at least 2 SIRS Criteria:  Tachypnea > 20  HR > 90  WBC > 12K  Source: urine  -lactic acid q6h  -blood cultures positive for Klebsiella   -repeat blood cultures 1/11  -IV antibiotics: Vanc and cefepime -> cefepime  -IVF: 30 cc/kg given, followed by maintenance ivf  -WBC count and procalcitonin improved  -though she has been on high dose steroids, my concern is with a secondary bacterial process  -CTA Chest with contrast showed extensive ground-glass opacities throughout both lungs, small left pleural effusion, and a 9 mm hypodensity left lobe of the liver that could represent a cyst  -ID consulted, recs appreciated     Liver lesion  - consider CT Ab/Pelvis or abdominal ultrasound inpt vs outpt     Acute respiratory failure with hypoxia -   - improved, now on vapotherm at 30L-->15L-->30L this am   - consulted pulmonology      HTN - elevated  - c/w bb, acei  - resume home norvasc     Hx breast cancer  - c/w arimidex     HLD  - c/w statin     Hypothroidism  - c/w levothyroxine      DM 2  - hold metformin  - ssi, monitor on steroids  - a1c - 6.0     Depression  - c/w home meds     Constipation  - received Dulcolax suppository and enema  - continue Miralax  - add Senna nightly       DVT Prophylaxis: lovenox bid  Diet: DIET LOW SODIUM 2 GM; Carb Control: 4 carb choices (60 gms)/meal  Dietary Nutrition Supplements: Standard High Calorie Oral Supplement  Code Status: Full Code    PT/OT Eval Status: ordered    [facility]    Violeta Mckinney MD

## 2021-01-13 NOTE — PROGRESS NOTES
Occupational Therapy  Unable to see pt at this time due to pt declining all OT treatment at this time. Pt educated on importance of activity and increasing strength/endurance with goal of returning home. Pt continues to decline at this time. Pt report \"the doctor told me just to rest today, I'll do therapy tomorrow\". Will follow up with pt as time allows. Continue with POC.     Sylvia Martinez, OTR/L

## 2021-01-14 ENCOUNTER — APPOINTMENT (OUTPATIENT)
Dept: GENERAL RADIOLOGY | Age: 66
DRG: 177 | End: 2021-01-14
Payer: MEDICARE

## 2021-01-14 LAB
A/G RATIO: 0.7 (ref 1.1–2.2)
ABO/RH: NORMAL
ALBUMIN SERPL-MCNC: 1.6 G/DL (ref 3.4–5)
ALP BLD-CCNC: 56 U/L (ref 40–129)
ALT SERPL-CCNC: 16 U/L (ref 10–40)
ANION GAP SERPL CALCULATED.3IONS-SCNC: 7 MMOL/L (ref 3–16)
ANTIBODY SCREEN: NORMAL
AST SERPL-CCNC: 20 U/L (ref 15–37)
BASOPHILS ABSOLUTE: 0 K/UL (ref 0–0.2)
BASOPHILS RELATIVE PERCENT: 0.2 %
BILIRUB SERPL-MCNC: <0.2 MG/DL (ref 0–1)
BLOOD BANK DISPENSE STATUS: NORMAL
BLOOD BANK PRODUCT CODE: NORMAL
BLOOD CULTURE, ROUTINE: ABNORMAL
BLOOD CULTURE, ROUTINE: ABNORMAL
BPU ID: NORMAL
BUN BLDV-MCNC: 44 MG/DL (ref 7–20)
C-REACTIVE PROTEIN: 36.6 MG/L (ref 0–5.1)
CALCIUM SERPL-MCNC: 7.5 MG/DL (ref 8.3–10.6)
CHLORIDE BLD-SCNC: 113 MMOL/L (ref 99–110)
CO2: 22 MMOL/L (ref 21–32)
CREAT SERPL-MCNC: 0.9 MG/DL (ref 0.6–1.2)
CULTURE, BLOOD 2: ABNORMAL
D DIMER: 2509 NG/ML DDU (ref 0–229)
DESCRIPTION BLOOD BANK: NORMAL
EKG ATRIAL RATE: 130 BPM
EKG DIAGNOSIS: NORMAL
EKG P AXIS: 33 DEGREES
EKG P-R INTERVAL: 122 MS
EKG Q-T INTERVAL: 304 MS
EKG QRS DURATION: 74 MS
EKG QTC CALCULATION (BAZETT): 447 MS
EKG R AXIS: 17 DEGREES
EKG T AXIS: 61 DEGREES
EKG VENTRICULAR RATE: 130 BPM
EOSINOPHILS ABSOLUTE: 0 K/UL (ref 0–0.6)
EOSINOPHILS RELATIVE PERCENT: 0.2 %
GFR AFRICAN AMERICAN: >60
GFR NON-AFRICAN AMERICAN: >60
GLOBULIN: 2.4 G/DL
GLUCOSE BLD-MCNC: 100 MG/DL (ref 70–99)
GLUCOSE BLD-MCNC: 119 MG/DL (ref 70–99)
GLUCOSE BLD-MCNC: 131 MG/DL (ref 70–99)
GLUCOSE BLD-MCNC: 213 MG/DL (ref 70–99)
GLUCOSE BLD-MCNC: 227 MG/DL (ref 70–99)
GLUCOSE BLD-MCNC: 82 MG/DL (ref 70–99)
HCT VFR BLD CALC: 20.3 % (ref 36–48)
HCT VFR BLD CALC: 21.4 % (ref 36–48)
HCT VFR BLD CALC: 24.9 % (ref 36–48)
HEMOGLOBIN: 6.5 G/DL (ref 12–16)
HEMOGLOBIN: 7.2 G/DL (ref 12–16)
HEMOGLOBIN: 8.1 G/DL (ref 12–16)
INR BLD: 1.11 (ref 0.86–1.14)
LACTIC ACID: 3.7 MMOL/L (ref 0.4–2)
LYMPHOCYTES ABSOLUTE: 1.1 K/UL (ref 1–5.1)
LYMPHOCYTES RELATIVE PERCENT: 7.7 %
MCH RBC QN AUTO: 30.5 PG (ref 26–34)
MCHC RBC AUTO-ENTMCNC: 32.2 G/DL (ref 31–36)
MCV RBC AUTO: 94.7 FL (ref 80–100)
MONOCYTES ABSOLUTE: 0.9 K/UL (ref 0–1.3)
MONOCYTES RELATIVE PERCENT: 6.2 %
NEUTROPHILS ABSOLUTE: 12 K/UL (ref 1.7–7.7)
NEUTROPHILS RELATIVE PERCENT: 85.7 %
ORGANISM: ABNORMAL
PDW BLD-RTO: 15.2 % (ref 12.4–15.4)
PERFORMED ON: ABNORMAL
PERFORMED ON: NORMAL
PLATELET # BLD: 280 K/UL (ref 135–450)
PMV BLD AUTO: 9.6 FL (ref 5–10.5)
POTASSIUM REFLEX MAGNESIUM: 3.8 MMOL/L (ref 3.5–5.1)
PROCALCITONIN: 3.81 NG/ML (ref 0–0.15)
PROTHROMBIN TIME: 12.9 SEC (ref 10–13.2)
RBC # BLD: 2.14 M/UL (ref 4–5.2)
SODIUM BLD-SCNC: 142 MMOL/L (ref 136–145)
TOTAL PROTEIN: 4 G/DL (ref 6.4–8.2)
TROPONIN: <0.01 NG/ML
WBC # BLD: 14 K/UL (ref 4–11)

## 2021-01-14 PROCEDURE — 86900 BLOOD TYPING SEROLOGIC ABO: CPT

## 2021-01-14 PROCEDURE — 85018 HEMOGLOBIN: CPT

## 2021-01-14 PROCEDURE — 85610 PROTHROMBIN TIME: CPT

## 2021-01-14 PROCEDURE — C9113 INJ PANTOPRAZOLE SODIUM, VIA: HCPCS | Performed by: INTERNAL MEDICINE

## 2021-01-14 PROCEDURE — 2500000003 HC RX 250 WO HCPCS: Performed by: INTERNAL MEDICINE

## 2021-01-14 PROCEDURE — 6370000000 HC RX 637 (ALT 250 FOR IP): Performed by: INTERNAL MEDICINE

## 2021-01-14 PROCEDURE — 6370000000 HC RX 637 (ALT 250 FOR IP): Performed by: FAMILY MEDICINE

## 2021-01-14 PROCEDURE — 76937 US GUIDE VASCULAR ACCESS: CPT

## 2021-01-14 PROCEDURE — 93010 ELECTROCARDIOGRAM REPORT: CPT | Performed by: INTERNAL MEDICINE

## 2021-01-14 PROCEDURE — 85014 HEMATOCRIT: CPT

## 2021-01-14 PROCEDURE — 6360000002 HC RX W HCPCS: Performed by: INTERNAL MEDICINE

## 2021-01-14 PROCEDURE — 86850 RBC ANTIBODY SCREEN: CPT

## 2021-01-14 PROCEDURE — 6360000002 HC RX W HCPCS: Performed by: STUDENT IN AN ORGANIZED HEALTH CARE EDUCATION/TRAINING PROGRAM

## 2021-01-14 PROCEDURE — 85379 FIBRIN DEGRADATION QUANT: CPT

## 2021-01-14 PROCEDURE — 36415 COLL VENOUS BLD VENIPUNCTURE: CPT

## 2021-01-14 PROCEDURE — 86923 COMPATIBILITY TEST ELECTRIC: CPT

## 2021-01-14 PROCEDURE — 71045 X-RAY EXAM CHEST 1 VIEW: CPT

## 2021-01-14 PROCEDURE — 02HV33Z INSERTION OF INFUSION DEVICE INTO SUPERIOR VENA CAVA, PERCUTANEOUS APPROACH: ICD-10-PCS | Performed by: INTERNAL MEDICINE

## 2021-01-14 PROCEDURE — 2700000000 HC OXYGEN THERAPY PER DAY

## 2021-01-14 PROCEDURE — 2580000003 HC RX 258: Performed by: INTERNAL MEDICINE

## 2021-01-14 PROCEDURE — 80053 COMPREHEN METABOLIC PANEL: CPT

## 2021-01-14 PROCEDURE — 84145 PROCALCITONIN (PCT): CPT

## 2021-01-14 PROCEDURE — 86140 C-REACTIVE PROTEIN: CPT

## 2021-01-14 PROCEDURE — 2060000000 HC ICU INTERMEDIATE R&B

## 2021-01-14 PROCEDURE — 2580000003 HC RX 258: Performed by: STUDENT IN AN ORGANIZED HEALTH CARE EDUCATION/TRAINING PROGRAM

## 2021-01-14 PROCEDURE — 83605 ASSAY OF LACTIC ACID: CPT

## 2021-01-14 PROCEDURE — 84484 ASSAY OF TROPONIN QUANT: CPT

## 2021-01-14 PROCEDURE — 86901 BLOOD TYPING SEROLOGIC RH(D): CPT

## 2021-01-14 PROCEDURE — 36430 TRANSFUSION BLD/BLD COMPNT: CPT

## 2021-01-14 PROCEDURE — 6370000000 HC RX 637 (ALT 250 FOR IP): Performed by: STUDENT IN AN ORGANIZED HEALTH CARE EDUCATION/TRAINING PROGRAM

## 2021-01-14 PROCEDURE — 99233 SBSQ HOSP IP/OBS HIGH 50: CPT | Performed by: INTERNAL MEDICINE

## 2021-01-14 PROCEDURE — 36569 INSJ PICC 5 YR+ W/O IMAGING: CPT

## 2021-01-14 PROCEDURE — P9016 RBC LEUKOCYTES REDUCED: HCPCS

## 2021-01-14 PROCEDURE — 94761 N-INVAS EAR/PLS OXIMETRY MLT: CPT

## 2021-01-14 PROCEDURE — 99231 SBSQ HOSP IP/OBS SF/LOW 25: CPT | Performed by: INTERNAL MEDICINE

## 2021-01-14 PROCEDURE — C1751 CATH, INF, PER/CENT/MIDLINE: HCPCS

## 2021-01-14 PROCEDURE — 85025 COMPLETE CBC W/AUTO DIFF WBC: CPT

## 2021-01-14 RX ORDER — SODIUM CHLORIDE 0.9 % (FLUSH) 0.9 %
10 SYRINGE (ML) INJECTION PRN
Status: DISCONTINUED | OUTPATIENT
Start: 2021-01-14 | End: 2021-01-23 | Stop reason: HOSPADM

## 2021-01-14 RX ORDER — PANTOPRAZOLE SODIUM 40 MG/10ML
80 INJECTION, POWDER, LYOPHILIZED, FOR SOLUTION INTRAVENOUS ONCE
Status: COMPLETED | OUTPATIENT
Start: 2021-01-14 | End: 2021-01-14

## 2021-01-14 RX ORDER — SODIUM CHLORIDE 9 MG/ML
10 INJECTION INTRAVENOUS DAILY
Status: DISCONTINUED | OUTPATIENT
Start: 2021-01-14 | End: 2021-01-23 | Stop reason: HOSPADM

## 2021-01-14 RX ORDER — SODIUM CHLORIDE 0.9 % (FLUSH) 0.9 %
10 SYRINGE (ML) INJECTION EVERY 12 HOURS SCHEDULED
Status: DISCONTINUED | OUTPATIENT
Start: 2021-01-14 | End: 2021-01-23 | Stop reason: HOSPADM

## 2021-01-14 RX ORDER — MIDODRINE HYDROCHLORIDE 10 MG/1
10 TABLET ORAL ONCE
Status: COMPLETED | OUTPATIENT
Start: 2021-01-14 | End: 2021-01-14

## 2021-01-14 RX ORDER — 0.9 % SODIUM CHLORIDE 0.9 %
500 INTRAVENOUS SOLUTION INTRAVENOUS ONCE
Status: COMPLETED | OUTPATIENT
Start: 2021-01-14 | End: 2021-01-14

## 2021-01-14 RX ORDER — SODIUM CHLORIDE 9 MG/ML
INJECTION, SOLUTION INTRAVENOUS PRN
Status: DISCONTINUED | OUTPATIENT
Start: 2021-01-14 | End: 2021-01-23 | Stop reason: HOSPADM

## 2021-01-14 RX ORDER — LIDOCAINE HYDROCHLORIDE 10 MG/ML
5 INJECTION, SOLUTION EPIDURAL; INFILTRATION; INTRACAUDAL; PERINEURAL ONCE
Status: COMPLETED | OUTPATIENT
Start: 2021-01-14 | End: 2021-01-14

## 2021-01-14 RX ADMIN — SODIUM CHLORIDE: 9 INJECTION, SOLUTION INTRAVENOUS at 01:28

## 2021-01-14 RX ADMIN — PANTOPRAZOLE SODIUM 8 MG/HR: 40 INJECTION, POWDER, FOR SOLUTION INTRAVENOUS at 15:16

## 2021-01-14 RX ADMIN — Medication 2 PUFF: at 08:34

## 2021-01-14 RX ADMIN — MIDODRINE HYDROCHLORIDE 10 MG: 10 TABLET ORAL at 05:58

## 2021-01-14 RX ADMIN — SODIUM CHLORIDE: 9 INJECTION, SOLUTION INTRAVENOUS at 06:30

## 2021-01-14 RX ADMIN — ATORVASTATIN CALCIUM 40 MG: 40 TABLET, FILM COATED ORAL at 08:23

## 2021-01-14 RX ADMIN — ASPIRIN 81 MG 81 MG: 81 TABLET ORAL at 08:23

## 2021-01-14 RX ADMIN — Medication 2 PUFF: at 20:38

## 2021-01-14 RX ADMIN — PANTOPRAZOLE SODIUM 80 MG: 40 INJECTION, POWDER, FOR SOLUTION INTRAVENOUS at 15:16

## 2021-01-14 RX ADMIN — ANASTROZOLE 1 MG: 1 TABLET ORAL at 08:25

## 2021-01-14 RX ADMIN — SODIUM CHLORIDE, PRESERVATIVE FREE 10 ML: 5 INJECTION INTRAVENOUS at 08:23

## 2021-01-14 RX ADMIN — LEVOTHYROXINE SODIUM 75 MCG: 0.07 TABLET ORAL at 05:58

## 2021-01-14 RX ADMIN — SODIUM CHLORIDE 500 ML: 9 INJECTION, SOLUTION INTRAVENOUS at 05:21

## 2021-01-14 RX ADMIN — ONDANSETRON 4 MG: 2 INJECTION INTRAMUSCULAR; INTRAVENOUS at 03:05

## 2021-01-14 RX ADMIN — LORAZEPAM 0.5 MG: 0.5 TABLET ORAL at 03:05

## 2021-01-14 RX ADMIN — CEFEPIME HYDROCHLORIDE 2 G: 2 INJECTION, POWDER, FOR SOLUTION INTRAVENOUS at 01:28

## 2021-01-14 RX ADMIN — CEFEPIME HYDROCHLORIDE 2 G: 2 INJECTION, POWDER, FOR SOLUTION INTRAVENOUS at 13:59

## 2021-01-14 RX ADMIN — DULOXETINE HYDROCHLORIDE 60 MG: 60 CAPSULE, DELAYED RELEASE ORAL at 08:24

## 2021-01-14 RX ADMIN — Medication 10 ML: at 15:18

## 2021-01-14 RX ADMIN — Medication 10 ML: at 08:21

## 2021-01-14 RX ADMIN — FLUCONAZOLE 100 MG: 100 TABLET ORAL at 08:24

## 2021-01-14 RX ADMIN — PANTOPRAZOLE SODIUM 8 MG/HR: 40 INJECTION, POWDER, FOR SOLUTION INTRAVENOUS at 21:05

## 2021-01-14 RX ADMIN — LIDOCAINE HYDROCHLORIDE 5 ML: 10 INJECTION, SOLUTION EPIDURAL; INFILTRATION; INTRACAUDAL; PERINEURAL at 17:18

## 2021-01-14 RX ADMIN — PREDNISONE 60 MG: 20 TABLET ORAL at 08:23

## 2021-01-14 ASSESSMENT — PAIN SCALES - GENERAL: PAINLEVEL_OUTOF10: 0

## 2021-01-14 NOTE — PROGRESS NOTES
Pt with dark brown emesis and black, thick, foul smelling stool. Pt incont. Pt given a bath and clean linens x2. MD notified. New order for a stat H&H and GI Consult. 02 sat 70% on 9L. 02 increased to 11L then 15L. 02 sat increased 91% on 15L.  Electronically signed by Cj Vyas RN on 1/14/2021 at 12:13 PM

## 2021-01-14 NOTE — PLAN OF CARE
Problem: Nutrition  Goal: Optimal nutrition therapy  1/14/2021 1321 by Karely Arellano RD, LD  Outcome: Ongoing  1/14/2021 0212 by Maxwell Hu RN  Outcome: Ongoing   Nutrition Problem #1: Inadequate oral intake  Intervention: Food and/or Nutrient Delivery: (monitor for restart of nutrition)  Nutritional Goals: Meal and supplement intake greater than 50%

## 2021-01-14 NOTE — PROGRESS NOTES
[facility]  Full note dictated    IMP:  Coffee-ground emesis and melenic stool with a decrease in Hgb - 6.5, was 10.2; BUN increased to 44  Patient on ASA, Lovenox - now held  IV pantoprazole started  This is obviously UGI bleeding  Under ordinary circumstances, the next step would be an EGD  However, the patient has a multifocal pneumonia with COVID-19  She is tachypneic and is dyspneic with simple speech    REC:  Given the above respiratory compromise, endoscopic evaluation does not appear safe at this time  Continue IV pantoprazole and hold the above anticoagulants  Follow the Hgb and transfuse as necessary  If the patient becomes hemodynamically unstable and continues to bleed, we will proceed with an EGD, but the patient would likely require endotracheal intubation  The plan was discussed with the patient

## 2021-01-14 NOTE — PLAN OF CARE
Problem: Pain:  Goal: Pain level will decrease  Description: Pain level will decrease  1/14/2021 0212 by Osiris Allison RN  Outcome: Ongoing  1/13/2021 1950 by Nicho Webb RN  Outcome: Ongoing  Goal: Control of acute pain  Description: Control of acute pain  1/14/2021 0212 by Osiris Allison RN  Outcome: Ongoing  1/13/2021 1950 by Nicho Webb RN  Outcome: Ongoing  Goal: Control of chronic pain  Description: Control of chronic pain  1/14/2021 0212 by Osiris Allison RN  Outcome: Ongoing  1/13/2021 1950 by Nicho Webb RN  Outcome: Ongoing     Problem: Airway Clearance - Ineffective  Goal: Achieve or maintain patent airway  1/14/2021 0212 by Osiris Allison RN  Outcome: Ongoing  1/13/2021 1950 by Nicho Webb RN  Outcome: Ongoing     Problem: Gas Exchange - Impaired  Goal: Absence of hypoxia  1/14/2021 0212 by Osiris Allison RN  Outcome: Ongoing  1/13/2021 1950 by Nicho Webb RN  Outcome: Ongoing  Goal: Promote optimal lung function  1/14/2021 0212 by Osiris Allison RN  Outcome: Ongoing  1/13/2021 1950 by Nicho Webb RN  Outcome: Ongoing     Problem: Breathing Pattern - Ineffective  Goal: Ability to achieve and maintain a regular respiratory rate  1/14/2021 0212 by Osiris Allison RN  Outcome: Ongoing  1/13/2021 1950 by Nicho Webb RN  Outcome: Ongoing     Problem:  Body Temperature -  Risk of, Imbalanced  Goal: Ability to maintain a body temperature within defined limits  1/14/2021 0212 by Osiris Allison RN  Outcome: Ongoing  1/13/2021 1950 by Nicho Webb RN  Outcome: Ongoing  Goal: Will regain or maintain usual level of consciousness  1/14/2021 0212 by Osiris Allison RN  Outcome: Ongoing  1/13/2021 1950 by Nicho Webb RN  Outcome: Ongoing  Goal: Complications related to the disease process, condition or treatment will be avoided or minimized  1/14/2021 0212 by Osiris Allison RN  Outcome: Ongoing  1/13/2021 1950 by Nicho Webb RN  Outcome: Ongoing     Problem: Isolation Precautions - Risk of Spread of Infection  Goal: Prevent transmission of infection  1/14/2021 0212 by Sami Flores RN  Outcome: Ongoing  1/13/2021 1950 by Giovana Webster RN  Outcome: Ongoing     Problem: Nutrition Deficits  Goal: Optimize nutrtional status  1/14/2021 0212 by Sami Flores RN  Outcome: Ongoing  1/13/2021 1950 by Giovana Webster RN  Outcome: Ongoing     Problem: Risk for Fluid Volume Deficit  Goal: Maintain normal heart rhythm  1/14/2021 0212 by Sami Flores RN  Outcome: Ongoing  1/13/2021 1950 by Giovana Webster RN  Outcome: Ongoing  Goal: Maintain absence of muscle cramping  1/14/2021 0212 by Sami Flores RN  Outcome: Ongoing  1/13/2021 1950 by Giovana Webster RN  Outcome: Ongoing  Goal: Maintain normal serum potassium, sodium, calcium, phosphorus, and pH  1/14/2021 0212 by Sami Flores RN  Outcome: Ongoing  1/13/2021 1950 by Giovana Webster RN  Outcome: Ongoing     Problem: Loneliness or Risk for Loneliness  Goal: Demonstrate positive use of time alone when socialization is not possible  1/14/2021 0212 by Sami Flores RN  Outcome: Ongoing  1/13/2021 1950 by Giovana Webster RN  Outcome: Ongoing     Problem: Fatigue  Goal: Verbalize increase energy and improved vitality  1/14/2021 0212 by Sami Flores RN  Outcome: Ongoing  1/13/2021 1950 by Giovana Webster RN  Outcome: Ongoing     Problem: Patient Education: Go to Patient Education Activity  Goal: Patient/Family Education  1/14/2021 1496 by Sami Flores RN  Outcome: Ongoing  1/13/2021 1950 by Giovana Webster RN  Outcome: Ongoing     Problem: Falls - Risk of:  Goal: Will remain free from falls  Description: Will remain free from falls  1/14/2021 0212 by Sami Flores RN  Outcome: Ongoing  1/13/2021 1950 by Giovana Webster RN  Outcome: Ongoing  Goal: Absence of physical injury  Description: Absence of physical injury  1/14/2021 0212 by Sami Flores RN  Outcome: Ongoing  1/13/2021 1950 by Giovana Webster RN  Outcome: Ongoing     Problem: Nutrition  Goal: Optimal nutrition therapy  1/14/2021 0212 by Jessica Lara RN  Outcome: Ongoing  1/13/2021 1950 by Massimo Sol RN  Outcome: Ongoing     Problem: Skin Integrity:  Goal: Will show no infection signs and symptoms  Description: Will show no infection signs and symptoms  1/14/2021 0212 by Jessica Lara RN  Outcome: Ongoing  1/13/2021 1950 by Massimo Sol RN  Outcome: Ongoing  Goal: Absence of new skin breakdown  Description: Absence of new skin breakdown  1/14/2021 0212 by Jessica Lara RN  Outcome: Ongoing  1/13/2021 1950 by Massimo Sol RN  Outcome: Ongoing

## 2021-01-14 NOTE — SIGNIFICANT EVENT
Rapid response: Called to bedside by nursing, patient was noted to be very significantly short of breath but also noted to be very notably weak and her blood pressures were noted to be down into the low 80s to 70s on automatic cuff. I examined the patient at bedside, she definitely looked very pale and weak but still responsive and able to answer questions appropriately. Did not look expressively short of breath but was on high flow nasal cannula oxygen. Nursing had obtained a manual blood pressure after repositioning patient in placing her in the bed and putting her in a Trendelenburg position. Blood pressures were still noted to be in the high 14L systolic. Ordered nursing to go ahead and give a 500 cc bolus of IV fluids in addition to the IV fluid maintenance that she was getting. Ordered a stat chest x-ray that looked on appearance much more improved compared to previous x-ray from a few days ago. Stat labs were obtained including CBC, CMP, lactic acid, troponin and a type and screen. Very concerning finding of hemoglobin of 6.5 which dropped from 10 about a day or so ago, lactic acid was also noted to be 3.7, troponin was negative and CMP was overall unremarkable except for a BUN of 44 and a noted calcium that is possibly corrected to be still in the low range. Called back to nursing and advised for patient to be ordered a stat packed red blood cell unit and to transfuse that over 2 hours and a pressure bag form. Nursing will continue to keep monitoring patient's blood pressure. Uncertain at this time as to patient's noted new worsened anemia, currently no reported bleeding per nursing nor from patient and there is no trauma noted upon the patient's body. Physical exam was overall unremarkable even on auscultation of the lungs. Also gave patient 1 dose of 10 mg of midodrine to help augment blood pressures, may consider further midodrine later.   Patient will likely also need more blood units and for

## 2021-01-14 NOTE — PROGRESS NOTES
Updated Sister in law Stevie Bernheim on patient changes and plan of care after rapid response this morning. She will update patient's sons. Day shift RN aware.

## 2021-01-14 NOTE — PROGRESS NOTES
Upon arrival to place PICC line assessed chart for issues related to picc placement, check for consent, and did time out with DUY Valenzuela. Pt. Tolerated PICC placement well, no difficulty accessing left basilic vein and 3CG technology used to verify PICC tip placement. Positive P wave with no negative deflection. Printed wave form and placed In chart. Reported off to DUY Valenzuela ok to use line.

## 2021-01-14 NOTE — PROGRESS NOTES
HR is staying in the 120s-130s and Pt is sitting in the chair. Christoph Urena MD notifed. EKG ordered. Will continue to monitor.     Electronically signed by Kina Lindquist RN on 1/13/2021 at 7:48 PM

## 2021-01-14 NOTE — PROGRESS NOTES
Occupational Therapy  Unable to see pt at this time due to pt currently waiting to receive blood. RN requesting to hold at this time. Will follow up with pt as able. Continue with POC.     UBALDO Concepcion/L

## 2021-01-14 NOTE — PROGRESS NOTES
Infectious Disease Follow up Notes  Admit Date: 12/28/2020  Hospital Day: 18    Antibiotics :   IV Cefepime     CHIEF COMPLAINT:     COVID 19 PNA  Hypoxic resp failure  WBC elevation  Sepsis  UTI  Bacteremia  GI bleed       Subjective interval History :  63 y.o. woman with a past history of breast cancer, depression, hypertension, obesity, scoliosis, thyroid disease surgical history include hysterectomy breast biopsy back surgery admitted to [facility] [place] on 12/29/2020 with cough shortness of breath diagnosed with COVID-19 pneumonia. She was in acute hypoxic respiratory failure requiring high heated flow oxygenation. She completed a course of IV remdesivir dexamethasone and also received convalescent plasma during this hospitalization. She now developed a WBC elevation with elevated procalcitonin, hence blood culture and urine culture were obtained. Blood cultures from 1/10/2021 no isolated Klebsiella pneumonia as well as urine culture positive for Klebsiella pneumonia. She was started on IV antibiotics given the ongoing sepsis with WBC elevation we are consulted for recommendations. She is still currently receiving 15 L of heated high flow nasal cannula for COVID-19 pneumonia. CT chest from 1/10/2021 still indicates ongoing extensive groundglass opacities in both lung consistent with COVID-19 infection.     Interval History : Remains short of breath using 15 L/min heated high flow nasal cannula. She did drop her hemoglobin there is a concern for GI bleed, lactic acid was elevated.   Tolerating antibiotic therapy okay  Past Medical History:    Past Medical History:   Diagnosis Date    Arthritis     Breast cancer ([address]     Depression     Hypertension     Obesity     Peptic ulcer disease     Scoliosis     Skin cancer     basal cell    Thyroid disease        Past Surgical History:    Past Surgical History:   Procedure Used   Substance Use Topics    Alcohol use: Not Currently    Drug use: Never     Social History     Tobacco Use   Smoking Status Never Smoker   Smokeless Tobacco Never Used      Family History : no DVT no COPD       REVIEW OF SYSTEMS:     Constitutional:  fevers + , chills, night sweats  Eyes:  negative for blurred vision, eye discharge, visual disturbance   HEENT:  negative for hearing loss, ear drainage,nasal congestion  Respiratory:  cough+ , shortness of breath +  or hemoptysis   Cardiovascular:  negative for chest pain, palpitations, syncope  Gastrointestinal:  negative for nausea, vomiting, diarrhea, constipation, abdominal pain +   Genitourinary:  negative for frequency, dysuria, urinary incontinence, hematuria  Hematologic/Lymphatic:  negative for easy bruising, bleeding and lymphadenopathy  Allergic/Immunologic:  negative for recurrent infections, angioedema, anaphylaxis   Endocrine:  negative for weight changes, polyuria, polydipsia and polyphagia  Musculoskeletal:  negative for joint  pain, swelling, decreased range of motion  Integumentary: No rashes, skin lesions  Neurological:  negative for headaches, slurred speech, unilateral weakness  Psychiatric: negative for hallucinations,confusion,agitation.                 PHYSICAL EXAM:      Vitals:    /69   Pulse 115   Temp 98.2 °F (36.8 °C) (Axillary)   Resp 30   Ht 5' 6\" (1.676 m)   Wt 183 lb 3.2 oz (83.1 kg)   SpO2 96%   BMI 29.57 kg/m²     PHYSICAL EXAM:     General Appearance: alert,in  acute distress, +  pallor, no icterus remains on high flow nasal cannula  Skin: warm and dry, no rash or erythema  Head: normocephalic and atraumatic  Eyes: pupils equal, round, and reactive to light, conjunctivae normal  ENT: tympanic membrane, external ear and ear canal normal bilaterally, nose without deformity, nasal mucosa and turbinates normal without polyps  Neck: supple and non-tender without mass, no thyromegaly  no cervical lymphadenopathy  Pulmonary/Chest: Bi basal crepts  wheezes, rales or rhonchi, normal air movement, no respiratory distress  Cardiovascular: normal rate, regular rhythm, normal S1 and S2, no murmurs, rubs, clicks, or gallops, no carotid bruits  Abdomen: soft, non-tender, non-distended, normal bowel sounds, no masses or organomegaly  Extremities: no cyanosis, clubbing or edema  Musculoskeletal: normal range of motion, no joint swelling, deformity or tenderness  Integumentary: No rashes, no abnormal skin lesions, no petechiae  Neurologic: reflexes normal and symmetric, no cranial nerve deficit  Psych:  Orientation, sensorium, mood normal            Lines:IV          Data Review:    CBC:   Lab Results   Component Value Date    WBC 14.0 (H) 01/14/2021    HGB 6.5 (LL) 01/14/2021    HCT 20.3 (LL) 01/14/2021    MCV 94.7 01/14/2021     01/14/2021     RENAL:   Lab Results   Component Value Date    CREATININE 0.9 01/14/2021    BUN 44 (H) 01/14/2021     01/14/2021    K 3.8 01/14/2021     (H) 01/14/2021    CO2 22 01/14/2021     SED RATE: No results found for: SEDRATE  CK: No results found for: CKTOTAL  CRP:   Lab Results   Component Value Date    CRP 36.6 01/14/2021     Hepatic Function Panel:   Lab Results   Component Value Date    ALKPHOS 56 01/14/2021    ALT 16 01/14/2021    AST 20 01/14/2021    PROT 4.0 01/14/2021    BILITOT <0.2 01/14/2021    LABALBU 1.6 01/14/2021     UA:  Lab Results   Component Value Date    COLORU DK YELLOW 01/10/2021    CLARITYU TURBID 01/10/2021    GLUCOSEU Negative 01/10/2021    BILIRUBINUR SMALL 01/10/2021    KETUA Negative 01/10/2021    SPECGRAV 1.021 01/10/2021    BLOODU LARGE 01/10/2021    PHUR 5.5 01/10/2021    PROTEINU 100 01/10/2021    UROBILINOGEN 0.2 01/10/2021    NITRU Negative 01/10/2021    LEUKOCYTESUR MODERATE 01/10/2021    LABMICR YES 01/10/2021    URINETYPE NotGiven 01/10/2021      Urine Microscopic:   Lab Results   Component Value Date    BACTERIA 4+ 01/10/2021 COMU see below 01/10/2021    WBCUA 153 01/10/2021    RBCUA 34 01/10/2021    EPIU 1 01/10/2021     Urine Reflex to Culture: No results found for: URRFLXCULT    Procal  3.81     CRP  36.6     laCTIC acid  3.7     MICRO: cultures reviewed and updated by me   Blood Culture:          Culture, Urine [[id number]] (Abnormal)  Collected: 01/10/21 1130   Order Status: Completed Specimen: Urine, clean catch Updated: 01/12/21 0758    Organism Klebsiella pneumoniaeAbnormal     Urine Culture, Routine >100,000 CFU/ml   Narrative:     ORDER#: [id number]                          ORDERED BY: Valentino Dubois   SOURCE: Urine Clean Catch                  COLLECTED:  01/10/21 11:30   ANTIBIOTICS AT [facility] :                      RECEIVED :  01/10/21 11:49   Performed at:   [facility]   [address] [place], [place] [address]   Phone [phone]   Culture, Blood 2 [[id number]] (Abnormal) Collected: 01/10/21 1541   Order Status: Completed Specimen: Blood Updated: 01/12/21 0725    Culture, Blood 2 --Abnormal     Gram stain Aerobic bottle:   Gram negative rods   Information to follow   Gram stain Anaerobic bottle:   Gram negative rods   Information to follow   Abnormal     Organism Klebsiella pneumoniaeAbnormal     Culture, Blood 2 --    POSITIVE for   No further workup   Refer to culture [id number] for sensitivity results    Isolated two of two sets    Narrative:     ORDER#: [id number]                          ORDERED BY: Valentino Dubois   SOURCE: Blood                              COLLECTED:  01/10/21 15:41   ANTIBIOTICS AT [facility] :                      RECEIVED :  01/10/21 15:41   CALL  [facility]dom  [id number] tel. [phone],   Previous panic on this admission - call not needed per SOP, 01/11/2021 07:03,   by Sameera Valverde   If child <=2 yrs old please draw pediatric bottle. ~Blood Culture #2   Performed at:   [facility]   [address] [place], [place] [address]   Phone [phone] [address] [place], [place] [address]   Phone [phone]   Strep Pneumoniae Antigen [[id number]] Collected: 01/10/21 1130   Order Status: Completed Specimen: Urine, clean catch Updated: 01/11/21 1347    STREP PNEUMONIAE ANTIGEN, URINE --    Presumptive Negative   Presumptive negative suggests no current or recent   pneumococcal infection. Infection due to Strep pneumoniae   cannot be ruled out since the antigen present in the sample   may be below the detection limit of the test.   Normal Range:Presumptive Negative    Narrative:     ORDER#: [id number]                          ORDERED BY: Arnol Ro   SOURCE: Urine Clean Catch                  COLLECTED:  01/10/21 11:30   ANTIBIOTICS AT [facility] :                      RECEIVED :  01/10/21 11:49   Performed at:   [address] [email or web address] [address]   Phone [phone]   Legionella Antigen, Urine [[id number]] Collected: 01/10/21 1130   Order Status: Completed Specimen: Urine, clean catch Updated: 01/11/21 1346    L. pneumophila Serogp 1 Ur Ag --    Presumptive Negative   No Legionella pneumophila serogroup 1 antigens detected. A negative result does not exclude infection with   Legionella pneumophila serogroup 1 nor does it rule out   other microbial-caused respiratory infections or   disease caused by other serogroups of   Legionella pneumophila. Normal Range: Presumptive Negative    Narrative:     ORDER#: [id number]                          ORDERED BY: Arnol Ro   SOURCE: Urine Clean Catch                  COLLECTED:  01/10/21 11:30   ANTIBIOTICS AT [facility] :                      RECEIVED :  01/10/21 11:49   Performed at:   [address], [place] [address]   Phone [phone]   Culture, Blood, PCR ID Panel Results Report [[id number]] Collected: 01/10/21 1310   Order Status: Completed Updated: 01/11/21 0709    Report SEE IMAGE   Narrative:     Shania De La Torre  [id number] Geryl Olszewski [id number], [id number]   Microbiology results called to and read back by pharmacist Ross Nelson,   01/11/2021 07:07, by [facility]   Microbiology results called to and read back by DUY Lombardo, 01/11/2021   07:07, by [facility]   Referred out by:   [facility]   [address], [employer] [facility] [address]   Phone [phone]   Culture, Respiratory [[id number]]    Order Status: No result Specimen: Sputum Expectorated    Culture, Blood 1 [[id number]] Collected: 12/28/20 2140   Order Status: Completed Specimen: Blood Updated: 01/02/21 1415    Blood Culture, Routine No Growth after 4 days of incubation. Narrative:     ORDER#: [id number]                          ORDERED BY: Suzan Brandon   SOURCE: Blood Antecubital-Lef              COLLECTED:  12/28/20 21:40   ANTIBIOTICS [facility] :                      RECEIVED :  12/29/20 13:55   If child <=2 yrs old please draw pediatric bottle. ~Blood Culture #1   Performed at:   [facility]   [address], [employer] [facility] [employer] [address]   Phone [phone]   Culture, Blood 2 [[id number]] Collected: 12/28/20 2215   Order Status: Completed Specimen: Blood Updated: 01/02/21 1415    Culture, Blood 2 No Growth after 4 days of incubation. Narrative:     ORDER#: [id number]                          ORDERED BY: Suzan Brandon   SOURCE: Blood Antecubital-Rig              COLLECTED:  12/28/20 22:15   ANTIBIOTICS AT [facility] :                      RECEIVED :  12/29/20 13:55   If child <=2 yrs old please draw pediatric bottle. ~Blood Culture #2   Performed at:   [place] [address] [facility], [address] [facility], [employer] [address]   Phone [phone]     Klebsiella pneumoniae (1)    Antibiotic Interpretation MARIA ELENA Status    ampicillin Resistant >=32 mcg/mL     ceFAZolin Sensitive <=4 mcg/mL      NOTE: Cefazolin should only be used for uncomplicated UTI         for E.coli or Klebsiella pneumoniae.    cefepime Sensitive <=0.12 mcg/mL     cefTRIAXone Sensitive <=0.25 mcg/mL     ciprofloxacin Sensitive <=0.25 mcg/mL     ertapenem Sensitive <=0.12 mcg/mL     gentamicin Sensitive <=1 mcg/mL     levofloxacin Sensitive <=0.12 mcg/mL     nitrofurantoin Sensitive <=16 mcg/mL     piperacillin-tazobactam Sensitive <=4 mcg/mL     trimethoprim-sulfamethoxazole Sensitive <=20 mcg/mL         Lab Results   Component Value Date    [facility]  01/13/2021     No Growth to date. Any change in status will be called. BLOODCULT2  01/13/2021     No Growth to date. Any change in status will be called. Respiratory Culture:  No results found for: Clemon Wilfredo  AFB:No results found for: AFBSMEAR  Viral Culture:  Lab Results   Component Value Date    COVID19 DETECTED 12/28/2020     Urine Culture:   No results for input(s): Tu Pereira in the last 72 hours. IMAGING:    XR CHEST PORTABLE   Preliminary Result   Slight interval improvement in appearance of diffuse multifocal airspace   opacities, likely a combination of multifocal pneumonia and superimposed   pulmonary edema. XR CHEST PORTABLE   Final Result   Stable diffuse bilateral lung disease compatible with pneumonia and/or edema         CT CHEST PULMONARY EMBOLISM W CONTRAST   Final Result   No evidence of pulmonary embolism. Mildly dilated and atherosclerotic thoracic aorta with no aneurysm or   dissection. Extensive ground-glass opacities throughout both lungs some and subsegmental   bibasilar opacities which could represent extensive multisegmental   bronchopneumonia and/or pulmonary edema vs pulmonary hemorrhage. Small left pleural effusion. Small lymph nodes scattered in the mediastinum which are not pathologic by   size criteria. Moderate degenerative changes and postop changes in the spine      9 mm hypodensity left lobe liver which could represent a cyst but is   ill-defined. Suggest ultrasound correlation.          XR CHEST PORTABLE   Final Result Bilateral ill-defined airspace opacities could represent atypical pneumonia,   multifocal bacterial pneumonia or subsegmental atelectasis               All the pertinent images and reports for the current Hospitalization were reviewed by me     Scheduled Meds:   pantoprazole  80 mg Intravenous Once    And    sodium chloride (PF)  10 mL Intravenous Daily    predniSONE  60 mg Oral Daily    fluconazole  100 mg Oral Daily    lidocaine 1 % injection  5 mL Intradermal Once    sodium chloride flush  10 mL Intravenous 2 times per day    cefepime  2 g Intravenous Q12H    senna  1 tablet Oral Nightly    polyethylene glycol  17 g Oral Daily    budesonide-formoterol  2 puff Inhalation BID    [Held by provider] enoxaparin  40 mg Subcutaneous BID    [Held by provider] amLODIPine  5 mg Oral Daily    [Held by provider] lisinopril  30 mg Oral Daily    [Held by provider] metoprolol tartrate  100 mg Oral BID    sodium chloride flush  10 mL Intravenous 2 times per day    [Held by provider] aspirin  81 mg Oral Daily    levothyroxine  75 mcg Oral Daily    atorvastatin  40 mg Oral Daily    insulin lispro  0-6 Units Subcutaneous TID WC    insulin lispro  0-3 Units Subcutaneous Nightly    anastrozole  1 mg Oral Daily    DULoxetine  60 mg Oral Daily       Continuous Infusions:   sodium chloride      pantoprozole (PROTONIX) infusion      sodium chloride 100 mL/hr at 01/14/21 0630    sodium chloride      dextrose         PRN Meds:  sodium chloride, sodium chloride flush, LORazepam, albuterol sulfate HFA **AND** ipratropium **AND** MDI Treatment, sodium chloride, sodium chloride flush, acetaminophen **OR** acetaminophen, glucose, dextrose, glucagon (rDNA), dextrose, ondansetron, guaiFENesin-dextromethorphan, sodium chloride      Assessment:     Patient Active Problem List   Diagnosis    Malignant neoplasm of central portion of right female breast ([facility] [address]    Encounter for consultation    Encounter for antineoplastic radiation therapy    ER+ (estrogen receptor positive status)    HER2-negative carcinoma of breast (HCC)    Use of anastrozole (Arimidex)    Visit for monitoring Arimidex therapy    Osteopenia    Pneumonia due to organism    Suspected COVID-19 virus infection    Hypothyroidism    HLD (hyperlipidemia)    PCOS (polycystic ovarian syndrome)    Acute respiratory failure with hypoxia (HCC)    2019 novel coronavirus-infected pneumonia (NCIP)    Elevated LDH    Elevated AST (SGOT)    Elevated ferritin    Elevated d-dimer    History of depression    Essential hypertension    Class 2 obesity due to excess calories with body mass index (BMI) of 35.0 to 35.9 in adult    Pneumonia due to COVID-19 virus    Hyperglycemia    Leukocytosis       Sepsis  Klebsiella pneumonia urinary tract infection  Urinalysis very abnormal from 1/10/2021  Klebsiella pneumoniae bacteremia  WBC elevation  COVID-19 pneumonia  Acute hypoxic respiratory failure  CT chest with bilateral multifocal pneumonia  Elevated D-dimer elevated ferritin  History of breast cancer  History of diabetes  BMI 29.36  Elevated procalcitonin  Lactic acidosis   Elevated CRP   Gi bleed      She continues to require high flow oxygen supplementation for acute hypoxic respiratory failure from COVID-19 pneumonia. Unfortunately she now developed sepsis secondary to urinary tract infection with Klebsiella in the urine as well as in the bloodstream.  Will await sensitivities on the culture to adjust antibiotic therapy. Agree with IV antibiotics at this time. She completed a course of remdesivir dexamethasone and plasma for her COVID-19 pneumonia. Unfortunately  CT scan of the chest with  still ongoing changes secondary to COVID-19. Procalcitonin slowly trending down CRP improving on antibiotic therapy. Follow blood cultures in process. Unfortunately she did have a drop in hemoglobin with suspected GI bleed.   She is requiring blood transfusion also lactic acid was elevated. GI has been consulted. Anticoagulation currently on hold    Labs, Microbiology, Radiology and all the pertinent results from current hospitalization and  care every where were reviewed  by me as a part of the evaluation   Plan:   1. Cont IV Cefepime x 2 gm q 12 hrs will cover Bacteremia, UTI and PNA   2. Klebsiella sensitivities noted   3. Repeat Blood cx  on 1/13   4. Cont supportive care  5. She completed the Treatment course for COVID and now on supportive care with Oxygen supplementation  6. CRP, D dimer elevation now likely from sepsis    7. On steroids per Pulmonary  8. Has oral thrush - Fluconaozle oral   9. GI bleed s/p PRBC and anticoagulation on hold       Discussed with patient/Family and Nursing staff   Risk of Complications/Morbidity: High      · Illness(es)/ Infection present that pose threat to bodily function. · There is potential for severe exacerbation of infection/side effects of treatment. · Therapy requires intensive monitoring for antimicrobial agent toxicity. Thanks for allowing me to participate in your patient's care and please call me with any questions or concerns.     Mauricio Delaney MD  Infectious Disease  [facility]) Physician  Phone: [phone]   Fax : [phone]

## 2021-01-14 NOTE — PROGRESS NOTES
Hospitalist Progress Note      PCP: Devika Martinez MD    Date of Admission: 12/28/2020    Subjective: O2 requirement improved    Medications:  Reviewed    Infusion Medications    sodium chloride 100 mL/hr at 01/13/21 1344    sodium chloride      dextrose       Scheduled Medications    predniSONE  60 mg Oral Daily    fluconazole  100 mg Oral Daily    lidocaine 1 % injection  5 mL Intradermal Once    sodium chloride flush  10 mL Intravenous 2 times per day    cefepime  2 g Intravenous Q12H    senna  1 tablet Oral Nightly    polyethylene glycol  17 g Oral Daily    budesonide-formoterol  2 puff Inhalation BID    enoxaparin  40 mg Subcutaneous BID    [Held by provider] amLODIPine  5 mg Oral Daily    [Held by provider] lisinopril  30 mg Oral Daily    [Held by provider] metoprolol tartrate  100 mg Oral BID    sodium chloride flush  10 mL Intravenous 2 times per day    aspirin  81 mg Oral Daily    levothyroxine  75 mcg Oral Daily    atorvastatin  40 mg Oral Daily    insulin lispro  0-6 Units Subcutaneous TID WC    insulin lispro  0-3 Units Subcutaneous Nightly    anastrozole  1 mg Oral Daily    DULoxetine  60 mg Oral Daily     PRN Meds: sodium chloride flush, LORazepam, albuterol sulfate HFA **AND** ipratropium **AND** MDI Treatment, sodium chloride, sodium chloride flush, acetaminophen **OR** acetaminophen, glucose, dextrose, glucagon (rDNA), dextrose, ondansetron, guaiFENesin-dextromethorphan, sodium chloride      Intake/Output Summary (Last 24 hours) at 1/14/2021 0059  Last data filed at 1/13/2021 1110  Gross per 24 hour   Intake 1195 ml   Output 300 ml   Net 895 ml       Physical Exam Performed:    /63   Pulse 109   Temp 98.4 °F (36.9 °C) (Oral)   Resp 24   Ht 5' 6\" (1.676 m)   Wt 183 lb 4.8 oz (83.1 kg)   SpO2 93%   BMI 29.59 kg/m²       General appearance: No apparent distress, alert and cooperative.   HEENT: Conjunctivae/corneas clear, neck supple w/ full ROM  Respiratory:  Normal respiratory effort. Faint bilateral rales  Cardiovascular: Regular rate and rhythm, normal S1/S2, no murmurs  Abdomen: Soft, non-tender, non-distended with normal bowel sounds. Musculoskeletal: No edema bilaterally  Neurologic:  No new focal deficits  Psychiatric: Alert and oriented, normal insight  Capillary Refill: Brisk,< 3 seconds   Peripheral Pulses: +2 palpable, equal bilaterally      Labs:   Recent Labs     01/11/21  0611 01/12/21  0748 01/13/21  0622   WBC 17.8* 12.3* 9.3   HGB 11.8* 9.6* 10.2*   HCT 36.3 30.1* 31.7*    188 220     Recent Labs     01/11/21  0611 01/12/21  0748 01/13/21  0622    142 142   K 4.0 3.5 3.9    111* 109   CO2 27 22 23   BUN 32* 23* 27*   CREATININE 1.0 0.8 0.7   CALCIUM 8.9 8.1* 8.1*     Recent Labs     01/11/21  0611 01/12/21  0748 01/13/21  0622   AST 18 19 16   ALT 15 15 13   BILITOT <0.2 0.3 0.3   ALKPHOS 96 95 80     Recent Labs     01/11/21 0611   INR 1.14     No results for input(s): Dayron Jones in the last 72 hours. Urinalysis:      Lab Results   Component Value Date    NITRU Negative 01/10/2021    WBCUA 153 01/10/2021    BACTERIA 4+ 01/10/2021    RBCUA 34 01/10/2021    BLOODU LARGE 01/10/2021    SPECGRAV 1.021 01/10/2021    GLUCOSEU Negative 01/10/2021       Radiology:  XR CHEST PORTABLE   Final Result   Stable diffuse bilateral lung disease compatible with pneumonia and/or edema         CT CHEST PULMONARY EMBOLISM W CONTRAST   Final Result   No evidence of pulmonary embolism. Mildly dilated and atherosclerotic thoracic aorta with no aneurysm or   dissection. Extensive ground-glass opacities throughout both lungs some and subsegmental   bibasilar opacities which could represent extensive multisegmental   bronchopneumonia and/or pulmonary edema vs pulmonary hemorrhage. Small left pleural effusion. Small lymph nodes scattered in the mediastinum which are not pathologic by   size criteria. Moderate degenerative changes and postop changes in the spine      9 mm hypodensity left lobe liver which could represent a cyst but is   ill-defined. Suggest ultrasound correlation.          XR CHEST PORTABLE   Final Result   Bilateral ill-defined airspace opacities could represent atypical pneumonia,   multifocal bacterial pneumonia or subsegmental atelectasis                 Assessment/Plan:    Active Hospital Problems    Diagnosis    Pneumonia due to COVID-19 virus [U07.1, J12.82]    Hyperglycemia [R73.9]    Leukocytosis [D72.829]    Acute respiratory failure with hypoxia ([address] [J96.01]    2019 novel coronavirus-infected pneumonia (NCIP) [U07.1, J12.82]    Elevated LDH [R74.02]    Elevated AST (SGOT) [R74.01]    Elevated ferritin [R79.89]    Elevated d-dimer [R79.89]    History of depression [Z86.59]    Essential hypertension [I10]    Class 2 obesity due to excess calories with body mass index (BMI) of 35.0 to 35.9 in adult [E66.09, Z68.35]    Suspected COVID-19 virus infection [Z20.822]    Hypothyroidism [E03.9]    HLD (hyperlipidemia) [E78.5]    PCOS (polycystic ovarian syndrome) [E28.2]    Pneumonia due to organism [J18.9]         COVID 19 PNA - improved  - s/p Increased to 20 mg x 5 days then 10 mg x 5 days  - completed remdesivir day 5/5  - s/p convalescent plasma 12/30/2020  - monitor inflammatory markers  - added Symbicort and continue albuterol/ipatropium  - PLEASE AVOID NARCOTICS     Sepsis 2/2 Klebsiella UTI, Klebsiella bacteremia  Meets at least 2 SIRS Criteria:  Tachypnea > 20  HR > 90  WBC > 12K  Source: urine  -lactic acid q6h  -blood cultures positive for Klebsiella   -repeat blood cultures 1/11  -IV antibiotics: Vanc and cefepime -> cefepime  -IVF: 30 cc/kg given, followed by maintenance ivf  -WBC count and procalcitonin improved  -though she has been on high dose steroids, my concern is with a secondary bacterial process  -CTA Chest with contrast showed extensive ground-glass

## 2021-01-14 NOTE — PROGRESS NOTES
Pulmonary Progress Note    Date of Admission: 12/28/2020   LOS: 17 days     Chief Complaint   Patient presents with    Shortness of Breath     c/o sore throat, cough, and right ear feels full x 2 days. Patient also c/o SOB. Patient currently eating cheeseburger. No sob noted. Patient 88% r/a        Assessment:     Acute Hypoxemic Respiratory Failure with SAO2 <90% on Room Air improving  COVID 19 PNa  klebseilla UTI  Klebsiella bacteremia    Plan:      -Wean supplemental oxygen to goal saturation of >90%  -Hypoxia continues to improve, from her oxygen coming down  -on prednisone  -symbicort    We will sign off at this time please let us know if we can be of any further assistance. 24 Hour Events/Subjective  Requirement continues to improve, down to 6 L/min. ROS:   Deferred      Intake/Output Summary (Last 24 hours) at 1/14/2021 0903  Last data filed at 1/14/2021 0311  Gross per 24 hour   Intake 1010 ml   Output 500 ml   Net 510 ml         PHYSICAL EXAM:   Blood pressure 100/67, pulse 111, temperature 96.8 °F (36 °C), temperature source Axillary, resp. rate 22, height 5' 6\" (1.676 m), weight 183 lb 3.2 oz (83.1 kg), SpO2 91 %.'  Due to the current efforts to prevent transmission of COVID-19 and also the need to preserve PPE for other caregivers, a face-to-face encounter with the patient was not performed. That being said, all relevant records and diagnostic tests were reviewed, including laboratory results and imaging. Please reference any relevant documentation elsewhere. Care will be coordinated with the primary service.       Medications:    Scheduled Meds:   predniSONE  60 mg Oral Daily    fluconazole  100 mg Oral Daily    lidocaine 1 % injection  5 mL Intradermal Once    sodium chloride flush  10 mL Intravenous 2 times per day    cefepime  2 g Intravenous Q12H    senna  1 tablet Oral Nightly    polyethylene glycol  17 g Oral Daily    budesonide-formoterol  2 puff Inhalation BID    enoxaparin  40 mg Subcutaneous BID    [Held by provider] amLODIPine  5 mg Oral Daily    [Held by provider] lisinopril  30 mg Oral Daily    [Held by provider] metoprolol tartrate  100 mg Oral BID    sodium chloride flush  10 mL Intravenous 2 times per day    aspirin  81 mg Oral Daily    levothyroxine  75 mcg Oral Daily    atorvastatin  40 mg Oral Daily    insulin lispro  0-6 Units Subcutaneous TID WC    insulin lispro  0-3 Units Subcutaneous Nightly    anastrozole  1 mg Oral Daily    DULoxetine  60 mg Oral Daily       Continuous Infusions:   sodium chloride      sodium chloride 100 mL/hr at 01/14/21 0630    sodium chloride      dextrose         PRN Meds:  sodium chloride, sodium chloride flush, LORazepam, albuterol sulfate HFA **AND** ipratropium **AND** MDI Treatment, sodium chloride, sodium chloride flush, acetaminophen **OR** acetaminophen, glucose, dextrose, glucagon (rDNA), dextrose, ondansetron, guaiFENesin-dextromethorphan, sodium chloride    Labs reviewed:  CBC:   Recent Labs     01/12/21  0748 01/13/21  0622 01/14/21  0545   WBC 12.3* 9.3 14.0*   HGB 9.6* 10.2* 6.5*   HCT 30.1* 31.7* 20.3*   MCV 93.4 93.6 94.7    220 280     BMP:   Recent Labs     01/12/21  0748 01/13/21  0622 01/14/21  0545    142 142   K 3.5 3.9 3.8   * 109 113*   CO2 22 23 22   BUN 23* 27* 44*   CREATININE 0.8 0.7 0.9     LIVER PROFILE:   Recent Labs     01/12/21  0748 01/13/21  0622 01/14/21  0545   AST 19 16 20   ALT 15 13 16   BILITOT 0.3 0.3 <0.2   ALKPHOS 95 80 56     PT/INR:   No results for input(s): PROTIME, INR in the last 72 hours. APTT:   No results for input(s): APTT in the last 72 hours. UA:  No results for input(s): NITRITE, COLORU, PHUR, LABCAST, WBCUA, RBCUA, MUCUS, TRICHOMONAS, YEAST, BACTERIA, CLARITYU, SPECGRAV, LEUKOCYTESUR, UROBILINOGEN, BILIRUBINUR, BLOODU, GLUCOSEU, AMORPHOUS in the last 72 hours.     Invalid input(s): Arthor Nails  No results for input(s): PH, PCO2, PO2 in the last 72 hours.      Films:  Radiology Review:  Pertinent images / reports were reviewed as a part of this visit. XR CHEST PORTABLE  Narrative: EXAMINATION:  ONE XRAY VIEW OF THE CHEST    1/14/2021 5:27 am    COMPARISON:  01/12/2021, 12/28/2020    HISTORY:  ORDERING SYSTEM PROVIDED HISTORY: shortness of breath  TECHNOLOGIST PROVIDED HISTORY:  Reason for exam:->shortness of breath    FINDINGS:  A single frontal view of the chest demonstrates no acute skeletal  abnormality. There is a stable jamie stabilizing the thoracolumbar spine with  a chronic dextroscoliosis noted. There are clips overlying the right  axillary soft tissues. The heart size and mediastinal contours are stable  with stable prominence of the aortic knob. There has been slight interval  improvement in appearance of diffuse airspace opacity throughout both lungs. There is no evidence of a pneumothorax. Impression: Slight interval improvement in appearance of diffuse multifocal airspace  opacities, likely a combination of multifocal pneumonia and superimposed  pulmonary edema. This note was transcribed using [id number] [employer]Decide. Please disregard any translational errors.     Thank you for this consult,    Edna Rand [address] [facility]

## 2021-01-14 NOTE — PROGRESS NOTES
evidenced by NPO or clear liquid status due to medical condition      Nutrition Interventions:   Food and/or Nutrient Delivery:  (monitor for restart of nutrition)  Nutrition Education/Counseling:  Education not indicated   Coordination of Nutrition Care:  Continue to monitor while inpatient    Goals:  Meal and supplement intake greater than 50%       Nutrition Monitoring and Evaluation:   Behavioral-Environmental Outcomes:  None Identified   Food/Nutrient Intake Outcomes:  Diet Advancement/Tolerance  Physical Signs/Symptoms Outcomes:  Biochemical Data, GI Status, Constipation, Weight, Skin     Discharge Planning:     Too soon to determine     Electronically signed by Penelope Nazario RD, LD on 1/14/21 at 1:20 PM EST    Contact: [phone]

## 2021-01-14 NOTE — PROGRESS NOTES
Physical Therapy  Attempt Note    Name:Jeannette Aguilar  NSJ:[id number]  YRT:[id number]  Room: N2E-0517/5252-01    Date of Service: 1/14/2021   Patient chart reviewed. PT attempted to see for PT tx at this time. Pt supine in bed, per RN hold this date due to multiple episodes of emesis with blood. Will attempt again tomorrow as therapy schedule permits. If patient is discharged prior to the next physical therapy visit; Please see last written PT note for discharge status.              Electronically signed by Sarah Mosquera PT on 1/14/2021 at 2:37 PM

## 2021-01-15 LAB
A/G RATIO: 1 (ref 1.1–2.2)
ALBUMIN SERPL-MCNC: 2.2 G/DL (ref 3.4–5)
ALP BLD-CCNC: 65 U/L (ref 40–129)
ALT SERPL-CCNC: 16 U/L (ref 10–40)
ANION GAP SERPL CALCULATED.3IONS-SCNC: 7 MMOL/L (ref 3–16)
AST SERPL-CCNC: 19 U/L (ref 15–37)
BASOPHILS ABSOLUTE: 0 K/UL (ref 0–0.2)
BASOPHILS RELATIVE PERCENT: 0 %
BILIRUB SERPL-MCNC: <0.2 MG/DL (ref 0–1)
BLOOD BANK DISPENSE STATUS: NORMAL
BLOOD BANK PRODUCT CODE: NORMAL
BPU ID: NORMAL
BUN BLDV-MCNC: 31 MG/DL (ref 7–20)
CALCIUM SERPL-MCNC: 7.7 MG/DL (ref 8.3–10.6)
CHLORIDE BLD-SCNC: 112 MMOL/L (ref 99–110)
CO2: 22 MMOL/L (ref 21–32)
CREAT SERPL-MCNC: 0.7 MG/DL (ref 0.6–1.2)
DESCRIPTION BLOOD BANK: NORMAL
EOSINOPHILS ABSOLUTE: 0.4 K/UL (ref 0–0.6)
EOSINOPHILS RELATIVE PERCENT: 3 %
GFR AFRICAN AMERICAN: >60
GFR NON-AFRICAN AMERICAN: >60
GLOBULIN: 2.2 G/DL
GLUCOSE BLD-MCNC: 106 MG/DL (ref 70–99)
GLUCOSE BLD-MCNC: 111 MG/DL (ref 70–99)
GLUCOSE BLD-MCNC: 125 MG/DL (ref 70–99)
GLUCOSE BLD-MCNC: 98 MG/DL (ref 70–99)
GLUCOSE BLD-MCNC: 99 MG/DL (ref 70–99)
HCT VFR BLD CALC: 21.1 % (ref 36–48)
HCT VFR BLD CALC: 24.1 % (ref 36–48)
HCT VFR BLD CALC: 24.4 % (ref 36–48)
HEMOGLOBIN: 6.7 G/DL (ref 12–16)
HEMOGLOBIN: 7.9 G/DL (ref 12–16)
HEMOGLOBIN: 8 G/DL (ref 12–16)
LYMPHOCYTES ABSOLUTE: 0.7 K/UL (ref 1–5.1)
LYMPHOCYTES RELATIVE PERCENT: 6 %
MAGNESIUM: 1.9 MG/DL (ref 1.8–2.4)
MCH RBC QN AUTO: 30 PG (ref 26–34)
MCHC RBC AUTO-ENTMCNC: 32.4 G/DL (ref 31–36)
MCV RBC AUTO: 92.6 FL (ref 80–100)
MONOCYTES ABSOLUTE: 0.4 K/UL (ref 0–1.3)
MONOCYTES RELATIVE PERCENT: 3 %
NEUTROPHILS ABSOLUTE: 10.3 K/UL (ref 1.7–7.7)
NEUTROPHILS RELATIVE PERCENT: 88 %
PDW BLD-RTO: 14.3 % (ref 12.4–15.4)
PERFORMED ON: ABNORMAL
PERFORMED ON: ABNORMAL
PERFORMED ON: NORMAL
PERFORMED ON: NORMAL
PLATELET # BLD: 182 K/UL (ref 135–450)
PLATELET SLIDE REVIEW: ADEQUATE
PMV BLD AUTO: 9.1 FL (ref 5–10.5)
POTASSIUM REFLEX MAGNESIUM: 3.4 MMOL/L (ref 3.5–5.1)
PROCALCITONIN: 1.51 NG/ML (ref 0–0.15)
RBC # BLD: 2.64 M/UL (ref 4–5.2)
RBC # BLD: NORMAL 10*6/UL
SLIDE REVIEW: ABNORMAL
SODIUM BLD-SCNC: 141 MMOL/L (ref 136–145)
TOTAL PROTEIN: 4.4 G/DL (ref 6.4–8.2)
WBC # BLD: 11.7 K/UL (ref 4–11)

## 2021-01-15 PROCEDURE — 2580000003 HC RX 258: Performed by: NURSE PRACTITIONER

## 2021-01-15 PROCEDURE — 36592 COLLECT BLOOD FROM PICC: CPT

## 2021-01-15 PROCEDURE — 2580000003 HC RX 258: Performed by: INTERNAL MEDICINE

## 2021-01-15 PROCEDURE — 83735 ASSAY OF MAGNESIUM: CPT

## 2021-01-15 PROCEDURE — 94761 N-INVAS EAR/PLS OXIMETRY MLT: CPT

## 2021-01-15 PROCEDURE — 2700000000 HC OXYGEN THERAPY PER DAY

## 2021-01-15 PROCEDURE — C9113 INJ PANTOPRAZOLE SODIUM, VIA: HCPCS | Performed by: INTERNAL MEDICINE

## 2021-01-15 PROCEDURE — 85018 HEMOGLOBIN: CPT

## 2021-01-15 PROCEDURE — 94640 AIRWAY INHALATION TREATMENT: CPT

## 2021-01-15 PROCEDURE — 36430 TRANSFUSION BLD/BLD COMPNT: CPT

## 2021-01-15 PROCEDURE — 2060000000 HC ICU INTERMEDIATE R&B

## 2021-01-15 PROCEDURE — 99233 SBSQ HOSP IP/OBS HIGH 50: CPT | Performed by: INTERNAL MEDICINE

## 2021-01-15 PROCEDURE — P9016 RBC LEUKOCYTES REDUCED: HCPCS

## 2021-01-15 PROCEDURE — 6370000000 HC RX 637 (ALT 250 FOR IP): Performed by: INTERNAL MEDICINE

## 2021-01-15 PROCEDURE — 6360000002 HC RX W HCPCS: Performed by: STUDENT IN AN ORGANIZED HEALTH CARE EDUCATION/TRAINING PROGRAM

## 2021-01-15 PROCEDURE — 84145 PROCALCITONIN (PCT): CPT

## 2021-01-15 PROCEDURE — 6370000000 HC RX 637 (ALT 250 FOR IP): Performed by: STUDENT IN AN ORGANIZED HEALTH CARE EDUCATION/TRAINING PROGRAM

## 2021-01-15 PROCEDURE — 85014 HEMATOCRIT: CPT

## 2021-01-15 PROCEDURE — 6360000002 HC RX W HCPCS: Performed by: INTERNAL MEDICINE

## 2021-01-15 PROCEDURE — 80053 COMPREHEN METABOLIC PANEL: CPT

## 2021-01-15 PROCEDURE — 85025 COMPLETE CBC W/AUTO DIFF WBC: CPT

## 2021-01-15 RX ORDER — 0.9 % SODIUM CHLORIDE 0.9 %
250 INTRAVENOUS SOLUTION INTRAVENOUS ONCE
Status: COMPLETED | OUTPATIENT
Start: 2021-01-15 | End: 2021-01-15

## 2021-01-15 RX ADMIN — PREDNISONE 60 MG: 20 TABLET ORAL at 19:08

## 2021-01-15 RX ADMIN — FLUCONAZOLE 100 MG: 100 TABLET ORAL at 19:08

## 2021-01-15 RX ADMIN — ONDANSETRON 4 MG: 2 INJECTION INTRAMUSCULAR; INTRAVENOUS at 18:00

## 2021-01-15 RX ADMIN — CEFEPIME HYDROCHLORIDE 2 G: 2 INJECTION, POWDER, FOR SOLUTION INTRAVENOUS at 00:18

## 2021-01-15 RX ADMIN — SODIUM CHLORIDE: 9 INJECTION, SOLUTION INTRAVENOUS at 16:18

## 2021-01-15 RX ADMIN — LORAZEPAM 0.5 MG: 0.5 TABLET ORAL at 02:05

## 2021-01-15 RX ADMIN — Medication 10 ML: at 09:34

## 2021-01-15 RX ADMIN — SODIUM CHLORIDE, PRESERVATIVE FREE 10 ML: 5 INJECTION INTRAVENOUS at 09:35

## 2021-01-15 RX ADMIN — STANDARDIZED SENNA CONCENTRATE 8.6 MG: 8.6 TABLET ORAL at 21:28

## 2021-01-15 RX ADMIN — Medication 2 PUFF: at 08:29

## 2021-01-15 RX ADMIN — ACETAMINOPHEN 650 MG: 325 TABLET ORAL at 02:05

## 2021-01-15 RX ADMIN — ACETAMINOPHEN 650 MG: 325 TABLET ORAL at 06:27

## 2021-01-15 RX ADMIN — ACETAMINOPHEN 650 MG: 325 TABLET ORAL at 13:28

## 2021-01-15 RX ADMIN — ONDANSETRON 4 MG: 2 INJECTION INTRAMUSCULAR; INTRAVENOUS at 00:18

## 2021-01-15 RX ADMIN — LORAZEPAM 0.5 MG: 0.5 TABLET ORAL at 19:08

## 2021-01-15 RX ADMIN — Medication 2 PUFF: at 19:46

## 2021-01-15 RX ADMIN — CEFEPIME HYDROCHLORIDE 2 G: 2 INJECTION, POWDER, FOR SOLUTION INTRAVENOUS at 13:29

## 2021-01-15 RX ADMIN — PANTOPRAZOLE SODIUM 8 MG/HR: 40 INJECTION, POWDER, FOR SOLUTION INTRAVENOUS at 09:08

## 2021-01-15 RX ADMIN — LEVOTHYROXINE SODIUM 75 MCG: 0.07 TABLET ORAL at 06:22

## 2021-01-15 RX ADMIN — SODIUM CHLORIDE 250 ML: 9 INJECTION, SOLUTION INTRAVENOUS at 03:31

## 2021-01-15 ASSESSMENT — PAIN DESCRIPTION - PROGRESSION
CLINICAL_PROGRESSION: GRADUALLY IMPROVING
CLINICAL_PROGRESSION: NOT CHANGED

## 2021-01-15 ASSESSMENT — PAIN DESCRIPTION - DESCRIPTORS
DESCRIPTORS: ACHING
DESCRIPTORS: HEADACHE
DESCRIPTORS: ACHING

## 2021-01-15 ASSESSMENT — PAIN SCALES - GENERAL
PAINLEVEL_OUTOF10: 4
PAINLEVEL_OUTOF10: 0

## 2021-01-15 ASSESSMENT — PAIN DESCRIPTION - ONSET: ONSET: ON-GOING

## 2021-01-15 ASSESSMENT — PAIN DESCRIPTION - PAIN TYPE: TYPE: ACUTE PAIN

## 2021-01-15 ASSESSMENT — PAIN DESCRIPTION - FREQUENCY: FREQUENCY: INTERMITTENT

## 2021-01-15 ASSESSMENT — PAIN DESCRIPTION - LOCATION
LOCATION: HEAD
LOCATION: HEAD

## 2021-01-15 NOTE — PROGRESS NOTES
Infectious Disease Follow up Notes  Admit Date: 12/28/2020  Hospital Day: 19    Antibiotics :   IV Cefepime     CHIEF COMPLAINT:     COVID 19 PNA  Hypoxic resp failure  WBC elevation  Sepsis  UTI  Bacteremia  GI bleed       Subjective interval History :  63 y.o. woman with a past history of breast cancer, depression, hypertension, obesity, scoliosis, thyroid disease surgical history include hysterectomy breast biopsy back surgery admitted to [facility] [place] on 12/29/2020 with cough shortness of breath diagnosed with COVID-19 pneumonia. She was in acute hypoxic respiratory failure requiring high heated flow oxygenation. She completed a course of IV remdesivir dexamethasone and also received convalescent plasma during this hospitalization. She now developed a WBC elevation with elevated procalcitonin, hence blood culture and urine culture were obtained. Blood cultures from 1/10/2021 no isolated Klebsiella pneumonia as well as urine culture positive for Klebsiella pneumonia. She was started on IV antibiotics given the ongoing sepsis with WBC elevation we are consulted for recommendations. She is still currently receiving 15 L of heated high flow nasal cannula for COVID-19 pneumonia. CT chest from 1/10/2021 still indicates ongoing extensive groundglass opacities in both lung consistent with COVID-19 infection.     Interval History : Hemoglobin seems to be leveled off. Procalcitonin trending down. Blood pressure elevated today tolerating antibiotic therapy okay. Still remains on 15 L nasal cannula through heated high flow.   Follow-up blood cultures are clear        Past Medical History:    Past Medical History:   Diagnosis Date    Arthritis     Breast cancer ([address]     Depression     Hypertension     Obesity     Peptic ulcer disease     Scoliosis     Skin cancer     basal cell    Thyroid disease        Past Surgical History:    Past Surgical History:   Procedure Laterality Date    BACK SURGERY      BREAST BIOPSY      BREAST LUMPECTOMY      CARPAL TUNNEL RELEASE      DILATION AND CURETTAGE OF UTERUS      HYSTERECTOMY      OVARY REMOVAL      THYROID SURGERY         Current Medications:    Outpatient Medications Marked as Taking for the 12/28/20 encounter [facility] Encounter)   Medication Sig Dispense Refill    lisinopril (PRINIVIL;ZESTRIL) 30 MG tablet Take 30 mg by mouth daily      metoprolol succinate (TOPROL XL) 100 MG extended release tablet Take 100 mg by mouth daily      amLODIPine (NORVASC) 5 MG tablet Take 5 mg by mouth daily      dicyclomine (BENTYL) 20 MG tablet Take 20 mg by mouth 3 times daily as needed      tiZANidine (ZANAFLEX) 2 MG tablet Take 4 mg by mouth nightly      anastrozole (ARIMIDEX) 1 MG tablet TAKE 1 TABLET BY MOUTH DAILY 30 tablet 5    alendronate (FOSAMAX) 70 MG tablet Take 1 tablet by mouth every 7 days 4 tablet 3    Fexofenadine HCl (ALLEGRA PO) Take 1 tablet by mouth daily as needed       Calcium Carbonate-Vit D-Min (CALCIUM 1200 PO) Take by mouth Daily       Multiple Vitamins-Minerals (CENTRUM PO) Take by mouth      simvastatin (ZOCOR) 40 MG tablet Take 40 mg by mouth nightly      levothyroxine (LEVOTHROID) 75 MCG tablet Take 75 mcg by mouth Daily      DULoxetine (CYMBALTA) 60 MG extended release capsule Take 60 mg by mouth daily      metFORMIN (GLUCOPHAGE) 500 MG tablet Take 1,000 mg by mouth 2 times daily (with meals)       aspirin 81 MG tablet Take 81 mg by mouth daily      naproxen (NAPROSYN) 375 MG tablet Take 375 mg by mouth as needed       clobetasol (TEMOVATE) 0.05 % ointment Apply topically once a week Apply topically 2 times daily.          Allergies:  Mercury    Immunizations :   Immunization History   Administered Date(s) Administered    Pneumococcal Conjugate 13-valent Miguel Zuleta) 11/23/2015       Social History:    Social History     Tobacco Use    Smoking 2020 in setting of COVID 19 outbreak and in order to minimize exposures in accordance with the flexibilities announced by CMS on March 30, 2020). In person physical examination was not conducted at bedside in this patient in COVID-19 isolation room to avoid unnecessary exposures, as it will not change my management plan for this patient. References: https://[facility]. [facility]/Portals/0/Resources/COVID-19/3_18%20Telemed%20Guidance%20Updated%20March%2018. [email or web address]                      https://[facility]. [facility]/Portals/0/Resources/COVID-19/3_18%20Telemed%20Guidance%20Updated%20March%2018. pdf?[email or web address]                      [email or web address] pdf                              Pulmonary: deferred  Abdomen/GI: deferred  Neuro: deferred  Skin: deferred  Musculoskeletal:  deferred  Genitourinary: Deferred  Psych: deferred  Lymphatic/Immunologic: deferred          Data Review:    CBC:   Lab Results   Component Value Date    WBC 11.7 (H) 01/15/2021    HGB 7.9 (L) 01/15/2021    HCT 24.4 (L) 01/15/2021    MCV 92.6 01/15/2021     01/15/2021     RENAL:   Lab Results   Component Value Date    CREATININE 0.7 01/15/2021    BUN 31 (H) 01/15/2021     01/15/2021    K 3.4 (L) 01/15/2021     (H) 01/15/2021    CO2 22 01/15/2021     SED RATE: No results found for: SEDRATE  CK: No results found for: CKTOTAL  CRP:   Lab Results   Component Value Date    CRP 36.6 01/14/2021     Hepatic Function Panel:   Lab Results   Component Value Date    ALKPHOS 65 01/15/2021    ALT 16 01/15/2021    AST 19 01/15/2021    PROT 4.4 01/15/2021    BILITOT <0.2 01/15/2021    LABALBU 2.2 01/15/2021     UA:  Lab Results   Component Value Date    COLORU DK YELLOW 01/10/2021    CLARITYU TURBID 01/10/2021    GLUCOSEU Negative 01/10/2021    BILIRUBINUR SMALL 01/10/2021    KETUA Negative 01/10/2021    SPECGRAV 1.021 01/10/2021    BLOODU LARGE 01/10/2021    PHUR 5.5 01/10/2021    PROTEINU 100 01/10/2021    UROBILINOGEN 0.2 01/10/2021    NITRU Negative 01/10/2021    LEUKOCYTESUR MODERATE 01/10/2021    LABMICR YES 01/10/2021    URINETYPE NotGiven 01/10/2021      Urine Microscopic:   Lab Results   Component Value Date    BACTERIA 4+ 01/10/2021    COMU see below 01/10/2021    WBCUA 153 01/10/2021    RBCUA 34 01/10/2021    EPIU 1 01/10/2021     Urine Reflex to Culture: No results found for: URRFLXCULT    Procal  3.81     CRP  36.6     laCTIC acid  3.7     MICRO: cultures reviewed and updated by me   Blood Culture:          Culture, Urine [[id number]] (Abnormal)  Collected: 01/10/21 1130   Order Status: Completed Specimen: Urine, clean catch Updated: 01/12/21 0758    Organism Klebsiella pneumoniaeAbnormal     Urine Culture, Routine >100,000 CFU/ml   Narrative:     ORDER#: [id number]                          ORDERED BY: Betsey Jorge   SOURCE: Urine Clean Catch                  COLLECTED:  01/10/21 11:30   ANTIBIOTICS AT [facility]. :                      RECEIVED :  01/10/21 11:49   Performed at:   [place] [address]   Phone [phone]   Culture, Blood 2 [[id number]] (Abnormal) Collected: 01/10/21 1546   Order Status: Completed Specimen: Blood Updated: 01/12/21 0732    Culture, Blood 2 --Abnormal     Gram stain Aerobic bottle:   Gram negative rods   Information to follow   Gram stain Anaerobic bottle:   Gram negative rods   Information to follow   Abnormal     Organism Klebsiella pneumoniaeAbnormal     Culture, Blood 2 --    POSITIVE for   No further workup   Refer to culture [id number] for sensitivity results    Isolated two of two sets    Narrative:     ORDER#: [id number]                          ORDERED BY: Betsey Jorge   SOURCE: Blood                              COLLECTED:  01/10/21 15:41   ANTIBIOTICS AT [facility] :                      RECEIVED :  01/10/21 15:41   CALL  [facility]  [id number] tel. [phone],   Previous panic on this admission - call not needed per SOP, 01/11/2021 07:03,   by Uriel Palmer   If child <=2 yrs old please draw pediatric bottle. ~Blood Culture #2   Performed at:   [address] [place] [address]   Phone [phone]   Culture, Blood 1 [[id number]] (Abnormal) Collected: 01/10/21 1310   Order Status: Completed Specimen: Blood Updated: 01/12/21 0724    Blood Culture, Routine --Abnormal     Gram stain Aerobic bottle:   Gram negative rods   Information to follow   Gram stain Anaerobic bottle:   Gram negative rods   Information to follow   Abnormal     Organism Klebsiella pneumoniae DNA DetectedAbnormal     Blood Culture, Routine See additional report for complete BCID panel. Organism Klebsiella pneumoniaeAbnormal     Blood Culture, Routine --    POSITIVE for   Sensitivity to follow    Isolated two of two sets    Narrative:     ORDER#: [id number]                          ORDERED BY: Rolfe Ganser   SOURCE: Blood                              COLLECTED:  01/10/21 13:10   ANTIBIOTICS AT [facility] :                      RECEIVED :  01/10/21 13:17   CALL  [facility]  [id number] telPeder Rey [phone], [phone]   Microbiology results called to and read back by nancy Gutierrez,   01/11/2021 07:07, by Uriel Palmer   Microbiology results called to and read back by DUY Veronica, 01/11/2021   07:07, by Uriel Palmer   If child <=2 yrs old please draw pediatric bottle. ~Blood Culture 1   Performed at:   [place] [address] [address] [place] [address]   Phone [phone]   Culture, Blood 1 [[id number]]    Order Status: Sent Specimen: Blood    Culture, Blood 2 [[id number]]    Order Status: Sent Specimen: Blood    MRSA DNA Probe, Nasal [[id number]] Collected: 01/10/21 1400   Order Status: Completed Specimen: Nares Updated: 01/11/21 2251    MRSA SCREEN RT-PCR --    Negative  MRSA DNA not detected.    Normal Range: Not detected    Narrative:     ORDER#: [id number]                          ORDERED BY: Fanny Verduzco   SOURCE: Nares                              COLLECTED:  01/10/21 14:00   ANTIBIOTICS AT [facility] :                      RECEIVED :  01/10/21 14:03   Performed at:   [address] [facility] [address]   Phone [phone]   Strep Pneumoniae Antigen [[id number]] Collected: 01/10/21 1130   Order Status: Completed Specimen: Urine, clean catch Updated: 01/11/21 1347    STREP PNEUMONIAE ANTIGEN, URINE --    Presumptive Negative   Presumptive negative suggests no current or recent   pneumococcal infection. Infection due to Strep pneumoniae   cannot be ruled out since the antigen present in the sample   may be below the detection limit of the test.   Normal Range:Presumptive Negative    Narrative:     ORDER#: [id number]                          ORDERED BY: Fanny Verduzco   SOURCE: Urine Clean Catch                  COLLECTED:  01/10/21 11:30   ANTIBIOTICS AT [facility] :                      RECEIVED :  01/10/21 11:49   Performed at:   [address] [facility] [place] [address]   Phone [phone]   Legionella Antigen, Urine [[id number]] Collected: 01/10/21 1130   Order Status: Completed Specimen: Urine, clean catch Updated: 01/11/21 1346    L. pneumophila Serogp 1 Ur Ag --    Presumptive Negative   No Legionella pneumophila serogroup 1 antigens detected. A negative result does not exclude infection with   Legionella pneumophila serogroup 1 nor does it rule out   other microbial-caused respiratory infections or   disease caused by other serogroups of   Legionella pneumophila. Normal Range: Presumptive Negative    Narrative:     ORDER#: [id number]                          ORDERED BY: Fanny Verduzco   SOURCE: Urine Clean Catch                  COLLECTED:  01/10/21 11:30   ANTIBIOTICS AT [facility] :                      RECEIVED :  01/10/21 11:49   Performed at:   [address] [place] [address] [facility]   [address] [place], [place] [address]   Phone [phone]   Culture, Blood, PCR ID Panel Results Report [[id number]] Collected: 01/10/21 1310   Order Status: Completed Updated: 01/11/21 0709    Report SEE IMAGE   Narrative:     CALL [facility] [phone],  Alma Cifuentes   Microbiology results called to and read back by pharmacist Leigh Hankins,   01/11/2021 07:07, by [facility]   Microbiology results called to and read back by DUY Martinez, 01/11/2021   07:07, by [facility]   Referred out by:   [facility]   [address] [place], [place] [address]   Phone [phone]   Culture, Respiratory [[id number]]    Order Status: No result Specimen: Sputum Expectorated    Culture, Blood 1 [[id number]] Collected: 12/28/20 2140   Order Status: Completed Specimen: Blood Updated: 01/02/21 1415    Blood Culture, Routine No Growth after 4 days of incubation. Narrative:     ORDER#: [id number]                          ORDERED BY: Kartik Wright   SOURCE: Blood Antecubital-Lef              COLLECTED:  12/28/20 21:40   ANTIBIOTICS AT [facility] :                      RECEIVED :  12/29/20 13:55   If child <=2 yrs old please draw pediatric bottle. ~Blood Culture #1   Performed at:   [facility]   [address] [place], [place] [address]   Phone [phone]   Culture, Blood 2 [[id number]] Collected: 12/28/20 2215   Order Status: Completed Specimen: Blood Updated: 01/02/21 1415    Culture, Blood 2 No Growth after 4 days of incubation. Narrative:     ORDER#: [id number]                          ORDERED BY: Kartik Wright   SOURCE: Blood Antecubital-Rig              COLLECTED:  12/28/20 22:15   ANTIBIOTICS AT [facility] :                      RECEIVED :  12/29/20 13:55   If child <=2 yrs old please draw pediatric bottle. ~Blood Culture #2   Performed at:   [facility]   [address] [address] [place], [place] [address]   Phone [phone]     Klebsiella pneumoniae (1)    Antibiotic Interpretation MARIA ELENA Status    ampicillin Resistant >=32 mcg/mL     ceFAZolin Sensitive <=4 mcg/mL      NOTE: Cefazolin should only be used for uncomplicated UTI         for E.coli or Klebsiella pneumoniae. cefepime Sensitive <=0.12 mcg/mL     cefTRIAXone Sensitive <=0.25 mcg/mL     ciprofloxacin Sensitive <=0.25 mcg/mL     ertapenem Sensitive <=0.12 mcg/mL     gentamicin Sensitive <=1 mcg/mL     levofloxacin Sensitive <=0.12 mcg/mL     nitrofurantoin Sensitive <=16 mcg/mL     piperacillin-tazobactam Sensitive <=4 mcg/mL     trimethoprim-sulfamethoxazole Sensitive <=20 mcg/mL         Lab Results   Component Value Date    [facility]  01/13/2021     No Growth to date. Any change in status will be called. BLOODCULT2  01/13/2021     No Growth to date. Any change in status will be called. Respiratory Culture:  No results found for: Yulia Pérez  AFB:No results found for: AFBSMEAR  Viral Culture:  Lab Results   Component Value Date    COVID19 DETECTED 12/28/2020     Urine Culture:   No results for input(s): Miguelina Murray in the last 72 hours. IMAGING:    XR CHEST PORTABLE   Final Result   Slight interval improvement in appearance of diffuse multifocal airspace   opacities, likely a combination of multifocal pneumonia and superimposed   pulmonary edema. XR CHEST PORTABLE   Final Result   Stable diffuse bilateral lung disease compatible with pneumonia and/or edema         CT CHEST PULMONARY EMBOLISM W CONTRAST   Final Result   No evidence of pulmonary embolism. Mildly dilated and atherosclerotic thoracic aorta with no aneurysm or   dissection. Extensive ground-glass opacities throughout both lungs some and subsegmental   bibasilar opacities which could represent extensive multisegmental   bronchopneumonia and/or pulmonary edema vs pulmonary hemorrhage. Small left pleural effusion.       Small lymph nodes scattered in the mediastinum which are process. Unfortunately she did have a drop in hemoglobin with suspected GI bleed. She is requiring blood transfusion also lactic acid was elevated now improving  . GI has been consulted. Anticoagulation currently on hold    Labs, Microbiology, Radiology and all the pertinent results from current hospitalization and  care every where were reviewed  by me as a part of the evaluation   Plan:   1. Cont IV Cefepime x 2 gm q 12 hrs will cover Bacteremia, UTI and PNA will be able to choose oral abx on Monday   2. Klebsiella sensitivities noted   3. Repeat Blood cx  on 1/13 NGTD  4. Cont supportive care  5. She completed the Treatment course for COVID and now on supportive care with Oxygen supplementation  6. CRP, D dimer elevation now likely from sepsis    7. On steroids per Pulmonary  8. Has oral thrush - Fluconaozle oral x stop 1/19  9. GI bleed s/p PRBC and anticoagulation on hold       Discussed with patient/Family and Nursing staff   Risk of Complications/Morbidity: High      · Illness(es)/ Infection present that pose threat to bodily function. · There is potential for severe exacerbation of infection/side effects of treatment. · Therapy requires intensive monitoring for antimicrobial agent toxicity. Thanks for allowing me to participate in your patient's care and please call me with any questions or concerns.     Nikolas Gonsalez MD  Infectious Disease  [facility] ([facility]) Physician  Phone: [phone]   Fax : [phone]

## 2021-01-15 NOTE — PROGRESS NOTES
Hospitalist Progress Note      PCP: Magdalene Aldrich MD    Date of Admission: 12/28/2020    Subjective: cont to feel poorly, but wants everything done, still on 15L    Medications:  Reviewed    Infusion Medications    sodium chloride      pantoprozole (PROTONIX) infusion 8 mg/hr (01/15/21 0908)    sodium chloride 50 mL/hr at 01/15/21 1618    sodium chloride      dextrose       Scheduled Medications    sodium chloride (PF)  10 mL Intravenous Daily    sodium chloride flush  10 mL Intravenous 2 times per day    predniSONE  60 mg Oral Daily    fluconazole  100 mg Oral Daily    lidocaine 1 % injection  5 mL Intradermal Once    cefepime  2 g Intravenous Q12H    senna  1 tablet Oral Nightly    polyethylene glycol  17 g Oral Daily    budesonide-formoterol  2 puff Inhalation BID    [Held by provider] enoxaparin  40 mg Subcutaneous BID    [Held by provider] amLODIPine  5 mg Oral Daily    [Held by provider] lisinopril  30 mg Oral Daily    [Held by provider] metoprolol tartrate  100 mg Oral BID    [Held by provider] aspirin  81 mg Oral Daily    levothyroxine  75 mcg Oral Daily    atorvastatin  40 mg Oral Daily    insulin lispro  0-6 Units Subcutaneous TID WC    insulin lispro  0-3 Units Subcutaneous Nightly    anastrozole  1 mg Oral Daily    DULoxetine  60 mg Oral Daily     PRN Meds: sodium chloride, sodium chloride flush, LORazepam, albuterol sulfate HFA **AND** ipratropium **AND** MDI Treatment, sodium chloride, acetaminophen **OR** acetaminophen, glucose, dextrose, glucagon (rDNA), dextrose, ondansetron, guaiFENesin-dextromethorphan, sodium chloride      Intake/Output Summary (Last 24 hours) at 1/15/2021 1838  Last data filed at 1/15/2021 0631  Gross per 24 hour   Intake 1089.58 ml   Output 425 ml   Net 664.58 ml       Physical Exam Performed:    BP (!) 190/96   Pulse 132   Temp 97.9 °F (36.6 °C) (Oral)   Resp 20   Ht 5' 6\" (1.676 m)   Wt 182 lb 12.2 oz (82.9 kg)   SpO2 91%   BMI 29.50 kg/m²     General appearance: fatigued, pallor+  HEENT: Conjunctivae/corneas clear, neck supple w/ full ROM  Respiratory:  Normal respiratory effort. Faint bilateral rales  Cardiovascular: Regular rhythm, tachycardic, normal S1/S2, no murmurs  Abdomen: Soft, non-tender, non-distended with normal bowel sounds. Musculoskeletal: No edema bilaterally  Neurologic:  No new focal deficits  Psychiatric: Alert and oriented,  Capillary Refill: Brisk,< 3 seconds   Peripheral Pulses: +2 palpable, equal bilaterally    Labs:   Recent Labs     01/13/21  0622 01/14/21  0545 01/14/21  0545 01/14/21  2010 01/15/21  0215 01/15/21  0908   WBC 9.3 14.0*  --   --   --  11.7*   HGB 10.2* 6.5*   < > 7.2* 6.7* 7.9*   HCT 31.7* 20.3*   < > 21.4* 21.1* 24.4*    280  --   --   --  182    < > = values in this interval not displayed. Recent Labs     01/13/21  0622 01/14/21  0545 01/15/21  0908    142 141   K 3.9 3.8 3.4*    113* 112*   CO2 23 22 22   BUN 27* 44* 31*   CREATININE 0.7 0.9 0.7   CALCIUM 8.1* 7.5* 7.7*     Recent Labs     01/13/21  0622 01/14/21  0545 01/15/21  0908   AST 16 20 19   ALT 13 16 16   BILITOT 0.3 <0.2 <0.2   ALKPHOS 80 56 65     Recent Labs     01/14/21  1622   INR 1.11     Recent Labs     01/14/21  0545   TROPONINI <0.01       Urinalysis:      Lab Results   Component Value Date    NITRU Negative 01/10/2021    WBCUA 153 01/10/2021    BACTERIA 4+ 01/10/2021    RBCUA 34 01/10/2021    BLOODU LARGE 01/10/2021    SPECGRAV 1.021 01/10/2021    GLUCOSEU Negative 01/10/2021       Radiology:  XR CHEST PORTABLE   Final Result   Slight interval improvement in appearance of diffuse multifocal airspace   opacities, likely a combination of multifocal pneumonia and superimposed   pulmonary edema. XR CHEST PORTABLE   Final Result   Stable diffuse bilateral lung disease compatible with pneumonia and/or edema         CT CHEST PULMONARY EMBOLISM W CONTRAST   Final Result   No evidence of pulmonary embolism. Mildly dilated and atherosclerotic thoracic aorta with no aneurysm or   dissection. Extensive ground-glass opacities throughout both lungs some and subsegmental   bibasilar opacities which could represent extensive multisegmental   bronchopneumonia and/or pulmonary edema vs pulmonary hemorrhage. Small left pleural effusion. Small lymph nodes scattered in the mediastinum which are not pathologic by   size criteria. Moderate degenerative changes and postop changes in the spine      9 mm hypodensity left lobe liver which could represent a cyst but is   ill-defined. Suggest ultrasound correlation.          XR CHEST PORTABLE   Final Result   Bilateral ill-defined airspace opacities could represent atypical pneumonia,   multifocal bacterial pneumonia or subsegmental atelectasis                 Assessment/Plan:    Active Hospital Problems    Diagnosis    Pneumonia due to COVID-19 virus [U07.1, J12.82]    Hyperglycemia [R73.9]    Leukocytosis [D72.829]    Acute respiratory failure with hypoxia ([facility] [address] [J96.01]    2019 novel coronavirus-infected pneumonia (NCIP) [U07.1, J12.82]    Elevated LDH [R74.02]    Elevated AST (SGOT) [R74.01]    Elevated ferritin [R79.89]    Elevated d-dimer [R79.89]    History of depression [Z86.59]    Essential hypertension [I10]    Class 2 obesity due to excess calories with body mass index (BMI) of 35.0 to 35.9 in adult [E66.09, Z68.35]    Suspected COVID-19 virus infection [Z20.822]    Hypothyroidism [E03.9]    HLD (hyperlipidemia) [E78.5]    PCOS (polycystic ovarian syndrome) [E28.2]    Pneumonia due to organism [J18.9]         COVID 19 PNA - improved  - s/p Increased to 20 mg x 5 days then 10 mg x 5 days  - completed remdesivir day 5/5  - s/p convalescent plasma 12/30/2020  - monitor inflammatory markers  - added Symbicort and continue albuterol/ipatropium  - PLEASE AVOID NARCOTICS     Sepsis 2/2 Klebsiella UTI, Klebsiella bacteremia  Meets at least 2 SIRS Criteria:  Tachypnea > 20  HR > 90  WBC > 12K  Source: urine  -lactic acid q6h  -blood cultures positive for Klebsiella   -repeat blood cultures 1/11  -IV antibiotics: Vanc and cefepime -> cefepime  -IVF: 30 cc/kg given, followed by maintenance ivf  -WBC count and procalcitonin improved  -though she has been on high dose steroids, my concern is with a secondary bacterial process  -CTA Chest with contrast showed extensive ground-glass opacities throughout both lungs, small left pleural effusion, and a 9 mm hypodensity left lobe of the liver that could represent a cyst  -ID consulted, recs appreciated     Acute GI bleed - suspect 2/2 upper source, started on IV PPI gtt, GI consulted. Hold ASA/lovenox, monitor hb, unable to safely perform EGD 2/2 resp status, appr GI recs     Acute blood loss anemia - hb dropped to 6.5, transfused blood and monitor hb Q6H, repeat hb trending down to 6.7, will repeat blood transfusion and monitor hb     Liver lesion  - consider CT Ab/Pelvis or abdominal ultrasound inpt vs outpt     Acute respiratory failure with hypoxia -   - improved, now on vapotherm at 30L-->15L-->30L-->improved to 10L-->worsened to 15L  - consulted pulmonology      HTN - hypotensive   - hold home meds     Hx breast cancer  - c/w arimidex     HLD  - c/w statin     Hypothroidism  - c/w levothyroxine      DM 2  - hold metformin  - ssi, monitor on steroids  - a1c - 6.0     Depression  - c/w home meds     Constipation  - received Dulcolax suppository and enema  - continue Miralax  - add Senna nightly        DVT Prophylaxis: SCD  Diet: DIET CLEAR LIQUID; Carb Control: 4 carb choices (60 gms)/meal  Code Status: Full Code    PT/OT Eval Status: ordered    Dispo - Discussed with pt, she wishes to be full code and wants everything done. Wants her son to be her POA.  Cont care, guarded prognosis    Praneeth Moody MD

## 2021-01-15 NOTE — PROGRESS NOTES
Hospitalist Progress Note      PCP: Grady Cheadle, MD    Date of Admission: 12/28/2020    Subjective: had a rapid response this am 2/2 hypotension, hb dropped to ~6, received blood.  Later developed coffee ground emesis with hematochezia    Medications:  Reviewed    Infusion Medications    sodium chloride      pantoprozole (PROTONIX) infusion 8 mg/hr (01/14/21 1516)    sodium chloride 75 mL/hr at 01/14/21 1358    sodium chloride      dextrose       Scheduled Medications    sodium chloride (PF)  10 mL Intravenous Daily    sodium chloride flush  10 mL Intravenous 2 times per day    predniSONE  60 mg Oral Daily    fluconazole  100 mg Oral Daily    lidocaine 1 % injection  5 mL Intradermal Once    cefepime  2 g Intravenous Q12H    senna  1 tablet Oral Nightly    polyethylene glycol  17 g Oral Daily    budesonide-formoterol  2 puff Inhalation BID    [Held by provider] enoxaparin  40 mg Subcutaneous BID    [Held by provider] amLODIPine  5 mg Oral Daily    [Held by provider] lisinopril  30 mg Oral Daily    [Held by provider] metoprolol tartrate  100 mg Oral BID    [Held by provider] aspirin  81 mg Oral Daily    levothyroxine  75 mcg Oral Daily    atorvastatin  40 mg Oral Daily    insulin lispro  0-6 Units Subcutaneous TID WC    insulin lispro  0-3 Units Subcutaneous Nightly    anastrozole  1 mg Oral Daily    DULoxetine  60 mg Oral Daily     PRN Meds: sodium chloride, sodium chloride flush, LORazepam, albuterol sulfate HFA **AND** ipratropium **AND** MDI Treatment, sodium chloride, acetaminophen **OR** acetaminophen, glucose, dextrose, glucagon (rDNA), dextrose, ondansetron, guaiFENesin-dextromethorphan, sodium chloride      Intake/Output Summary (Last 24 hours) at 1/14/2021 2037  Last data filed at 1/14/2021 1646  Gross per 24 hour   Intake 4853 ml   Output 500 ml   Net 4353 ml       Physical Exam Performed:    /69   Pulse 121   Temp 98.3 °F (36.8 °C) (Oral)   Resp 23   Ht 5' 6\" (1.676 m)   Wt 183 lb 3.2 oz (83.1 kg)   SpO2 99%   BMI 29.57 kg/m²     General appearance: fatigued, pallor+  HEENT: Conjunctivae/corneas clear, neck supple w/ full ROM  Respiratory:  Normal respiratory effort. Faint bilateral rales  Cardiovascular: Regular rhythm, tachycardic, normal S1/S2, no murmurs  Abdomen: Soft, non-tender, non-distended with normal bowel sounds. Musculoskeletal: No edema bilaterally  Neurologic:  No new focal deficits  Psychiatric: Alert and oriented,  Capillary Refill: Brisk,< 3 seconds   Peripheral Pulses: +2 palpable, equal bilaterally        Labs:   Recent Labs     01/12/21  0748 01/13/21  0622 01/14/21  0545 01/14/21  1312 01/14/21 2010   WBC 12.3* 9.3 14.0*  --   --    HGB 9.6* 10.2* 6.5* 8.1* 7.2*   HCT 30.1* 31.7* 20.3* 24.9* 21.4*    220 280  --   --      Recent Labs     01/12/21  0748 01/13/21  0622 01/14/21  0545    142 142   K 3.5 3.9 3.8   * 109 113*   CO2 22 23 22   BUN 23* 27* 44*   CREATININE 0.8 0.7 0.9   CALCIUM 8.1* 8.1* 7.5*     Recent Labs     01/12/21  0748 01/13/21  0622 01/14/21  0545   AST 19 16 20   ALT 15 13 16   BILITOT 0.3 0.3 <0.2   ALKPHOS 95 80 56     Recent Labs     01/14/21  1622   INR 1.11     Recent Labs     01/14/21  0545   TROPONINI <0.01       Urinalysis:      Lab Results   Component Value Date    NITRU Negative 01/10/2021    WBCUA 153 01/10/2021    BACTERIA 4+ 01/10/2021    RBCUA 34 01/10/2021    BLOODU LARGE 01/10/2021    SPECGRAV 1.021 01/10/2021    GLUCOSEU Negative 01/10/2021       Radiology:  XR CHEST PORTABLE   Final Result   Slight interval improvement in appearance of diffuse multifocal airspace   opacities, likely a combination of multifocal pneumonia and superimposed   pulmonary edema. XR CHEST PORTABLE   Final Result   Stable diffuse bilateral lung disease compatible with pneumonia and/or edema         CT CHEST PULMONARY EMBOLISM W CONTRAST   Final Result   No evidence of pulmonary embolism.       Mildly dilated and atherosclerotic thoracic aorta with no aneurysm or   dissection. Extensive ground-glass opacities throughout both lungs some and subsegmental   bibasilar opacities which could represent extensive multisegmental   bronchopneumonia and/or pulmonary edema vs pulmonary hemorrhage. Small left pleural effusion. Small lymph nodes scattered in the mediastinum which are not pathologic by   size criteria. Moderate degenerative changes and postop changes in the spine      9 mm hypodensity left lobe liver which could represent a cyst but is   ill-defined. Suggest ultrasound correlation.          XR CHEST PORTABLE   Final Result   Bilateral ill-defined airspace opacities could represent atypical pneumonia,   multifocal bacterial pneumonia or subsegmental atelectasis                 Assessment/Plan:    Active Hospital Problems    Diagnosis    Pneumonia due to COVID-19 virus [U07.1, J12.82]    Hyperglycemia [R73.9]    Leukocytosis [D72.829]    Acute respiratory failure with hypoxia ([facility] [address] [J96.01]    2019 novel coronavirus-infected pneumonia (NCIP) [U07.1, J12.82]    Elevated LDH [R74.02]    Elevated AST (SGOT) [R74.01]    Elevated ferritin [R79.89]    Elevated d-dimer [R79.89]    History of depression [Z86.59]    Essential hypertension [I10]    Class 2 obesity due to excess calories with body mass index (BMI) of 35.0 to 35.9 in adult [E66.09, Z68.35]    Suspected COVID-19 virus infection [Z20.822]    Hypothyroidism [E03.9]    HLD (hyperlipidemia) [E78.5]    PCOS (polycystic ovarian syndrome) [E28.2]    Pneumonia due to organism [J18.9]         COVID 19 PNA - improved  - s/p Increased to 20 mg x 5 days then 10 mg x 5 days  - completed remdesivir day 5/5  - s/p convalescent plasma 12/30/2020  - monitor inflammatory markers  - added Symbicort and continue albuterol/ipatropium  - PLEASE AVOID NARCOTICS     Sepsis 2/2 Klebsiella UTI, Klebsiella bacteremia  Meets at least 2 SIRS Criteria:  Tachypnea > 20  HR > 90  WBC > 12K  Source: urine  -lactic acid q6h  -blood cultures positive for Klebsiella   -repeat blood cultures 1/11  -IV antibiotics: Vanc and cefepime -> cefepime  -IVF: 30 cc/kg given, followed by maintenance ivf  -WBC count and procalcitonin improved  -though she has been on high dose steroids, my concern is with a secondary bacterial process  -CTA Chest with contrast showed extensive ground-glass opacities throughout both lungs, small left pleural effusion, and a 9 mm hypodensity left lobe of the liver that could represent a cyst  -ID consulted, recs appreciated    Acute GI bleed - suspect 2/2 upper source, started on IV PPI gtt, GI consulted.  Hold ASA/lovenox, check hb    Acute blood loss anemia - hb dropped to ~6, transfuse blood and monitor hb Q6H, transfuse if <7     Liver lesion  - consider CT Ab/Pelvis or abdominal ultrasound inpt vs outpt     Acute respiratory failure with hypoxia -   - improved, now on vapotherm at 30L-->15L-->30L-->improved to 10L-->worsened to 15L  - consulted pulmonology      HTN - hypotensive today  - hold home meds     Hx breast cancer  - c/w arimidex     HLD  - c/w statin     Hypothroidism  - c/w levothyroxine      DM 2  - hold metformin  - ssi, monitor on steroids  - a1c - 6.0     Depression  - c/w home meds     Constipation  - received Dulcolax suppository and enema  - continue Miralax  - add Senna nightly        DVT Prophylaxis: SCD  Diet: DIET CLEAR LIQUID; Carb Control: 4 carb choices (60 gms)/meal  Code Status: Full Code    PT/OT Eval Status: ordered    Vanessa De Souza MD

## 2021-01-15 NOTE — PROGRESS NOTES
Occupational Therapy  Unable to see pt at this time due to RN requesting to hold due to current medical status. Will D/C pt from OT services at this time. Will need new orders when medically appropriate.     Shila Dobbins, OTR/L

## 2021-01-15 NOTE — PROGRESS NOTES
Physical Therapy  Attempt Note   Discharge Due to Medical Status    Kiesha Chavez Neighbor  AJP:[id number]  JDB:[id number]  Room: M8V-9653/5252-01    Date of Service: 1/15/2021     Patient chart reviewed. PT attempted to see for PT tx at this time. Due to medical status at this time, discharge from PT at this time due to safety concerns. Discussed with nursing at this time. Please reorder PT when medically stable and able to participate with functional mobility tasks. If patient is discharged prior to the next physical therapy visit; Please see last written PT note for discharge status.              Electronically signed by Sarina Rosenthal PT on 1/15/2021 at 9:09 AM

## 2021-01-15 NOTE — PLAN OF CARE
Problem: Pain:  Description: Pain management should include both nonpharmacologic and pharmacologic interventions. Goal: Pain level will decrease  Description: Pain level will decrease  Outcome: Ongoing  Goal: Control of acute pain  Description: Control of acute pain  Outcome: Ongoing  Goal: Control of chronic pain  Description: Control of chronic pain  Outcome: Ongoing     Problem: Airway Clearance - Ineffective  Goal: Achieve or maintain patent airway  Outcome: Ongoing     Problem: Gas Exchange - Impaired  Goal: Absence of hypoxia  Outcome: Ongoing  Goal: Promote optimal lung function  Outcome: Ongoing     Problem: Breathing Pattern - Ineffective  Goal: Ability to achieve and maintain a regular respiratory rate  Outcome: Ongoing     Problem:  Body Temperature -  Risk of, Imbalanced  Goal: Ability to maintain a body temperature within defined limits  Outcome: Ongoing  Goal: Will regain or maintain usual level of consciousness  Outcome: Ongoing  Goal: Complications related to the disease process, condition or treatment will be avoided or minimized  Outcome: Ongoing     Problem: Isolation Precautions - Risk of Spread of Infection  Goal: Prevent transmission of infection  Outcome: Ongoing     Problem: Nutrition Deficits  Goal: Optimize nutrtional status  Outcome: Ongoing     Problem: Risk for Fluid Volume Deficit  Goal: Maintain normal heart rhythm  Outcome: Ongoing  Goal: Maintain absence of muscle cramping  Outcome: Ongoing  Goal: Maintain normal serum potassium, sodium, calcium, phosphorus, and pH  Outcome: Ongoing     Problem: Loneliness or Risk for Loneliness  Goal: Demonstrate positive use of time alone when socialization is not possible  Outcome: Ongoing     Problem: Fatigue  Goal: Verbalize increase energy and improved vitality  Outcome: Ongoing     Problem: Patient Education: Go to Patient Education Activity  Goal: Patient/Family Education  Outcome: Ongoing     Problem: Falls - Risk of:  Goal: Will remain free from falls  Description: Will remain free from falls  Outcome: Ongoing  Goal: Absence of physical injury  Description: Absence of physical injury  Outcome: Ongoing     Problem: Skin Integrity:  Goal: Will show no infection signs and symptoms  Description: Will show no infection signs and symptoms  Outcome: Ongoing  Goal: Absence of new skin breakdown  Description: Absence of new skin breakdown  Outcome: Ongoing     Problem: Nutrition  Goal: Optimal nutrition therapy  1/14/2021 2300 by Aubrey Son RN  Outcome: Ongoing  1/14/2021 1321 by Erinn Marshall RD, LD  Outcome: Ongoing     Problem: Discharge Planning:  Goal: Discharged to appropriate level of care  Description: Discharged to appropriate level of care  Outcome: Ongoing     Problem: Bowel Function - Altered:  Goal: Bowel elimination is within specified parameters  Description: Bowel elimination is within specified parameters  Outcome: Ongoing     Problem: Fluid Volume - Imbalance:  Description: Avoid the routine use of orogastric or nasogastric lavage.   Goal: Absence of imbalanced fluid volume signs and symptoms  Description: Absence of imbalanced fluid volume signs and symptoms  Outcome: Ongoing  Goal: Will show no signs and symptoms of excessive bleeding  Description: Will show no signs and symptoms of excessive bleeding  Outcome: Ongoing     Problem: Nausea/Vomiting:  Goal: Absence of nausea/vomiting  Description: Absence of nausea/vomiting  Outcome: Ongoing  Goal: Able to drink  Description: Able to drink  Outcome: Ongoing  Goal: Able to eat  Description: Able to eat  Outcome: Ongoing  Goal: Ability to achieve adequate nutritional intake will improve  Description: Ability to achieve adequate nutritional intake will improve  Outcome: Ongoing

## 2021-01-15 NOTE — CONSULTS
[address]                                  CONSULTATION    PATIENT NAME: Angela Murray                      :        1955  MED REC NO:   [id number]                          ROOM:       5252  ACCOUNT NO:   [de-identified]                           ADMIT DATE: 2020  PROVIDER:     Christiano Ramos MD    CONSULT DATE:  2021    REFERRING PROVIDERS:  Mikey Kamara MD    HISTORY OF PRESENT ILLNESS:  The patient is a 82-year-old white female  admitted with acute respiratory failure and COVID-19 pneumonia. She  also was found to have a Klebsiella urinary tract infection with  Klebsiella bacteremia. The patient's hypoxia has continued to slowly  improve. Earlier this morning, she experienced coffee-ground emesis and  passed black tarry stool. She denied any abdominal pain but said that  she simply does not feel good. Her hemoglobin level decreased from 10.2  to 6.5 and the BUN increased to 44. The patient had been treated with  aspirin and Lovenox which have now been held. She was just started on  an IV pantoprazole continuous infusion. PAST MEDICAL HISTORY:  This is remarkable for hypertension,  hypothyroidism, obesity, arthritis, depression, and breast cancer. Previous abdominal surgeries have included a dilatation and curettage  and hysterectomy. MEDICATIONS:  Please refer to the order tab for the patient's current  drug treatment. As an outpatient, the patient had been taking aspirin  and naproxen. ALLERGIES:  An allergy to MERCURY was listed. FAMILY HISTORY:  Not obtained. SOCIAL HISTORY:  The patient is a nonsmoker. PHYSICAL EXAMINATION:  VITAL SIGNS:  Blood pressure 114/68, pulse 112, temperature 98.2 degrees  orally, respirations 40 and labored.   GENERAL:  In general, the patient appeared as an alert and cooperative elderly white female in moderate respiratory distress. She was  tachypneic and was dyspneic with simple speech. IMPRESSION:  This appears to be obvious upper GI bleeding. Under  ordinary circumstances, the next step would be an EGD. However, the  patient has a multifocal pneumonia with COVID-19 and has significant  respiratory compromise as described above. PLAN:  The IV pantoprazole will be continued and we will hold the above  anticoagulants. The hemoglobin will be followed with transfusion as  necessary. At this time, endoscopic evaluation does not appear to be  safe given the patient's respiratory status. However, if she becomes  hemodynamically unstable and continues to bleed, an EGD will be  performed but the patient would likely require endotracheal intubation. This plan was discussed with the patient. ERIKA Royal MD    D: 01/14/2021 14:34:56       T: 01/14/2021 16:25:38     [email or web address]  Job#: [id number]     Doc#: [id number]    CC:  MD Isabelle Carlson MD

## 2021-01-15 NOTE — PROGRESS NOTES
[facility]    After consultation with the Anesthesiology service, it was determined that the patient is not a safe candidate for an EGD due to severe respiratory compromise  The only option is to monitor the Hgb with transfusion as necessary and continue acid suppression  Clear liquids po only  Dr. Yaz Garcia will cover over the weekend

## 2021-01-16 LAB
A/G RATIO: 0.8 (ref 1.1–2.2)
ALBUMIN SERPL-MCNC: 2.1 G/DL (ref 3.4–5)
ALP BLD-CCNC: 70 U/L (ref 40–129)
ALT SERPL-CCNC: 16 U/L (ref 10–40)
ANION GAP SERPL CALCULATED.3IONS-SCNC: 8 MMOL/L (ref 3–16)
AST SERPL-CCNC: 15 U/L (ref 15–37)
BILIRUB SERPL-MCNC: <0.2 MG/DL (ref 0–1)
BUN BLDV-MCNC: 22 MG/DL (ref 7–20)
CALCIUM SERPL-MCNC: 8 MG/DL (ref 8.3–10.6)
CHLORIDE BLD-SCNC: 112 MMOL/L (ref 99–110)
CO2: 25 MMOL/L (ref 21–32)
CREAT SERPL-MCNC: 0.7 MG/DL (ref 0.6–1.2)
D DIMER: 3281 NG/ML DDU (ref 0–229)
GFR AFRICAN AMERICAN: >60
GFR NON-AFRICAN AMERICAN: >60
GLOBULIN: 2.5 G/DL
GLUCOSE BLD-MCNC: 134 MG/DL (ref 70–99)
GLUCOSE BLD-MCNC: 135 MG/DL (ref 70–99)
GLUCOSE BLD-MCNC: 151 MG/DL (ref 70–99)
GLUCOSE BLD-MCNC: 159 MG/DL (ref 70–99)
GLUCOSE BLD-MCNC: 159 MG/DL (ref 70–99)
HCT VFR BLD CALC: 23.1 % (ref 36–48)
HCT VFR BLD CALC: 23.5 % (ref 36–48)
HCT VFR BLD CALC: 23.7 % (ref 36–48)
HCT VFR BLD CALC: 24.3 % (ref 36–48)
HCT VFR BLD CALC: 28.8 % (ref 36–48)
HEMOGLOBIN: 7.7 G/DL (ref 12–16)
HEMOGLOBIN: 7.7 G/DL (ref 12–16)
HEMOGLOBIN: 7.8 G/DL (ref 12–16)
HEMOGLOBIN: 7.9 G/DL (ref 12–16)
HEMOGLOBIN: 9.6 G/DL (ref 12–16)
PERFORMED ON: ABNORMAL
POTASSIUM REFLEX MAGNESIUM: 3.8 MMOL/L (ref 3.5–5.1)
PROCALCITONIN: 0.8 NG/ML (ref 0–0.15)
SODIUM BLD-SCNC: 145 MMOL/L (ref 136–145)
TOTAL PROTEIN: 4.6 G/DL (ref 6.4–8.2)

## 2021-01-16 PROCEDURE — 85014 HEMATOCRIT: CPT

## 2021-01-16 PROCEDURE — 6370000000 HC RX 637 (ALT 250 FOR IP): Performed by: STUDENT IN AN ORGANIZED HEALTH CARE EDUCATION/TRAINING PROGRAM

## 2021-01-16 PROCEDURE — 2580000003 HC RX 258: Performed by: INTERNAL MEDICINE

## 2021-01-16 PROCEDURE — 85379 FIBRIN DEGRADATION QUANT: CPT

## 2021-01-16 PROCEDURE — 2060000000 HC ICU INTERMEDIATE R&B

## 2021-01-16 PROCEDURE — 80053 COMPREHEN METABOLIC PANEL: CPT

## 2021-01-16 PROCEDURE — 6370000000 HC RX 637 (ALT 250 FOR IP): Performed by: FAMILY MEDICINE

## 2021-01-16 PROCEDURE — 6360000002 HC RX W HCPCS: Performed by: STUDENT IN AN ORGANIZED HEALTH CARE EDUCATION/TRAINING PROGRAM

## 2021-01-16 PROCEDURE — 2580000003 HC RX 258: Performed by: NURSE PRACTITIONER

## 2021-01-16 PROCEDURE — 2700000000 HC OXYGEN THERAPY PER DAY

## 2021-01-16 PROCEDURE — 84145 PROCALCITONIN (PCT): CPT

## 2021-01-16 PROCEDURE — 85018 HEMOGLOBIN: CPT

## 2021-01-16 PROCEDURE — 94761 N-INVAS EAR/PLS OXIMETRY MLT: CPT

## 2021-01-16 PROCEDURE — C9113 INJ PANTOPRAZOLE SODIUM, VIA: HCPCS | Performed by: INTERNAL MEDICINE

## 2021-01-16 PROCEDURE — 6360000002 HC RX W HCPCS: Performed by: INTERNAL MEDICINE

## 2021-01-16 PROCEDURE — 6370000000 HC RX 637 (ALT 250 FOR IP): Performed by: INTERNAL MEDICINE

## 2021-01-16 PROCEDURE — 94640 AIRWAY INHALATION TREATMENT: CPT

## 2021-01-16 RX ORDER — SODIUM CHLORIDE, SODIUM LACTATE, POTASSIUM CHLORIDE, CALCIUM CHLORIDE 600; 310; 30; 20 MG/100ML; MG/100ML; MG/100ML; MG/100ML
500 INJECTION, SOLUTION INTRAVENOUS ONCE
Status: COMPLETED | OUTPATIENT
Start: 2021-01-16 | End: 2021-01-16

## 2021-01-16 RX ORDER — ALBUTEROL SULFATE 90 UG/1
2 AEROSOL, METERED RESPIRATORY (INHALATION) EVERY 4 HOURS PRN
Status: DISCONTINUED | OUTPATIENT
Start: 2021-01-16 | End: 2021-01-23 | Stop reason: HOSPADM

## 2021-01-16 RX ADMIN — PANTOPRAZOLE SODIUM 8 MG/HR: 40 INJECTION, POWDER, FOR SOLUTION INTRAVENOUS at 13:10

## 2021-01-16 RX ADMIN — INSULIN LISPRO 1 UNITS: 100 INJECTION, SOLUTION INTRAVENOUS; SUBCUTANEOUS at 08:47

## 2021-01-16 RX ADMIN — LORAZEPAM 0.5 MG: 0.5 TABLET ORAL at 22:25

## 2021-01-16 RX ADMIN — ATORVASTATIN CALCIUM 40 MG: 40 TABLET, FILM COATED ORAL at 08:29

## 2021-01-16 RX ADMIN — ACETAMINOPHEN 650 MG: 325 TABLET ORAL at 02:35

## 2021-01-16 RX ADMIN — ONDANSETRON 4 MG: 2 INJECTION INTRAMUSCULAR; INTRAVENOUS at 22:25

## 2021-01-16 RX ADMIN — ONDANSETRON 4 MG: 2 INJECTION INTRAMUSCULAR; INTRAVENOUS at 01:12

## 2021-01-16 RX ADMIN — STANDARDIZED SENNA CONCENTRATE 8.6 MG: 8.6 TABLET ORAL at 20:25

## 2021-01-16 RX ADMIN — PANTOPRAZOLE SODIUM 8 MG/HR: 40 INJECTION, POWDER, FOR SOLUTION INTRAVENOUS at 01:12

## 2021-01-16 RX ADMIN — CEFEPIME HYDROCHLORIDE 2 G: 2 INJECTION, POWDER, FOR SOLUTION INTRAVENOUS at 11:48

## 2021-01-16 RX ADMIN — PREDNISONE 60 MG: 20 TABLET ORAL at 08:29

## 2021-01-16 RX ADMIN — Medication 10 ML: at 08:30

## 2021-01-16 RX ADMIN — DULOXETINE HYDROCHLORIDE 60 MG: 60 CAPSULE, DELAYED RELEASE ORAL at 08:29

## 2021-01-16 RX ADMIN — INSULIN LISPRO 1 UNITS: 100 INJECTION, SOLUTION INTRAVENOUS; SUBCUTANEOUS at 20:25

## 2021-01-16 RX ADMIN — ONDANSETRON 4 MG: 2 INJECTION INTRAMUSCULAR; INTRAVENOUS at 14:38

## 2021-01-16 RX ADMIN — LORAZEPAM 0.5 MG: 0.5 TABLET ORAL at 01:12

## 2021-01-16 RX ADMIN — ANASTROZOLE 1 MG: 1 TABLET ORAL at 08:29

## 2021-01-16 RX ADMIN — SODIUM CHLORIDE, POTASSIUM CHLORIDE, SODIUM LACTATE AND CALCIUM CHLORIDE 500 ML: 600; 310; 30; 20 INJECTION, SOLUTION INTRAVENOUS at 03:46

## 2021-01-16 RX ADMIN — LEVOTHYROXINE SODIUM 75 MCG: 0.07 TABLET ORAL at 06:05

## 2021-01-16 RX ADMIN — SODIUM CHLORIDE, PRESERVATIVE FREE 10 ML: 5 INJECTION INTRAVENOUS at 08:30

## 2021-01-16 RX ADMIN — INSULIN LISPRO 1 UNITS: 100 INJECTION, SOLUTION INTRAVENOUS; SUBCUTANEOUS at 17:16

## 2021-01-16 RX ADMIN — FLUCONAZOLE 100 MG: 100 TABLET ORAL at 08:29

## 2021-01-16 RX ADMIN — SODIUM CHLORIDE, PRESERVATIVE FREE 10 ML: 5 INJECTION INTRAVENOUS at 20:25

## 2021-01-16 RX ADMIN — CEFEPIME HYDROCHLORIDE 2 G: 2 INJECTION, POWDER, FOR SOLUTION INTRAVENOUS at 01:12

## 2021-01-16 RX ADMIN — Medication 2 PUFF: at 20:25

## 2021-01-16 NOTE — PROGRESS NOTES
Pt requested I sit with her until she falls asleep at 0200. Pt is very anxious despite 0.5 mg ativan given at 0112. Pt requested to be up to chair. Pt tolerated poorly and required NRB as her o2 sat went down to 84%. Once she sat down, pt recovered within 5 minutes and is now satting at 96%. HR continues to be elevated throughout the entirety of shift. Messaged Heidi Weaver NP to make aware of continuous elevated HR. Waiting on response. Will continue to monitor.  Electronically signed by Tasia Gutiérrez RN on 1/16/2021 at 3:10 AM

## 2021-01-17 LAB
A/G RATIO: 1 (ref 1.1–2.2)
ALBUMIN SERPL-MCNC: 2.3 G/DL (ref 3.4–5)
ALP BLD-CCNC: 66 U/L (ref 40–129)
ALT SERPL-CCNC: 16 U/L (ref 10–40)
ANION GAP SERPL CALCULATED.3IONS-SCNC: 6 MMOL/L (ref 3–16)
AST SERPL-CCNC: 16 U/L (ref 15–37)
BANDED NEUTROPHILS RELATIVE PERCENT: 1 % (ref 0–7)
BASOPHILS ABSOLUTE: 0 K/UL (ref 0–0.2)
BASOPHILS RELATIVE PERCENT: 0 %
BILIRUB SERPL-MCNC: <0.2 MG/DL (ref 0–1)
BLOOD CULTURE, ROUTINE: NORMAL
BUN BLDV-MCNC: 17 MG/DL (ref 7–20)
CALCIUM SERPL-MCNC: 7.9 MG/DL (ref 8.3–10.6)
CHLORIDE BLD-SCNC: 111 MMOL/L (ref 99–110)
CO2: 27 MMOL/L (ref 21–32)
CREAT SERPL-MCNC: 0.7 MG/DL (ref 0.6–1.2)
CULTURE, BLOOD 2: NORMAL
EOSINOPHILS ABSOLUTE: 0 K/UL (ref 0–0.6)
EOSINOPHILS RELATIVE PERCENT: 0 %
GFR AFRICAN AMERICAN: >60
GFR NON-AFRICAN AMERICAN: >60
GLOBULIN: 2.2 G/DL
GLUCOSE BLD-MCNC: 106 MG/DL (ref 70–99)
GLUCOSE BLD-MCNC: 121 MG/DL (ref 70–99)
GLUCOSE BLD-MCNC: 123 MG/DL (ref 70–99)
GLUCOSE BLD-MCNC: 165 MG/DL (ref 70–99)
GLUCOSE BLD-MCNC: 96 MG/DL (ref 70–99)
HCT VFR BLD CALC: 22.4 % (ref 36–48)
HCT VFR BLD CALC: 22.4 % (ref 36–48)
HCT VFR BLD CALC: 22.8 % (ref 36–48)
HCT VFR BLD CALC: 22.9 % (ref 36–48)
HEMOGLOBIN: 7.4 G/DL (ref 12–16)
HEMOGLOBIN: 7.6 G/DL (ref 12–16)
LYMPHOCYTES ABSOLUTE: 1 K/UL (ref 1–5.1)
LYMPHOCYTES RELATIVE PERCENT: 8 %
MAGNESIUM: 2 MG/DL (ref 1.8–2.4)
MCH RBC QN AUTO: 30.6 PG (ref 26–34)
MCHC RBC AUTO-ENTMCNC: 32.9 G/DL (ref 31–36)
MCV RBC AUTO: 92.9 FL (ref 80–100)
METAMYELOCYTES RELATIVE PERCENT: 2 %
MONOCYTES ABSOLUTE: 0.7 K/UL (ref 0–1.3)
MONOCYTES RELATIVE PERCENT: 6 %
NEUTROPHILS ABSOLUTE: 10.4 K/UL (ref 1.7–7.7)
NEUTROPHILS RELATIVE PERCENT: 83 %
NUCLEATED RED BLOOD CELLS: 2 /100 WBC
PDW BLD-RTO: 14.3 % (ref 12.4–15.4)
PERFORMED ON: ABNORMAL
PLATELET # BLD: 194 K/UL (ref 135–450)
PMV BLD AUTO: 8.7 FL (ref 5–10.5)
POLYCHROMASIA: ABNORMAL
POTASSIUM REFLEX MAGNESIUM: 3.4 MMOL/L (ref 3.5–5.1)
PROCALCITONIN: 0.49 NG/ML (ref 0–0.15)
RBC # BLD: 2.41 M/UL (ref 4–5.2)
SODIUM BLD-SCNC: 144 MMOL/L (ref 136–145)
TOTAL PROTEIN: 4.5 G/DL (ref 6.4–8.2)
WBC # BLD: 12.1 K/UL (ref 4–11)

## 2021-01-17 PROCEDURE — 6360000002 HC RX W HCPCS: Performed by: INTERNAL MEDICINE

## 2021-01-17 PROCEDURE — 6370000000 HC RX 637 (ALT 250 FOR IP): Performed by: INTERNAL MEDICINE

## 2021-01-17 PROCEDURE — 84145 PROCALCITONIN (PCT): CPT

## 2021-01-17 PROCEDURE — 6360000002 HC RX W HCPCS: Performed by: STUDENT IN AN ORGANIZED HEALTH CARE EDUCATION/TRAINING PROGRAM

## 2021-01-17 PROCEDURE — 36415 COLL VENOUS BLD VENIPUNCTURE: CPT

## 2021-01-17 PROCEDURE — 94640 AIRWAY INHALATION TREATMENT: CPT

## 2021-01-17 PROCEDURE — 85018 HEMOGLOBIN: CPT

## 2021-01-17 PROCEDURE — 2580000003 HC RX 258: Performed by: INTERNAL MEDICINE

## 2021-01-17 PROCEDURE — 6370000000 HC RX 637 (ALT 250 FOR IP): Performed by: STUDENT IN AN ORGANIZED HEALTH CARE EDUCATION/TRAINING PROGRAM

## 2021-01-17 PROCEDURE — 6370000000 HC RX 637 (ALT 250 FOR IP): Performed by: FAMILY MEDICINE

## 2021-01-17 PROCEDURE — C9113 INJ PANTOPRAZOLE SODIUM, VIA: HCPCS | Performed by: INTERNAL MEDICINE

## 2021-01-17 PROCEDURE — 80053 COMPREHEN METABOLIC PANEL: CPT

## 2021-01-17 PROCEDURE — 83735 ASSAY OF MAGNESIUM: CPT

## 2021-01-17 PROCEDURE — 85025 COMPLETE CBC W/AUTO DIFF WBC: CPT

## 2021-01-17 PROCEDURE — 2060000000 HC ICU INTERMEDIATE R&B

## 2021-01-17 PROCEDURE — 2700000000 HC OXYGEN THERAPY PER DAY

## 2021-01-17 PROCEDURE — 85014 HEMATOCRIT: CPT

## 2021-01-17 PROCEDURE — 94761 N-INVAS EAR/PLS OXIMETRY MLT: CPT

## 2021-01-17 RX ORDER — FUROSEMIDE 10 MG/ML
20 INJECTION INTRAMUSCULAR; INTRAVENOUS DAILY
Status: DISCONTINUED | OUTPATIENT
Start: 2021-01-17 | End: 2021-01-21

## 2021-01-17 RX ADMIN — STANDARDIZED SENNA CONCENTRATE 8.6 MG: 8.6 TABLET ORAL at 20:32

## 2021-01-17 RX ADMIN — DULOXETINE HYDROCHLORIDE 60 MG: 60 CAPSULE, DELAYED RELEASE ORAL at 08:55

## 2021-01-17 RX ADMIN — PREDNISONE 60 MG: 20 TABLET ORAL at 08:55

## 2021-01-17 RX ADMIN — PANTOPRAZOLE SODIUM 8 MG/HR: 40 INJECTION, POWDER, FOR SOLUTION INTRAVENOUS at 13:58

## 2021-01-17 RX ADMIN — ACETAMINOPHEN 650 MG: 325 TABLET ORAL at 01:14

## 2021-01-17 RX ADMIN — FUROSEMIDE 20 MG: 10 INJECTION, SOLUTION INTRAMUSCULAR; INTRAVENOUS at 13:58

## 2021-01-17 RX ADMIN — CEFEPIME HYDROCHLORIDE 2 G: 2 INJECTION, POWDER, FOR SOLUTION INTRAVENOUS at 14:02

## 2021-01-17 RX ADMIN — ACETAMINOPHEN 650 MG: 325 TABLET ORAL at 13:58

## 2021-01-17 RX ADMIN — INSULIN LISPRO 1 UNITS: 100 INJECTION, SOLUTION INTRAVENOUS; SUBCUTANEOUS at 18:04

## 2021-01-17 RX ADMIN — ANASTROZOLE 1 MG: 1 TABLET ORAL at 09:00

## 2021-01-17 RX ADMIN — SODIUM CHLORIDE, PRESERVATIVE FREE 10 ML: 5 INJECTION INTRAVENOUS at 08:56

## 2021-01-17 RX ADMIN — Medication 10 ML: at 08:56

## 2021-01-17 RX ADMIN — LORAZEPAM 0.5 MG: 0.5 TABLET ORAL at 20:32

## 2021-01-17 RX ADMIN — PANTOPRAZOLE SODIUM 8 MG/HR: 40 INJECTION, POWDER, FOR SOLUTION INTRAVENOUS at 01:02

## 2021-01-17 RX ADMIN — FLUCONAZOLE 100 MG: 100 TABLET ORAL at 08:55

## 2021-01-17 RX ADMIN — ONDANSETRON 4 MG: 2 INJECTION INTRAMUSCULAR; INTRAVENOUS at 20:32

## 2021-01-17 RX ADMIN — Medication 2 PUFF: at 19:58

## 2021-01-17 RX ADMIN — CEFEPIME HYDROCHLORIDE 2 G: 2 INJECTION, POWDER, FOR SOLUTION INTRAVENOUS at 01:01

## 2021-01-17 RX ADMIN — LEVOTHYROXINE SODIUM 75 MCG: 0.07 TABLET ORAL at 05:32

## 2021-01-17 RX ADMIN — ATORVASTATIN CALCIUM 40 MG: 40 TABLET, FILM COATED ORAL at 08:55

## 2021-01-17 RX ADMIN — SODIUM CHLORIDE, PRESERVATIVE FREE 10 ML: 5 INJECTION INTRAVENOUS at 20:33

## 2021-01-17 RX ADMIN — ACETAMINOPHEN 650 MG: 325 TABLET ORAL at 20:32

## 2021-01-17 ASSESSMENT — PAIN DESCRIPTION - FREQUENCY
FREQUENCY: INTERMITTENT
FREQUENCY: INTERMITTENT

## 2021-01-17 ASSESSMENT — PAIN SCALES - GENERAL
PAINLEVEL_OUTOF10: 3
PAINLEVEL_OUTOF10: 3
PAINLEVEL_OUTOF10: 0

## 2021-01-17 ASSESSMENT — PAIN DESCRIPTION - PAIN TYPE
TYPE: ACUTE PAIN
TYPE: ACUTE PAIN

## 2021-01-17 ASSESSMENT — PAIN DESCRIPTION - PROGRESSION: CLINICAL_PROGRESSION: GRADUALLY WORSENING

## 2021-01-17 ASSESSMENT — PAIN DESCRIPTION - DESCRIPTORS: DESCRIPTORS: ACHING;BURNING

## 2021-01-17 ASSESSMENT — PAIN DESCRIPTION - LOCATION: LOCATION: THROAT

## 2021-01-17 NOTE — PROGRESS NOTES
Hospitalist Progress Note      PCP: Mario Bennett MD    Date of Admission: 12/28/2020    Subjective: feeling better today, sitting in recliner    Medications:  Reviewed    Infusion Medications    sodium chloride      pantoprozole (PROTONIX) infusion 8 mg/hr (01/16/21 1310)    sodium chloride      dextrose       Scheduled Medications    sodium chloride (PF)  10 mL Intravenous Daily    sodium chloride flush  10 mL Intravenous 2 times per day    predniSONE  60 mg Oral Daily    fluconazole  100 mg Oral Daily    lidocaine 1 % injection  5 mL Intradermal Once    cefepime  2 g Intravenous Q12H    senna  1 tablet Oral Nightly    polyethylene glycol  17 g Oral Daily    budesonide-formoterol  2 puff Inhalation BID    [Held by provider] enoxaparin  40 mg Subcutaneous BID    [Held by provider] amLODIPine  5 mg Oral Daily    [Held by provider] lisinopril  30 mg Oral Daily    [Held by provider] metoprolol tartrate  100 mg Oral BID    [Held by provider] aspirin  81 mg Oral Daily    levothyroxine  75 mcg Oral Daily    atorvastatin  40 mg Oral Daily    insulin lispro  0-6 Units Subcutaneous TID WC    insulin lispro  0-3 Units Subcutaneous Nightly    anastrozole  1 mg Oral Daily    DULoxetine  60 mg Oral Daily     PRN Meds: albuterol sulfate HFA **AND** ipratropium **AND** MDI Treatment, sodium chloride, sodium chloride flush, LORazepam, sodium chloride, acetaminophen **OR** acetaminophen, glucose, dextrose, glucagon (rDNA), dextrose, ondansetron, guaiFENesin-dextromethorphan, sodium chloride      Intake/Output Summary (Last 24 hours) at 1/16/2021 2245  Last data filed at 1/16/2021 1718  Gross per 24 hour   Intake 960 ml   Output 2200 ml   Net -1240 ml       Physical Exam Performed:    BP (!) 144/77   Pulse 106   Temp 97.8 °F (36.6 °C) (Oral)   Resp 30   Ht 5' 6\" (1.676 m)   Wt 183 lb 13.8 oz (83.4 kg)   SpO2 96%   BMI 29.68 kg/m²     General appearance: fatigued, pallor+  HEENT: Conjunctivae/corneas clear, neck supple w/ full ROM  Respiratory:  Normal respiratory effort. Faint bilateral rales  Cardiovascular: Regular rhythm, tachycardic, normal S1/S2, no murmurs  Abdomen: Soft, non-tender, non-distended with normal bowel sounds. Musculoskeletal: No edema bilaterally  Neurologic:  No new focal deficits  Psychiatric: Alert and oriented,  Capillary Refill: Brisk,< 3 seconds   Peripheral Pulses: +2 palpable, equal bilaterally    Labs:   Recent Labs     01/14/21  0545 01/14/21  0545 01/15/21  0908 01/15/21  0908 01/16/21  1440 01/16/21  1630 01/16/21  2040   WBC 14.0*  --  11.7*  --   --   --   --    HGB 6.5*   < > 7.9*   < > 7.9* 7.8* 7.7*   HCT 20.3*   < > 24.4*   < > 24.3* 23.7* 23.1*     --  182  --   --   --   --     < > = values in this interval not displayed. Recent Labs     01/14/21  0545 01/15/21  0908 01/16/21  0623    141 145   K 3.8 3.4* 3.8   * 112* 112*   CO2 22 22 25   BUN 44* 31* 22*   CREATININE 0.9 0.7 0.7   CALCIUM 7.5* 7.7* 8.0*     Recent Labs     01/14/21  0545 01/15/21  0908 01/16/21  0623   AST 20 19 15   ALT 16 16 16   BILITOT <0.2 <0.2 <0.2   ALKPHOS 56 65 70     Recent Labs     01/14/21  1622   INR 1.11     Recent Labs     01/14/21  0545   TROPONINI <0.01       Urinalysis:      Lab Results   Component Value Date    NITRU Negative 01/10/2021    WBCUA 153 01/10/2021    BACTERIA 4+ 01/10/2021    RBCUA 34 01/10/2021    BLOODU LARGE 01/10/2021    SPECGRAV 1.021 01/10/2021    GLUCOSEU Negative 01/10/2021       Radiology:  XR CHEST PORTABLE   Final Result   Slight interval improvement in appearance of diffuse multifocal airspace   opacities, likely a combination of multifocal pneumonia and superimposed   pulmonary edema. XR CHEST PORTABLE   Final Result   Stable diffuse bilateral lung disease compatible with pneumonia and/or edema         CT CHEST PULMONARY EMBOLISM W CONTRAST   Final Result   No evidence of pulmonary embolism. Mildly dilated and atherosclerotic thoracic aorta with no aneurysm or   dissection. Extensive ground-glass opacities throughout both lungs some and subsegmental   bibasilar opacities which could represent extensive multisegmental   bronchopneumonia and/or pulmonary edema vs pulmonary hemorrhage. Small left pleural effusion. Small lymph nodes scattered in the mediastinum which are not pathologic by   size criteria. Moderate degenerative changes and postop changes in the spine      9 mm hypodensity left lobe liver which could represent a cyst but is   ill-defined. Suggest ultrasound correlation.          XR CHEST PORTABLE   Final Result   Bilateral ill-defined airspace opacities could represent atypical pneumonia,   multifocal bacterial pneumonia or subsegmental atelectasis                 Assessment/Plan:    Active Hospital Problems    Diagnosis    Pneumonia due to COVID-19 virus [U07.1, J12.82]    Hyperglycemia [R73.9]    Leukocytosis [D72.829]    Acute respiratory failure with hypoxia ([facility] [address] [J96.01]    2019 novel coronavirus-infected pneumonia (NCIP) [U07.1, J12.82]    Elevated LDH [R74.02]    Elevated AST (SGOT) [R74.01]    Elevated ferritin [R79.89]    Elevated d-dimer [R79.89]    History of depression [Z86.59]    Essential hypertension [I10]    Class 2 obesity due to excess calories with body mass index (BMI) of 35.0 to 35.9 in adult [E66.09, Z68.35]    Suspected COVID-19 virus infection [Z20.822]    Hypothyroidism [E03.9]    HLD (hyperlipidemia) [E78.5]    PCOS (polycystic ovarian syndrome) [E28.2]    Pneumonia due to organism [J18.9]         COVID 19 PNA - improved  - s/p Increased to 20 mg x 5 days then 10 mg x 5 days  - completed remdesivir day 5/5  - s/p convalescent plasma 12/30/2020  - monitor inflammatory markers  - added Symbicort and continue albuterol/ipatropium  - PLEASE AVOID NARCOTICS     Sepsis 2/2 Klebsiella UTI, Klebsiella bacteremia  Meets at least 2 SIRS Criteria:  Tachypnea > 20  HR > 90  WBC > 12K  Source: urine  -lactic acid q6h  -blood cultures positive for Klebsiella   -repeat blood cultures 1/11  -IV antibiotics: Vanc and cefepime -> cefepime  -IVF: 30 cc/kg given, followed by maintenance ivf  -WBC count and procalcitonin improved  -though she has been on high dose steroids, my concern is with a secondary bacterial process  -CTA Chest with contrast showed extensive ground-glass opacities throughout both lungs, small left pleural effusion, and a 9 mm hypodensity left lobe of the liver that could represent a cyst  -ID consulted, recs appreciated     Acute GI bleed - suspect 2/2 upper source, started on IV PPI gtt, GI consulted. Hold ASA/lovenox, monitor hb, unable to safely perform EGD 2/2 resp status, appr GI recs     Acute blood loss anemia - hb dropped to 6.5, transfused blood and monitor hb Q6H, repeat hb trended down to 6.7, received another unit blood transfusion.  Hb currently staying stable     Liver lesion  - consider CT Ab/Pelvis or abdominal ultrasound inpt vs outpt     Acute respiratory failure with hypoxia -   - improved, now on vapotherm at 30L-->15L-->30L-->improved to 10L-->worsened to 15L-->13L  - consulted pulmonology      HTN - hypotensive   - hold home meds     Hx breast cancer  - c/w arimidex     HLD  - c/w statin     Hypothroidism  - c/w levothyroxine      DM 2  - hold metformin  - ssi, monitor on steroids  - a1c - 6.0     Depression  - c/w home meds     Constipation  - received Dulcolax suppository and enema  - continue Miralax  - add Senna nightly        DVT Prophylaxis: SCD  Diet: DIET CLEAR LIQUID; Carb Control: 4 carb choices (60 gms)/meal  Code Status: Full Code    PT/OT Eval Status: ordered    Dispo - cont care, cont to wean Chavez Trotter MD

## 2021-01-17 NOTE — PLAN OF CARE
Fall risk assessment completed every shift. All precautions in place. Pt has call light within reach at all times. Room clear of clutter. Pt aware to call for assistance when getting up. Skin assessment completed every shift. Pt assessed for incontinence, appropriate barrier cream applied prn. Pt encouraged to turn/rotate every 2 hours. Assistance provided if pt unable to do so themselves.    Encouraging to stand and apply cream every 2 hours

## 2021-01-18 LAB
A/G RATIO: 0.9 (ref 1.1–2.2)
ALBUMIN SERPL-MCNC: 2.4 G/DL (ref 3.4–5)
ALP BLD-CCNC: 71 U/L (ref 40–129)
ALT SERPL-CCNC: 17 U/L (ref 10–40)
ANION GAP SERPL CALCULATED.3IONS-SCNC: 10 MMOL/L (ref 3–16)
AST SERPL-CCNC: 19 U/L (ref 15–37)
ATYPICAL LYMPHOCYTE RELATIVE PERCENT: 2 % (ref 0–6)
BASOPHILIC STIPPLING: ABNORMAL
BASOPHILS ABSOLUTE: 0 K/UL (ref 0–0.2)
BASOPHILS RELATIVE PERCENT: 0 %
BILIRUB SERPL-MCNC: <0.2 MG/DL (ref 0–1)
BUN BLDV-MCNC: 19 MG/DL (ref 7–20)
C-REACTIVE PROTEIN: 14.4 MG/L (ref 0–5.1)
CALCIUM SERPL-MCNC: 8 MG/DL (ref 8.3–10.6)
CHLORIDE BLD-SCNC: 107 MMOL/L (ref 99–110)
CO2: 27 MMOL/L (ref 21–32)
CREAT SERPL-MCNC: 0.8 MG/DL (ref 0.6–1.2)
D DIMER: 3054 NG/ML DDU (ref 0–229)
EOSINOPHILS ABSOLUTE: 0.3 K/UL (ref 0–0.6)
EOSINOPHILS RELATIVE PERCENT: 3 %
GFR AFRICAN AMERICAN: >60
GFR NON-AFRICAN AMERICAN: >60
GLOBULIN: 2.7 G/DL
GLUCOSE BLD-MCNC: 100 MG/DL (ref 70–99)
GLUCOSE BLD-MCNC: 109 MG/DL (ref 70–99)
GLUCOSE BLD-MCNC: 134 MG/DL (ref 70–99)
GLUCOSE BLD-MCNC: 144 MG/DL (ref 70–99)
GLUCOSE BLD-MCNC: 176 MG/DL (ref 70–99)
HCT VFR BLD CALC: 23.2 % (ref 36–48)
HCT VFR BLD CALC: 24.7 % (ref 36–48)
HEMOGLOBIN: 7.7 G/DL (ref 12–16)
HEMOGLOBIN: 8 G/DL (ref 12–16)
LYMPHOCYTES ABSOLUTE: 1.2 K/UL (ref 1–5.1)
LYMPHOCYTES RELATIVE PERCENT: 11 %
MAGNESIUM: 2 MG/DL (ref 1.8–2.4)
MCH RBC QN AUTO: 30.7 PG (ref 26–34)
MCHC RBC AUTO-ENTMCNC: 33.1 G/DL (ref 31–36)
MCV RBC AUTO: 93 FL (ref 80–100)
METAMYELOCYTES RELATIVE PERCENT: 3 %
MONOCYTES ABSOLUTE: 0.6 K/UL (ref 0–1.3)
MONOCYTES RELATIVE PERCENT: 6 %
MYELOCYTE PERCENT: 1 %
NEUTROPHILS ABSOLUTE: 7.3 K/UL (ref 1.7–7.7)
NEUTROPHILS RELATIVE PERCENT: 74 %
NUCLEATED RED BLOOD CELLS: 2 /100 WBC
PDW BLD-RTO: 14.4 % (ref 12.4–15.4)
PERFORMED ON: ABNORMAL
PLATELET # BLD: 189 K/UL (ref 135–450)
PLATELET SLIDE REVIEW: ADEQUATE
PMV BLD AUTO: 8.7 FL (ref 5–10.5)
POLYCHROMASIA: ABNORMAL
POTASSIUM REFLEX MAGNESIUM: 3.2 MMOL/L (ref 3.5–5.1)
PROCALCITONIN: 0.3 NG/ML (ref 0–0.15)
RBC # BLD: 2.5 M/UL (ref 4–5.2)
SLIDE REVIEW: ABNORMAL
SODIUM BLD-SCNC: 144 MMOL/L (ref 136–145)
TOTAL PROTEIN: 5.1 G/DL (ref 6.4–8.2)
VACUOLATED NEUTROPHILS: PRESENT
WBC # BLD: 9.3 K/UL (ref 4–11)

## 2021-01-18 PROCEDURE — 6370000000 HC RX 637 (ALT 250 FOR IP): Performed by: INTERNAL MEDICINE

## 2021-01-18 PROCEDURE — 85379 FIBRIN DEGRADATION QUANT: CPT

## 2021-01-18 PROCEDURE — 6360000002 HC RX W HCPCS: Performed by: INTERNAL MEDICINE

## 2021-01-18 PROCEDURE — 2700000000 HC OXYGEN THERAPY PER DAY

## 2021-01-18 PROCEDURE — 2580000003 HC RX 258: Performed by: INTERNAL MEDICINE

## 2021-01-18 PROCEDURE — 6360000002 HC RX W HCPCS: Performed by: STUDENT IN AN ORGANIZED HEALTH CARE EDUCATION/TRAINING PROGRAM

## 2021-01-18 PROCEDURE — 85014 HEMATOCRIT: CPT

## 2021-01-18 PROCEDURE — 2060000000 HC ICU INTERMEDIATE R&B

## 2021-01-18 PROCEDURE — 85025 COMPLETE CBC W/AUTO DIFF WBC: CPT

## 2021-01-18 PROCEDURE — 6370000000 HC RX 637 (ALT 250 FOR IP): Performed by: STUDENT IN AN ORGANIZED HEALTH CARE EDUCATION/TRAINING PROGRAM

## 2021-01-18 PROCEDURE — 99232 SBSQ HOSP IP/OBS MODERATE 35: CPT | Performed by: INTERNAL MEDICINE

## 2021-01-18 PROCEDURE — 86140 C-REACTIVE PROTEIN: CPT

## 2021-01-18 PROCEDURE — 94761 N-INVAS EAR/PLS OXIMETRY MLT: CPT

## 2021-01-18 PROCEDURE — 84145 PROCALCITONIN (PCT): CPT

## 2021-01-18 PROCEDURE — C9113 INJ PANTOPRAZOLE SODIUM, VIA: HCPCS | Performed by: INTERNAL MEDICINE

## 2021-01-18 PROCEDURE — 6370000000 HC RX 637 (ALT 250 FOR IP): Performed by: FAMILY MEDICINE

## 2021-01-18 PROCEDURE — 36415 COLL VENOUS BLD VENIPUNCTURE: CPT

## 2021-01-18 PROCEDURE — 80053 COMPREHEN METABOLIC PANEL: CPT

## 2021-01-18 PROCEDURE — 85018 HEMOGLOBIN: CPT

## 2021-01-18 PROCEDURE — 83735 ASSAY OF MAGNESIUM: CPT

## 2021-01-18 RX ORDER — PANTOPRAZOLE SODIUM 40 MG/1
40 TABLET, DELAYED RELEASE ORAL
Status: DISCONTINUED | OUTPATIENT
Start: 2021-01-18 | End: 2021-01-23 | Stop reason: HOSPADM

## 2021-01-18 RX ADMIN — LORAZEPAM 0.5 MG: 0.5 TABLET ORAL at 09:19

## 2021-01-18 RX ADMIN — ONDANSETRON 4 MG: 2 INJECTION INTRAMUSCULAR; INTRAVENOUS at 09:19

## 2021-01-18 RX ADMIN — LEVOTHYROXINE SODIUM 75 MCG: 0.07 TABLET ORAL at 06:19

## 2021-01-18 RX ADMIN — ANASTROZOLE 1 MG: 1 TABLET ORAL at 08:12

## 2021-01-18 RX ADMIN — PANTOPRAZOLE SODIUM 8 MG/HR: 40 INJECTION, POWDER, FOR SOLUTION INTRAVENOUS at 05:03

## 2021-01-18 RX ADMIN — AMLODIPINE BESYLATE 5 MG: 5 TABLET ORAL at 08:12

## 2021-01-18 RX ADMIN — ACETAMINOPHEN 650 MG: 325 TABLET ORAL at 03:25

## 2021-01-18 RX ADMIN — Medication 2 PUFF: at 19:48

## 2021-01-18 RX ADMIN — PANTOPRAZOLE SODIUM 40 MG: 40 TABLET, DELAYED RELEASE ORAL at 15:23

## 2021-01-18 RX ADMIN — INSULIN LISPRO 1 UNITS: 100 INJECTION, SOLUTION INTRAVENOUS; SUBCUTANEOUS at 20:15

## 2021-01-18 RX ADMIN — ALTEPLASE 1 MG: 2.2 INJECTION, POWDER, LYOPHILIZED, FOR SOLUTION INTRAVENOUS at 05:03

## 2021-01-18 RX ADMIN — CEFEPIME HYDROCHLORIDE 2 G: 2 INJECTION, POWDER, FOR SOLUTION INTRAVENOUS at 00:58

## 2021-01-18 RX ADMIN — PREDNISONE 60 MG: 20 TABLET ORAL at 08:12

## 2021-01-18 RX ADMIN — INSULIN LISPRO 1 UNITS: 100 INJECTION, SOLUTION INTRAVENOUS; SUBCUTANEOUS at 12:44

## 2021-01-18 RX ADMIN — STANDARDIZED SENNA CONCENTRATE 8.6 MG: 8.6 TABLET ORAL at 19:43

## 2021-01-18 RX ADMIN — ACETAMINOPHEN 650 MG: 325 TABLET ORAL at 10:00

## 2021-01-18 RX ADMIN — ATORVASTATIN CALCIUM 40 MG: 40 TABLET, FILM COATED ORAL at 08:12

## 2021-01-18 RX ADMIN — ACETAMINOPHEN 650 MG: 325 TABLET ORAL at 19:43

## 2021-01-18 RX ADMIN — FUROSEMIDE 20 MG: 10 INJECTION, SOLUTION INTRAMUSCULAR; INTRAVENOUS at 08:12

## 2021-01-18 RX ADMIN — DULOXETINE HYDROCHLORIDE 60 MG: 60 CAPSULE, DELAYED RELEASE ORAL at 08:12

## 2021-01-18 RX ADMIN — SODIUM CHLORIDE, PRESERVATIVE FREE 10 ML: 5 INJECTION INTRAVENOUS at 20:44

## 2021-01-18 RX ADMIN — FLUCONAZOLE 100 MG: 100 TABLET ORAL at 08:12

## 2021-01-18 RX ADMIN — CEFEPIME HYDROCHLORIDE 2 G: 2 INJECTION, POWDER, FOR SOLUTION INTRAVENOUS at 12:44

## 2021-01-18 RX ADMIN — SODIUM CHLORIDE, PRESERVATIVE FREE 10 ML: 5 INJECTION INTRAVENOUS at 08:13

## 2021-01-18 ASSESSMENT — PAIN SCALES - GENERAL
PAINLEVEL_OUTOF10: 4
PAINLEVEL_OUTOF10: 0
PAINLEVEL_OUTOF10: 0

## 2021-01-18 ASSESSMENT — PAIN SCALES - WONG BAKER
WONGBAKER_NUMERICALRESPONSE: 0

## 2021-01-18 ASSESSMENT — PAIN DESCRIPTION - DESCRIPTORS: DESCRIPTORS: SORE

## 2021-01-18 ASSESSMENT — PAIN DESCRIPTION - PROGRESSION: CLINICAL_PROGRESSION: NOT CHANGED

## 2021-01-18 ASSESSMENT — PAIN DESCRIPTION - FREQUENCY: FREQUENCY: INTERMITTENT

## 2021-01-18 ASSESSMENT — PAIN DESCRIPTION - LOCATION: LOCATION: THROAT

## 2021-01-18 NOTE — PROGRESS NOTES
Hospitalist Progress Note      PCP: Devika Martinez MD    Date of Admission: 12/28/2020    Subjective: more awake and alert, feels better today, O2 requirement improved    Medications:  Reviewed    Infusion Medications    sodium chloride      pantoprozole (PROTONIX) infusion 8 mg/hr (01/17/21 1358)    sodium chloride      dextrose       Scheduled Medications    furosemide  20 mg Intravenous Daily    sodium chloride (PF)  10 mL Intravenous Daily    sodium chloride flush  10 mL Intravenous 2 times per day    predniSONE  60 mg Oral Daily    fluconazole  100 mg Oral Daily    lidocaine 1 % injection  5 mL Intradermal Once    cefepime  2 g Intravenous Q12H    senna  1 tablet Oral Nightly    polyethylene glycol  17 g Oral Daily    budesonide-formoterol  2 puff Inhalation BID    [Held by provider] enoxaparin  40 mg Subcutaneous BID    [Held by provider] amLODIPine  5 mg Oral Daily    [Held by provider] lisinopril  30 mg Oral Daily    [Held by provider] metoprolol tartrate  100 mg Oral BID    [Held by provider] aspirin  81 mg Oral Daily    levothyroxine  75 mcg Oral Daily    atorvastatin  40 mg Oral Daily    insulin lispro  0-6 Units Subcutaneous TID WC    insulin lispro  0-3 Units Subcutaneous Nightly    anastrozole  1 mg Oral Daily    DULoxetine  60 mg Oral Daily     PRN Meds: albuterol sulfate HFA **AND** ipratropium **AND** MDI Treatment, sodium chloride, sodium chloride flush, LORazepam, sodium chloride, acetaminophen **OR** acetaminophen, glucose, dextrose, glucagon (rDNA), dextrose, ondansetron, guaiFENesin-dextromethorphan, sodium chloride      Intake/Output Summary (Last 24 hours) at 1/17/2021 2319  Last data filed at 1/17/2021 1703  Gross per 24 hour   Intake 2489.85 ml   Output 1000 ml   Net 1489.85 ml       Physical Exam Performed:    BP (!) 158/88   Pulse 105   Temp 97.9 °F (36.6 °C) (Oral)   Resp 19   Ht 5' 6\" (1.676 m)   Wt 182 lb 5.1 oz (82.7 kg)   SpO2 98%   BMI 29.43 kg/m²     General appearance: fatigued, pallor+  HEENT: Conjunctivae/corneas clear, neck supple w/ full ROM  Respiratory:  Normal respiratory effort. Faint bilateral rales  Cardiovascular: Regular rhythm, tachycardic, normal S1/S2, no murmurs  Abdomen: Soft, non-tender, non-distended with normal bowel sounds. Musculoskeletal: No edema bilaterally  Neurologic:  No new focal deficits  Psychiatric: Alert and oriented,  Capillary Refill: Brisk,< 3 seconds   Peripheral Pulses: +2 palpable, equal bilaterally    Labs:   Recent Labs     01/15/21  0908 01/15/21  0908 01/17/21  0551 01/17/21  0915 01/17/21  2212   WBC 11.7*  --  12.1*  --   --    HGB 7.9*   < > 7.4* 7.4* 7.6*   HCT 24.4*   < > 22.4* 22.8* 22.4*     --  194  --   --     < > = values in this interval not displayed. Recent Labs     01/15/21  0908 01/16/21  0623 01/17/21  0551    145 144   K 3.4* 3.8 3.4*   * 112* 111*   CO2 22 25 27   BUN 31* 22* 17   CREATININE 0.7 0.7 0.7   CALCIUM 7.7* 8.0* 7.9*     Recent Labs     01/15/21  0908 01/16/21  0623 01/17/21  0551   AST 19 15 16   ALT 16 16 16   BILITOT <0.2 <0.2 <0.2   ALKPHOS 65 70 66     No results for input(s): INR in the last 72 hours. No results for input(s): Boyd Scrivener in the last 72 hours. Urinalysis:      Lab Results   Component Value Date    NITRU Negative 01/10/2021    WBCUA 153 01/10/2021    BACTERIA 4+ 01/10/2021    RBCUA 34 01/10/2021    BLOODU LARGE 01/10/2021    SPECGRAV 1.021 01/10/2021    GLUCOSEU Negative 01/10/2021       Radiology:  XR CHEST PORTABLE   Final Result   Slight interval improvement in appearance of diffuse multifocal airspace   opacities, likely a combination of multifocal pneumonia and superimposed   pulmonary edema. XR CHEST PORTABLE   Final Result   Stable diffuse bilateral lung disease compatible with pneumonia and/or edema         CT CHEST PULMONARY EMBOLISM W CONTRAST   Final Result   No evidence of pulmonary embolism.       Mildly dilated and atherosclerotic thoracic aorta with no aneurysm or   dissection. Extensive ground-glass opacities throughout both lungs some and subsegmental   bibasilar opacities which could represent extensive multisegmental   bronchopneumonia and/or pulmonary edema vs pulmonary hemorrhage. Small left pleural effusion. Small lymph nodes scattered in the mediastinum which are not pathologic by   size criteria. Moderate degenerative changes and postop changes in the spine      9 mm hypodensity left lobe liver which could represent a cyst but is   ill-defined. Suggest ultrasound correlation.          XR CHEST PORTABLE   Final Result   Bilateral ill-defined airspace opacities could represent atypical pneumonia,   multifocal bacterial pneumonia or subsegmental atelectasis                 Assessment/Plan:    Active Hospital Problems    Diagnosis    Pneumonia due to COVID-19 virus [U07.1, J12.82]    Hyperglycemia [R73.9]    Leukocytosis [D72.829]    Acute respiratory failure with hypoxia ([facility] [address] [J96.01]    2019 novel coronavirus-infected pneumonia (NCIP) [U07.1, J12.82]    Elevated LDH [R74.02]    Elevated AST (SGOT) [R74.01]    Elevated ferritin [R79.89]    Elevated d-dimer [R79.89]    History of depression [Z86.59]    Essential hypertension [I10]    Class 2 obesity due to excess calories with body mass index (BMI) of 35.0 to 35.9 in adult [E66.09, Z68.35]    Suspected COVID-19 virus infection [Z20.822]    Hypothyroidism [E03.9]    HLD (hyperlipidemia) [E78.5]    PCOS (polycystic ovarian syndrome) [E28.2]    Pneumonia due to organism [J18.9]         COVID 19 PNA - improved  - s/p Increased to 20 mg x 5 days then 10 mg x 5 days  - completed remdesivir day 5/5  - s/p convalescent plasma 12/30/2020  - monitor inflammatory markers  - added Symbicort and continue albuterol/ipatropium  - PLEASE AVOID NARCOTICS     Sepsis 2/2 Klebsiella UTI, Klebsiella bacteremia  Meets at least 2 SIRS Criteria:  Tachypnea > 20  HR > 90  WBC > 12K  Source: urine  -lactic acid q6h  -blood cultures positive for Klebsiella   -repeat blood cultures 1/11  -IV antibiotics: Vanc and cefepime -> cefepime  -IVF: 30 cc/kg given, followed by maintenance ivf  -WBC count and procalcitonin improved  -though she has been on high dose steroids, my concern is with a secondary bacterial process  -CTA Chest with contrast showed extensive ground-glass opacities throughout both lungs, small left pleural effusion, and a 9 mm hypodensity left lobe of the liver that could represent a cyst  -ID consulted, recs appreciated     Acute GI bleed - suspect 2/2 upper source, started on IV PPI gtt, GI consulted. Hold ASA/lovenox, monitor hb, unable to safely perform EGD 2/2 resp status, appr GI recs     Acute blood loss anemia - hb dropped to 6.5, transfused blood and monitor hb Q6H, repeat hb trended down to 6.7, received another unit blood transfusion.  Hb currently staying stable at 7.5     Liver lesion  - consider CT Ab/Pelvis or abdominal ultrasound inpt vs outpt     Acute respiratory failure with hypoxia -   - improved, now on vapotherm at 30L-->15L-->30L-->improved to 10L-->worsened to 15L-->13-->11L  - consulted pulmonology      HTN - fairly controlled  - resume home meds - norvasc     Hx breast cancer  - c/w arimidex     HLD  - c/w statin     Hypothroidism  - c/w levothyroxine      DM 2  - hold metformin  - ssi, monitor on steroids  - a1c - 6.0     Depression  - c/w home meds     Constipation  - received Dulcolax suppository and enema  - continue Miralax  - add Senna nightly        DVT Prophylaxis: SCD  Diet: No diet orders on file  Code Status: Full Code    PT/OT Eval Status: ordered    Dispo - cont care, plan dc home when O2 requirement improved    Shantelle Owusu MD

## 2021-01-18 NOTE — PLAN OF CARE
RN  Outcome: Ongoing     Problem: Risk for Fluid Volume Deficit  Goal: Maintain absence of muscle cramping  1/17/2021 2318 by Maulik Mcfarland RN  Outcome: Ongoing     Problem: Risk for Fluid Volume Deficit  Goal: Maintain normal serum potassium, sodium, calcium, phosphorus, and pH  1/17/2021 2318 by Maulik Mcfarland RN  Outcome: Ongoing     Problem: Loneliness or Risk for Loneliness  Goal: Demonstrate positive use of time alone when socialization is not possible  1/17/2021 231Sarah by Maulik Mcfarland RN  Outcome: Ongoing     Problem: Fatigue  Goal: Verbalize increase energy and improved vitality  1/17/2021 2318 by Maulik Mcfarland RN  Outcome: Ongoing     Problem: Patient Education: Go to Patient Education Activity  Goal: Patient/Family Education  1/17/2021 2318 by Maulik Mcfarland RN  Outcome: Ongoing     Problem: Falls - Risk of:  Goal: Will remain free from falls  Description: Will remain free from falls  1/17/2021 2318 by Maulik Mcfarland RN  Outcome: Ongoing     Problem: Falls - Risk of:  Goal: Absence of physical injury  Description: Absence of physical injury  1/17/2021 2318 by Maulik Mcfarland RN  Outcome: Ongoing     Problem: Nutrition  Goal: Optimal nutrition therapy  1/17/2021 2318 by Maulik Mcfarland RN  Outcome: Ongoing     Problem: Skin Integrity:  Goal: Will show no infection signs and symptoms  Description: Will show no infection signs and symptoms  1/17/2021 2318 by Maulik Mcfarland RN  Outcome: Ongoing     Problem: Skin Integrity:  Goal: Absence of new skin breakdown  Description: Absence of new skin breakdown  1/17/2021 2318 by Maulik Mcfarland RN  Outcome: Ongoing     Problem: Discharge Planning:  Goal: Discharged to appropriate level of care  Description: Discharged to appropriate level of care  1/17/2021 2318 by Maulik Mcfarland RN  Outcome: Ongoing     Problem:  Bowel Function - Altered:  Goal: Bowel elimination is within specified parameters  Description: Bowel elimination is within specified parameters  1/17/2021 2318 by Juan Luis Salinas RN  Outcome: Ongoing     Problem: Fluid Volume - Imbalance:  Goal: Absence of imbalanced fluid volume signs and symptoms  Description: Absence of imbalanced fluid volume signs and symptoms  1/17/2021 2318 by Juan Luis Salinas RN  Outcome: Ongoing

## 2021-01-18 NOTE — PROGRESS NOTES
Infectious Disease Follow up Notes  Admit Date: 12/28/2020  Hospital Day: 22    Antibiotics :   IV Cefepime stop date 1/20    CHIEF COMPLAINT:     COVID 19 PNA  Hypoxic resp failure  WBC elevation  Sepsis  UTI  Bacteremia  GI bleed       Subjective interval History :  63 y.o. woman with a past history of breast cancer, depression, hypertension, obesity, scoliosis, thyroid disease surgical history include hysterectomy breast biopsy back surgery admitted to [facility] [place] on 12/29/2020 with cough shortness of breath diagnosed with COVID-19 pneumonia. She was in acute hypoxic respiratory failure requiring high heated flow oxygenation. She completed a course of IV remdesivir dexamethasone and also received convalescent plasma during this hospitalization. She now developed a WBC elevation with elevated procalcitonin, hence blood culture and urine culture were obtained. Blood cultures from 1/10/2021 no isolated Klebsiella pneumonia as well as urine culture positive for Klebsiella pneumonia. She was started on IV antibiotics given the ongoing sepsis with WBC elevation we are consulted for recommendations. She is still currently receiving 15 L of heated high flow nasal cannula for COVID-19 pneumonia. CT chest from 1/10/2021 still indicates ongoing extensive groundglass opacities in both lung consistent with COVID-19 infection.     Interval History : WBC trending down slowly wean down on oxygen she is currently requiring 9 L through nasal cannula. Tolerating antibiotic therapy okay procalcitonin trending down hemoglobin seems to be stable.       Past Medical History:    Past Medical History:   Diagnosis Date    Arthritis     Breast cancer ([address]     Depression     Hypertension     Obesity     Peptic ulcer disease     Scoliosis     Skin cancer     basal cell    Thyroid disease        Past Surgical History:    Past Surgical History:   Procedure Laterality Date    BACK SURGERY      BREAST BIOPSY      BREAST LUMPECTOMY      CARPAL TUNNEL RELEASE      DILATION AND CURETTAGE OF UTERUS      HYSTERECTOMY      OVARY REMOVAL      THYROID SURGERY         Current Medications:    Outpatient Medications Marked as Taking for the 12/28/20 encounter [facility] Encounter)   Medication Sig Dispense Refill    lisinopril (PRINIVIL;ZESTRIL) 30 MG tablet Take 30 mg by mouth daily      metoprolol succinate (TOPROL XL) 100 MG extended release tablet Take 100 mg by mouth daily      amLODIPine (NORVASC) 5 MG tablet Take 5 mg by mouth daily      dicyclomine (BENTYL) 20 MG tablet Take 20 mg by mouth 3 times daily as needed      tiZANidine (ZANAFLEX) 2 MG tablet Take 4 mg by mouth nightly      anastrozole (ARIMIDEX) 1 MG tablet TAKE 1 TABLET BY MOUTH DAILY 30 tablet 5    alendronate (FOSAMAX) 70 MG tablet Take 1 tablet by mouth every 7 days 4 tablet 3    Fexofenadine HCl (ALLEGRA PO) Take 1 tablet by mouth daily as needed       Calcium Carbonate-Vit D-Min (CALCIUM 1200 PO) Take by mouth Daily       Multiple Vitamins-Minerals (CENTRUM PO) Take by mouth      simvastatin (ZOCOR) 40 MG tablet Take 40 mg by mouth nightly      levothyroxine (LEVOTHROID) 75 MCG tablet Take 75 mcg by mouth Daily      DULoxetine (CYMBALTA) 60 MG extended release capsule Take 60 mg by mouth daily      metFORMIN (GLUCOPHAGE) 500 MG tablet Take 1,000 mg by mouth 2 times daily (with meals)       aspirin 81 MG tablet Take 81 mg by mouth daily      naproxen (NAPROSYN) 375 MG tablet Take 375 mg by mouth as needed       clobetasol (TEMOVATE) 0.05 % ointment Apply topically once a week Apply topically 2 times daily.          Allergies:  [place]    Immunizations :   Immunization History   Administered Date(s) Administered    Pneumococcal Conjugate 13-valent Edwige Byrnes) 11/23/2015       Social History:    Social History     Tobacco Use    Smoking status: Never Smoker    Smokeless tobacco: Never Used   Substance Use Topics    Alcohol use: Not Currently    Drug use: Never     Social History     Tobacco Use   Smoking Status Never Smoker   Smokeless Tobacco Never Used      Family History : no DVT no COPD       REVIEW OF SYSTEMS:     Constitutional:  fevers + , chills, night sweats  Eyes:  negative for blurred vision, eye discharge, visual disturbance   HEENT:  negative for hearing loss, ear drainage,nasal congestion  Respiratory:  cough+ , shortness of breath +  or hemoptysis   Cardiovascular:  negative for chest pain, palpitations, syncope  Gastrointestinal:  negative for nausea, vomiting, diarrhea, constipation, abdominal pain +   Genitourinary:  negative for frequency, dysuria, urinary incontinence, hematuria  Hematologic/Lymphatic:  negative for easy bruising, bleeding and lymphadenopathy  Allergic/Immunologic:  negative for recurrent infections, angioedema, anaphylaxis   Endocrine:  negative for weight changes, polyuria, polydipsia and polyphagia  Musculoskeletal:  negative for joint  pain, swelling, decreased range of motion  Integumentary: No rashes, skin lesions  Neurological:  negative for headaches, slurred speech, unilateral weakness  Psychiatric: negative for hallucinations,confusion,agitation. PHYSICAL EXAM:      Vitals:    /81   Pulse 92   Temp 98 °F (36.7 °C) (Oral)   Resp 18   Ht 5' 6\" (1.676 m)   Wt 183 lb 6.8 oz (83.2 kg)   SpO2 94%   BMI 29.61 kg/m²     PHYSICAL EXAM:   PHYSICAL EXAM:     In-person bedside physical examination deferred. Pursuant to the emergency declaration under the [address] [place] [address] and the [employer] Act, this clinical encounter was conducted to provide necessary medical care.    (Also consistent with new provisions and guidance offered by [facility] on March 18, 2020 in setting of COVID 19 outbreak and in order to minimize exposures in accordance with the flexibilities announced by CMS on March 30, 2020). In person physical examination was not conducted at bedside in this patient in [facility]-19 isolation room to avoid unnecessary exposures, as it will not change my management plan for this patient. References: https://[facility]. [facility]/Portals/0/Resources/COVID-19/3_18%20Telemed%20Guidance%20Updated%20March%2018. pdf?[email or web address]                      https://[facility]. [facility]/Portals/0/Resources/COVID-19/3_18%20Telemed%20Guidance%20Updated%20March%2018. pdf?[email or web address]                      [email or web address] pdf                              Pulmonary: deferred  Abdomen/GI: deferred  Neuro: deferred  Skin: deferred  Musculoskeletal:  deferred  Genitourinary: Deferred  Psych: deferred  Lymphatic/Immunologic: deferred          Data Review:    CBC:   Lab Results   Component Value Date    WBC 9.3 01/18/2021    HGB 7.7 (L) 01/18/2021    HCT 23.2 (L) 01/18/2021    MCV 93.0 01/18/2021     01/18/2021     RENAL:   Lab Results   Component Value Date    CREATININE 0.8 01/18/2021    BUN 19 01/18/2021     01/18/2021    K 3.2 (L) 01/18/2021     01/18/2021    CO2 27 01/18/2021     SED RATE: No results found for: SEDRATE  CK: No results found for: CKTOTAL  CRP:   Lab Results   Component Value Date    CRP 14.4 01/18/2021     Hepatic Function Panel:   Lab Results   Component Value Date    ALKPHOS 71 01/18/2021    ALT 17 01/18/2021    AST 19 01/18/2021    PROT 5.1 01/18/2021    BILITOT <0.2 01/18/2021    LABALBU 2.4 01/18/2021     UA:  Lab Results   Component Value Date    COLORU DK YELLOW 01/10/2021    CLARITYU TURBID 01/10/2021    GLUCOSEU Negative 01/10/2021    BILIRUBINUR SMALL 01/10/2021    KETUA Negative 01/10/2021    SPECGRAV 1.021 01/10/2021    BLOODU LARGE 01/10/2021    PHUR 5.5 01/10/2021    PROTEINU 100 01/10/2021    UROBILINOGEN 0.2 01/10/2021    NITRU Negative 01/10/2021    LEUKOCYTESUR MODERATE 01/10/2021    LABMICR YES 01/10/2021    URINETYPE NotGiven 01/10/2021      Urine Microscopic:   Lab Results   Component Value Date    BACTERIA 4+ 01/10/2021    COMU see below 01/10/2021    WBCUA 153 01/10/2021    RBCUA 34 01/10/2021    EPIU 1 01/10/2021     Urine Reflex to Culture: No results found for: URRFLXCULT    Procal  3.81     CRP  36.6     laCTIC acid  3.7     MICRO: cultures reviewed and updated by me   Blood Culture:          Culture, Urine [[id number]] (Abnormal)  Collected: 01/10/21 1130   Order Status: Completed Specimen: Urine, clean catch Updated: 01/12/21 0758    Organism Klebsiella pneumoniaeAbnormal     Urine Culture, Routine >100,000 CFU/ml   Narrative:     ORDER#: [id number]                          ORDERED BY: Jeff Monday   SOURCE: Urine Clean Catch                  COLLECTED:  01/10/21 11:30   ANTIBIOTICS AT [facility] :                      RECEIVED :  01/10/21 11:49   Performed at:   [address] [address]   Phone [phone]   Culture, Blood 2 [[id number]] (Abnormal) Collected: 01/10/21 1541   Order Status: Completed Specimen: Blood Updated: 01/12/21 0725    Culture, Blood 2 --Abnormal     Gram stain Aerobic bottle:   Gram negative rods   Information to follow   Gram stain Anaerobic bottle:   Gram negative rods   Information to follow   Abnormal     Organism Klebsiella pneumoniaeAbnormal     Culture, Blood 2 --    POSITIVE for   No further workup   Refer to culture [id number] for sensitivity results    Isolated two of two sets    Narrative:     ORDER#: [id number]                          ORDERED BY: Jeff Monday   SOURCE: Blood                              COLLECTED:  01/10/21 15:41   ANTIBIOTICS AT [facility] :                      RECEIVED :  01/10/21 15:41   CALL  [facility]  [facility] tel. [phone],   Previous panic on this admission - call not needed per SOP, 01/11/2021 07:03,   by Hilary Bishop   If child <=2 yrs old please draw pediatric bottle. ~Blood Culture #2   Performed at:   [facility]   [address] [facility] [address]   Phone [phone]   Culture, Blood 1 [[id number]] (Abnormal) Collected: 01/10/21 1310   Order Status: Completed Specimen: Blood Updated: 01/12/21 0724    Blood Culture, Routine --Abnormal     Gram stain Aerobic bottle:   Gram negative rods   Information to follow   Gram stain Anaerobic bottle:   Gram negative rods   Information to follow   Abnormal     Organism Klebsiella pneumoniae DNA DetectedAbnormal     Blood Culture, Routine See additional report for complete BCID panel. Organism Klebsiella pneumoniaeAbnormal     Blood Culture, Routine --    POSITIVE for   Sensitivity to follow    Isolated two of two sets    Narrative:     ORDER#: [id number]                          ORDERED BY: Allen Morrissey   SOURCE: Blood                              COLLECTED:  01/10/21 13:10   ANTIBIOTICS AT [facility] :                      RECEIVED :  01/10/21 13:17   [facility] [phone], [phone]   Microbiology results called to and read back by pharmacist Avery Barber,   01/11/2021 07:07, by Hilary Bishop   Microbiology results called to and read back by RN Junior Martines, 01/11/2021   07:07, by Hilary Bishop   If child <=2 yrs old please draw pediatric bottle. ~Blood Culture 1   Performed at:   [facility]   [address] [facility] [address]   Phone [phone]   Culture, Blood 1 [[id number]]    Order Status: Sent Specimen: Blood    Culture, Blood 2 [[id number]]    Order Status: Sent Specimen: Blood    MRSA DNA Probe, Nasal [[id number]] Collected: 01/10/21 1400   Order Status: Completed Specimen: Nares Updated: 01/11/21 2251    MRSA SCREEN RT-PCR --    Negative  MRSA DNA not detected.    Normal Range: Not detected    Narrative:     ORDER#: [id number]                          ORDERED BY: Allen Morrissey   SOURCE: Nares                              COLLECTED:  01/10/21 14:00   ANTIBIOTICS AT [facility] :                      RECEIVED :  01/10/21 14:03   Performed at:   [facility]   [address] [facility] [address] [place] [address]   Phone [phone]   Strep Pneumoniae Antigen [[id number]] Collected: 01/10/21 1130   Order Status: Completed Specimen: Urine, clean catch Updated: 01/11/21 1347    STREP PNEUMONIAE ANTIGEN, URINE --    Presumptive Negative   Presumptive negative suggests no current or recent   pneumococcal infection. Infection due to Strep pneumoniae   cannot be ruled out since the antigen present in the sample   may be below the detection limit of the test.   Normal Range:Presumptive Negative    Narrative:     ORDER#: [id number]                          ORDERED BY: Valentino Dubois   SOURCE: Urine Clean Catch                  COLLECTED:  01/10/21 11:30   ANTIBIOTICS AT [facility] :                      RECEIVED :  01/10/21 11:49   Performed at:   [facility]   [address] [facility] [place] [place], [place] [address]   Phone [phone]   Legionella Antigen, Urine [[id number]] Collected: 01/10/21 1130   Order Status: Completed Specimen: Urine, clean catch Updated: 01/11/21 1346    L. pneumophila Serogp 1 Ur Ag --    Presumptive Negative   No Legionella pneumophila serogroup 1 antigens detected. A negative result does not exclude infection with   Legionella pneumophila serogroup 1 nor does it rule out   other microbial-caused respiratory infections or   disease caused by other serogroups of   Legionella pneumophila. Normal Range: Presumptive Negative    Narrative:     ORDER#: [id number]                          ORDERED BY: Valentino Dubois   SOURCE: Urine Clean Catch                  COLLECTED:  01/10/21 11:30   ANTIBIOTICS AT [facility] :                      RECEIVED :  01/10/21 11:49   Performed at:   [facility]   [address] [place] [address]   Phone [phone]   Culture, Blood, PCR ID Panel Results Report [[id number]] Collected: 01/10/21 1310   Order Status: Completed Updated: 01/11/21 0709    Report SEE IMAGE   Narrative:     CALL Sangeetha Houston [phone],  Alma Cifuentes   Microbiology results called to and read back by pharmacist Gianfranco Hickey,   01/11/2021 07:07, by [facility]   Microbiology results called to and read back by RN Hector Nieves, 01/11/2021   07:07, by [facility]   Referred out by:   [facility]   [address] [place], [place] [address]   Phone [phone]   Culture, Respiratory [[id number]]    Order Status: No result Specimen: Sputum Expectorated    Culture, Blood 1 [[id number]] Collected: 12/28/20 2140   Order Status: Completed Specimen: Blood Updated: 01/02/21 1415    Blood Culture, Routine No Growth after 4 days of incubation. Narrative:     ORDER#: [id number]                          ORDERED BY: Edil Post   SOURCE: Blood Antecubital-Lef              COLLECTED:  12/28/20 21:40   ANTIBIOTICS AT [facility] :                      RECEIVED :  12/29/20 13:55   If child <=2 yrs old please draw pediatric bottle. ~Blood Culture #1   Performed at:   [facility]   [address] [place] [facility] [place] [address]   Phone [phone]   Culture, Blood 2 [[id number]] Collected: 12/28/20 2215   Order Status: Completed Specimen: Blood Updated: 01/02/21 1415    Culture, Blood 2 No Growth after 4 days of incubation. Narrative:     ORDER#: [id number]                          ORDERED BY: Edil Post   SOURCE: Blood Antecubital-Rig              COLLECTED:  12/28/20 22:15   ANTIBIOTICS AT [facility] :                      RECEIVED :  12/29/20 13:55   If child <=2 yrs old please draw pediatric bottle. ~Blood Culture #2   Performed at:   [facility]   [address] [place], [place] [address]   Phone [phone]     Klebsiella pneumoniae (1)    Antibiotic 9 mm hypodensity left lobe liver which could represent a cyst but is   ill-defined. Suggest ultrasound correlation.          XR CHEST PORTABLE   Final Result   Bilateral ill-defined airspace opacities could represent atypical pneumonia,   multifocal bacterial pneumonia or subsegmental atelectasis               All the pertinent images and reports for the current Hospitalization were reviewed by me     Scheduled Meds:   furosemide  20 mg Intravenous Daily    sodium chloride (PF)  10 mL Intravenous Daily    sodium chloride flush  10 mL Intravenous 2 times per day    predniSONE  60 mg Oral Daily    lidocaine 1 % injection  5 mL Intradermal Once    cefepime  2 g Intravenous Q12H    senna  1 tablet Oral Nightly    polyethylene glycol  17 g Oral Daily    budesonide-formoterol  2 puff Inhalation BID    [Held by provider] enoxaparin  40 mg Subcutaneous BID    amLODIPine  5 mg Oral Daily    [Held by provider] lisinopril  30 mg Oral Daily    [Held by provider] metoprolol tartrate  100 mg Oral BID    [Held by provider] aspirin  81 mg Oral Daily    levothyroxine  75 mcg Oral Daily    atorvastatin  40 mg Oral Daily    insulin lispro  0-6 Units Subcutaneous TID WC    insulin lispro  0-3 Units Subcutaneous Nightly    anastrozole  1 mg Oral Daily    DULoxetine  60 mg Oral Daily       Continuous Infusions:   sodium chloride      pantoprozole (PROTONIX) infusion 8 mg/hr (01/18/21 0507)    sodium chloride      dextrose         PRN Meds:  albuterol sulfate HFA **AND** ipratropium **AND** MDI Treatment, sodium chloride, sodium chloride flush, LORazepam, sodium chloride, acetaminophen **OR** acetaminophen, glucose, dextrose, glucagon (rDNA), dextrose, ondansetron, guaiFENesin-dextromethorphan, sodium chloride      Assessment:     Patient Active Problem List   Diagnosis    Malignant neoplasm of central portion of right female breast ([facility] [address]    Encounter for consultation    Encounter for antineoplastic radiation therapy    ER+ (estrogen receptor positive status)    HER2-negative carcinoma of breast (HCC)    Use of anastrozole (Arimidex)    Visit for monitoring Arimidex therapy    Osteopenia    Pneumonia due to organism    Suspected COVID-19 virus infection    Hypothyroidism    HLD (hyperlipidemia)    PCOS (polycystic ovarian syndrome)    Acute respiratory failure with hypoxia (HCC)    2019 novel coronavirus-infected pneumonia (NCIP)    Elevated LDH    Elevated AST (SGOT)    Elevated ferritin    Elevated d-dimer    History of depression    Essential hypertension    Class 2 obesity due to excess calories with body mass index (BMI) of 35.0 to 35.9 in adult    Pneumonia due to COVID-19 virus    Hyperglycemia    Leukocytosis       Sepsis resolved  Klebsiella pneumonia urinary tract infection  Urinalysis very abnormal from 1/10/2021  Klebsiella pneumoniae bacteremia  WBC elevation trending down  COVID-19 pneumonia  Acute hypoxic respiratory failure improving  CT chest with bilateral multifocal pneumonia  Elevated D-dimer elevated ferritin  History of breast cancer  History of diabetes  BMI 29.36  Elevated procalcitonin trending down  Lactic acidosis resolved   elevated CRP   Gi bleed      She continues to require high flow oxygen supplementation for acute hypoxic respiratory failure from COVID-19 pneumonia. Unfortunately she now developed sepsis secondary to urinary tract infection with Klebsiella in the urine as well as in the bloodstream    .  Agree with IV antibiotics at this time. She completed a course of remdesivir dexamethasone and plasma for her COVID-19 pneumonia. Unfortunately  CT scan of the chest with  still ongoing changes secondary to COVID-19. Procalcitonin slowly trending down CRP improving on antibiotic therapy. Follow blood cultures in process. Unfortunately she did have a drop in hemoglobin with suspected GI bleed.   She is requiring blood transfusion also lactic acid was elevated now improving  . GI has been consulted. Anticoagulation currently on hold      Able to wean down oxygen supplementation. WBC trending down procalcitonin improving. Follow-up blood cultures remain negative will define the course on IV cefepime. Labs, Microbiology, Radiology and all the pertinent results from current hospitalization and  care every where were reviewed  by me as a part of the evaluation   Plan:   1. Cont IV Cefepime x 2 gm q 12 hrs will cover Bacteremia, UTI and PNA stop date 1/20/21  2. Klebsiella sensitivities noted   3. Repeat Blood cx  on 1/13 NGTD  4. Cont supportive care  5. She completed the Treatment course for COVID and now on supportive care with Oxygen supplementation improving    6. CRP TRENDING down , D dimer elevation now likely from sepsis and COVID 19    7. On steroids per Pulmonary may need to wean   8. Has oral thrush - Fluconaozle oral x stop 1/19  9. GI bleed s/p PRBC and anticoagulation on hold    Will sign off call with Questions       Discussed with patient/Family and Nursing staff     Thanks for allowing me to participate in your patient's care and please call me with any questions or concerns.     Ilan Marcos MD  Infectious Disease  [facility]) Physician  Phone: [phone]   Fax : [phone]

## 2021-01-18 NOTE — PROGRESS NOTES
[facility]    Patient without abdominal pain, N/V  Now having brown stools  Hgb stable at 8.0  The patient is still requiring high-flow O2 supp    REC:  Advance diet  PO pantoprazole  CBC daily  Given the need for high-flow O2, the patient will not be cleared by the Anesthesiology service anytime soon for an EGD  It does appear that the bleeding has stopped

## 2021-01-18 NOTE — CARE COORDINATION
No answer in patient room. Plan was home at discharge. Messaged Dr Pam Cain to see if LTACH an option. She responded saying patient wants to go home when she is ready for discharge. Will continue to monitor for discharge needs. Patient remains on 10L O2.  Electronically signed by Clinton Abarca RN Case Management [phone] on 1/18/2021 at 3:34 PM

## 2021-01-18 NOTE — PLAN OF CARE
Problem: Nutrition  Goal: Optimal nutrition therapy  Outcome: Ongoing     Nutrition Problem #1: Inadequate oral intake  Intervention: Food and/or Nutrient Delivery: Continue Current Diet, Start Oral Nutrition Supplement  Nutritional Goals: Meal and supplement intake greater than 50%

## 2021-01-18 NOTE — PROGRESS NOTES
Patient requesting Zofran for nausea and ativan for anxiety at this time [facility]

## 2021-01-18 NOTE — PROGRESS NOTES
Comprehensive Nutrition Assessment    Type and Reason for Visit:  Reassess    Nutrition Recommendations/Plan:     -Continue clear liquid; carb control diet, with advancement when medically indicated  -Trial Ensure Clear once daily  -Consider need for alternative nutrition therapy, if unable to advance diet soon    Nutrition Assessment:  Follow-up. Pt remains in isolation for COVID+. Plan for possible EGD today after seen by GI this morning. Pt still having black BMs. Pt currently on a clear liquid; carb control diet with good intakes documented. Will trial Ensure Clear once daily to help with nutritional status. Pt has been NPO/clear liquid for 4 days now. If cannot advance diet soon, may need to consider alternative nutrition therapy. Will continue to monitor nutritional status and for nutrition plan of care. Malnutrition Assessment:  Malnutrition Status: At risk for malnutrition (Comment)      Due to current CDC guidelines recommending 6-ft distancing for social isolation for COVID19 prevention, NFPE/malnutrition assessment was deferred at this time. Estimated Daily Nutrient Needs:  Energy (kcal):  3873-9447 (15-18kcals/88kg); Weight Used for Energy Requirements:  Admission     Protein (g):   (1.2-2.0g/59kg);  Weight Used for Protein Requirements:  Ideal        Fluid (ml/day):   ; Method Used for Fluid Requirements:  1 ml/kcal      Nutrition Related Findings:  loose black BM 1/18; trace BLE; glucose elevated at 144, K low at 3.2      Wounds:  Open Wounds, Moisture Associate Skin Damage       Current Nutrition Therapies:    DIET CLEAR LIQUID; Carb Control: 4 carb choices (60 gms)/meal    Anthropometric Measures:  · Height: 5' 6\" (167.6 cm)  · Current Body Weight: 183 lb (83 kg)   · Admission Body Weight: 193 lb (87.5 kg)    · Usual Body Weight: 215 lb (97.5 kg)(Since admission)     · Ideal Body Weight: 130 lbs; % Ideal Body Weight 140.8 %   · BMI: 29.6  · Adjusted Body Weight:  ; No Adjustment

## 2021-01-19 LAB
A/G RATIO: 1 (ref 1.1–2.2)
ALBUMIN SERPL-MCNC: 2.4 G/DL (ref 3.4–5)
ALP BLD-CCNC: 64 U/L (ref 40–129)
ALT SERPL-CCNC: 16 U/L (ref 10–40)
ANION GAP SERPL CALCULATED.3IONS-SCNC: 9 MMOL/L (ref 3–16)
AST SERPL-CCNC: 16 U/L (ref 15–37)
BASOPHILS ABSOLUTE: 0 K/UL (ref 0–0.2)
BASOPHILS RELATIVE PERCENT: 0.4 %
BILIRUB SERPL-MCNC: <0.2 MG/DL (ref 0–1)
BUN BLDV-MCNC: 21 MG/DL (ref 7–20)
CALCIUM SERPL-MCNC: 8.1 MG/DL (ref 8.3–10.6)
CHLORIDE BLD-SCNC: 105 MMOL/L (ref 99–110)
CO2: 29 MMOL/L (ref 21–32)
CREAT SERPL-MCNC: 0.8 MG/DL (ref 0.6–1.2)
EOSINOPHILS ABSOLUTE: 0.2 K/UL (ref 0–0.6)
EOSINOPHILS RELATIVE PERCENT: 1.5 %
GFR AFRICAN AMERICAN: >60
GFR NON-AFRICAN AMERICAN: >60
GLOBULIN: 2.4 G/DL
GLUCOSE BLD-MCNC: 118 MG/DL (ref 70–99)
GLUCOSE BLD-MCNC: 131 MG/DL (ref 70–99)
GLUCOSE BLD-MCNC: 186 MG/DL (ref 70–99)
GLUCOSE BLD-MCNC: 195 MG/DL (ref 70–99)
GLUCOSE BLD-MCNC: 98 MG/DL (ref 70–99)
HCT VFR BLD CALC: 22.2 % (ref 36–48)
HEMOGLOBIN: 7.5 G/DL (ref 12–16)
LYMPHOCYTES ABSOLUTE: 1.5 K/UL (ref 1–5.1)
LYMPHOCYTES RELATIVE PERCENT: 14.1 %
MAGNESIUM: 1.9 MG/DL (ref 1.8–2.4)
MAGNESIUM: 2 MG/DL (ref 1.8–2.4)
MCH RBC QN AUTO: 31.4 PG (ref 26–34)
MCHC RBC AUTO-ENTMCNC: 33.8 G/DL (ref 31–36)
MCV RBC AUTO: 93 FL (ref 80–100)
MONOCYTES ABSOLUTE: 0.7 K/UL (ref 0–1.3)
MONOCYTES RELATIVE PERCENT: 6.8 %
NEUTROPHILS ABSOLUTE: 8.2 K/UL (ref 1.7–7.7)
NEUTROPHILS RELATIVE PERCENT: 77.2 %
PDW BLD-RTO: 14 % (ref 12.4–15.4)
PERFORMED ON: ABNORMAL
PLATELET # BLD: 195 K/UL (ref 135–450)
PMV BLD AUTO: 8.7 FL (ref 5–10.5)
POTASSIUM REFLEX MAGNESIUM: 3 MMOL/L (ref 3.5–5.1)
POTASSIUM SERPL-SCNC: 3.6 MMOL/L (ref 3.5–5.1)
PROCALCITONIN: 0.23 NG/ML (ref 0–0.15)
RBC # BLD: 2.39 M/UL (ref 4–5.2)
SODIUM BLD-SCNC: 143 MMOL/L (ref 136–145)
TOTAL PROTEIN: 4.8 G/DL (ref 6.4–8.2)
WBC # BLD: 10.7 K/UL (ref 4–11)

## 2021-01-19 PROCEDURE — 6370000000 HC RX 637 (ALT 250 FOR IP): Performed by: FAMILY MEDICINE

## 2021-01-19 PROCEDURE — 6370000000 HC RX 637 (ALT 250 FOR IP): Performed by: INTERNAL MEDICINE

## 2021-01-19 PROCEDURE — 36415 COLL VENOUS BLD VENIPUNCTURE: CPT

## 2021-01-19 PROCEDURE — 6360000002 HC RX W HCPCS: Performed by: INTERNAL MEDICINE

## 2021-01-19 PROCEDURE — 84132 ASSAY OF SERUM POTASSIUM: CPT

## 2021-01-19 PROCEDURE — 2060000000 HC ICU INTERMEDIATE R&B

## 2021-01-19 PROCEDURE — 80053 COMPREHEN METABOLIC PANEL: CPT

## 2021-01-19 PROCEDURE — 6370000000 HC RX 637 (ALT 250 FOR IP): Performed by: STUDENT IN AN ORGANIZED HEALTH CARE EDUCATION/TRAINING PROGRAM

## 2021-01-19 PROCEDURE — 83735 ASSAY OF MAGNESIUM: CPT

## 2021-01-19 PROCEDURE — 84145 PROCALCITONIN (PCT): CPT

## 2021-01-19 PROCEDURE — 94761 N-INVAS EAR/PLS OXIMETRY MLT: CPT

## 2021-01-19 PROCEDURE — 85025 COMPLETE CBC W/AUTO DIFF WBC: CPT

## 2021-01-19 PROCEDURE — 2700000000 HC OXYGEN THERAPY PER DAY

## 2021-01-19 PROCEDURE — 2580000003 HC RX 258: Performed by: INTERNAL MEDICINE

## 2021-01-19 RX ORDER — PREDNISONE 20 MG/1
40 TABLET ORAL DAILY
Status: DISCONTINUED | OUTPATIENT
Start: 2021-01-20 | End: 2021-01-23 | Stop reason: HOSPADM

## 2021-01-19 RX ORDER — POTASSIUM CHLORIDE 750 MG/1
40 TABLET, FILM COATED, EXTENDED RELEASE ORAL ONCE
Status: COMPLETED | OUTPATIENT
Start: 2021-01-19 | End: 2021-01-19

## 2021-01-19 RX ADMIN — Medication 2 PUFF: at 12:32

## 2021-01-19 RX ADMIN — INSULIN LISPRO 1 UNITS: 100 INJECTION, SOLUTION INTRAVENOUS; SUBCUTANEOUS at 17:00

## 2021-01-19 RX ADMIN — ATORVASTATIN CALCIUM 40 MG: 40 TABLET, FILM COATED ORAL at 08:42

## 2021-01-19 RX ADMIN — CEFEPIME HYDROCHLORIDE 2 G: 2 INJECTION, POWDER, FOR SOLUTION INTRAVENOUS at 12:34

## 2021-01-19 RX ADMIN — ACETAMINOPHEN 650 MG: 325 TABLET ORAL at 08:41

## 2021-01-19 RX ADMIN — PANTOPRAZOLE SODIUM 40 MG: 40 TABLET, DELAYED RELEASE ORAL at 06:17

## 2021-01-19 RX ADMIN — PREDNISONE 60 MG: 20 TABLET ORAL at 08:42

## 2021-01-19 RX ADMIN — Medication 2 PUFF: at 21:35

## 2021-01-19 RX ADMIN — STANDARDIZED SENNA CONCENTRATE 8.6 MG: 8.6 TABLET ORAL at 21:11

## 2021-01-19 RX ADMIN — SODIUM CHLORIDE, PRESERVATIVE FREE 10 ML: 5 INJECTION INTRAVENOUS at 08:43

## 2021-01-19 RX ADMIN — INSULIN LISPRO 1 UNITS: 100 INJECTION, SOLUTION INTRAVENOUS; SUBCUTANEOUS at 21:11

## 2021-01-19 RX ADMIN — ANASTROZOLE 1 MG: 1 TABLET ORAL at 12:34

## 2021-01-19 RX ADMIN — ACETAMINOPHEN 650 MG: 325 TABLET ORAL at 01:12

## 2021-01-19 RX ADMIN — ACETAMINOPHEN 650 MG: 325 TABLET ORAL at 18:24

## 2021-01-19 RX ADMIN — LEVOTHYROXINE SODIUM 75 MCG: 0.07 TABLET ORAL at 06:17

## 2021-01-19 RX ADMIN — POTASSIUM CHLORIDE 40 MEQ: 750 TABLET, FILM COATED, EXTENDED RELEASE ORAL at 12:34

## 2021-01-19 RX ADMIN — CEFEPIME HYDROCHLORIDE 2 G: 2 INJECTION, POWDER, FOR SOLUTION INTRAVENOUS at 01:13

## 2021-01-19 RX ADMIN — AMLODIPINE BESYLATE 5 MG: 5 TABLET ORAL at 08:42

## 2021-01-19 RX ADMIN — DULOXETINE HYDROCHLORIDE 60 MG: 60 CAPSULE, DELAYED RELEASE ORAL at 08:42

## 2021-01-19 RX ADMIN — Medication 10 ML: at 08:49

## 2021-01-19 RX ADMIN — PANTOPRAZOLE SODIUM 40 MG: 40 TABLET, DELAYED RELEASE ORAL at 18:19

## 2021-01-19 RX ADMIN — SODIUM CHLORIDE, PRESERVATIVE FREE 10 ML: 5 INJECTION INTRAVENOUS at 21:12

## 2021-01-19 RX ADMIN — FUROSEMIDE 20 MG: 10 INJECTION, SOLUTION INTRAMUSCULAR; INTRAVENOUS at 08:42

## 2021-01-19 ASSESSMENT — PAIN DESCRIPTION - PAIN TYPE
TYPE: ACUTE PAIN
TYPE: ACUTE PAIN

## 2021-01-19 ASSESSMENT — PAIN SCALES - WONG BAKER
WONGBAKER_NUMERICALRESPONSE: 0

## 2021-01-19 ASSESSMENT — PAIN SCALES - GENERAL
PAINLEVEL_OUTOF10: 3
PAINLEVEL_OUTOF10: 0
PAINLEVEL_OUTOF10: 5
PAINLEVEL_OUTOF10: 0

## 2021-01-19 ASSESSMENT — PAIN DESCRIPTION - ONSET
ONSET: ON-GOING
ONSET: ON-GOING

## 2021-01-19 ASSESSMENT — PAIN DESCRIPTION - FREQUENCY: FREQUENCY: INTERMITTENT

## 2021-01-19 NOTE — PROGRESS NOTES
Hospitalist Progress Note      PCP: Geoff Morrow MD    Date of Admission: 12/28/2020    Subjective: feels better today, +BM, denies any blood in stools    Medications:  Reviewed    Infusion Medications    sodium chloride      sodium chloride      dextrose       Scheduled Medications    pantoprazole  40 mg Oral BID AC    furosemide  20 mg Intravenous Daily    sodium chloride (PF)  10 mL Intravenous Daily    sodium chloride flush  10 mL Intravenous 2 times per day    predniSONE  60 mg Oral Daily    lidocaine 1 % injection  5 mL Intradermal Once    cefepime  2 g Intravenous Q12H    senna  1 tablet Oral Nightly    polyethylene glycol  17 g Oral Daily    budesonide-formoterol  2 puff Inhalation BID    [Held by provider] enoxaparin  40 mg Subcutaneous BID    amLODIPine  5 mg Oral Daily    [Held by provider] lisinopril  30 mg Oral Daily    [Held by provider] metoprolol tartrate  100 mg Oral BID    [Held by provider] aspirin  81 mg Oral Daily    levothyroxine  75 mcg Oral Daily    atorvastatin  40 mg Oral Daily    insulin lispro  0-6 Units Subcutaneous TID WC    insulin lispro  0-3 Units Subcutaneous Nightly    anastrozole  1 mg Oral Daily    DULoxetine  60 mg Oral Daily     PRN Meds: albuterol sulfate HFA **AND** ipratropium **AND** MDI Treatment, sodium chloride, sodium chloride flush, LORazepam, sodium chloride, acetaminophen **OR** acetaminophen, glucose, dextrose, glucagon (rDNA), dextrose, ondansetron, guaiFENesin-dextromethorphan, sodium chloride      Intake/Output Summary (Last 24 hours) at 1/19/2021 0054  Last data filed at 1/19/2021 0034  Gross per 24 hour   Intake 2721 ml   Output 800 ml   Net 1921 ml       Physical Exam Performed:    /78   Pulse 92   Temp 97.8 °F (36.6 °C) (Oral)   Resp 20   Ht 5' 6\" (1.676 m)   Wt 183 lb 6.8 oz (83.2 kg)   SpO2 100%   BMI 29.61 kg/m²     General appearance: fatigued, pallor+  HEENT: Conjunctivae/corneas clear, neck supple w/ full ROM  Respiratory:  Normal respiratory effort. Faint bilateral rales  Cardiovascular: Regular rhythm, tachycardic, normal S1/S2, no murmurs  Abdomen: Soft, non-tender, non-distended with normal bowel sounds. Musculoskeletal: No edema bilaterally  Neurologic:  No new focal deficits  Psychiatric: Alert and oriented,  Capillary Refill: Brisk,< 3 seconds   Peripheral Pulses: +2 palpable, equal bilaterally    Labs:   Recent Labs     01/17/21  0551 01/17/21  0551 01/17/21  2212 01/18/21  0651 01/18/21  1112   WBC 12.1*  --   --  9.3  --    HGB 7.4*   < > 7.6* 7.7* 8.0*   HCT 22.4*   < > 22.4* 23.2* 24.7*     --   --  189  --     < > = values in this interval not displayed. Recent Labs     01/16/21  0623 01/17/21  0551 01/18/21  0651    144 144   K 3.8 3.4* 3.2*   * 111* 107   CO2 25 27 27   BUN 22* 17 19   CREATININE 0.7 0.7 0.8   CALCIUM 8.0* 7.9* 8.0*     Recent Labs     01/16/21  0623 01/17/21  0551 01/18/21  0651   AST 15 16 19   ALT 16 16 17   BILITOT <0.2 <0.2 <0.2   ALKPHOS 70 66 71     No results for input(s): INR in the last 72 hours. No results for input(s): Abe Rahat in the last 72 hours. Urinalysis:      Lab Results   Component Value Date    NITRU Negative 01/10/2021    WBCUA 153 01/10/2021    BACTERIA 4+ 01/10/2021    RBCUA 34 01/10/2021    BLOODU LARGE 01/10/2021    SPECGRAV 1.021 01/10/2021    GLUCOSEU Negative 01/10/2021       Radiology:  XR CHEST PORTABLE   Final Result   Slight interval improvement in appearance of diffuse multifocal airspace   opacities, likely a combination of multifocal pneumonia and superimposed   pulmonary edema. XR CHEST PORTABLE   Final Result   Stable diffuse bilateral lung disease compatible with pneumonia and/or edema         CT CHEST PULMONARY EMBOLISM W CONTRAST   Final Result   No evidence of pulmonary embolism. Mildly dilated and atherosclerotic thoracic aorta with no aneurysm or   dissection.       Extensive ground-glass opacities throughout both lungs some and subsegmental   bibasilar opacities which could represent extensive multisegmental   bronchopneumonia and/or pulmonary edema vs pulmonary hemorrhage. Small left pleural effusion. Small lymph nodes scattered in the mediastinum which are not pathologic by   size criteria. Moderate degenerative changes and postop changes in the spine      9 mm hypodensity left lobe liver which could represent a cyst but is   ill-defined. Suggest ultrasound correlation.          XR CHEST PORTABLE   Final Result   Bilateral ill-defined airspace opacities could represent atypical pneumonia,   multifocal bacterial pneumonia or subsegmental atelectasis                 Assessment/Plan:    Active Hospital Problems    Diagnosis    Pneumonia due to COVID-19 virus [U07.1, J12.82]    Hyperglycemia [R73.9]    Leukocytosis [D72.829]    Acute respiratory failure with hypoxia ([facility] [address] [J96.01]    2019 novel coronavirus-infected pneumonia (NCIP) [U07.1, J12.82]    Elevated LDH [R74.02]    Elevated AST (SGOT) [R74.01]    Elevated ferritin [R79.89]    Elevated d-dimer [R79.89]    History of depression [Z86.59]    Essential hypertension [I10]    Class 2 obesity due to excess calories with body mass index (BMI) of 35.0 to 35.9 in adult [E66.09, Z68.35]    Suspected COVID-19 virus infection [Z20.822]    Hypothyroidism [E03.9]    HLD (hyperlipidemia) [E78.5]    PCOS (polycystic ovarian syndrome) [E28.2]    Pneumonia due to organism [J18.9]         COVID 19 PNA - improved  - s/p Increased to 20 mg x 5 days then 10 mg x 5 days  - completed remdesivir day 5/5  - s/p convalescent plasma 12/30/2020  - monitor inflammatory markers  - added Symbicort and continue albuterol/ipatropium     Sepsis 2/2 Klebsiella UTI, Klebsiella bacteremia  Meets at least 2 SIRS Criteria:  Tachypnea > 20  HR > 90  WBC > 12K  Source: urine  -lactic acid q6h  -blood cultures positive for Klebsiella   -repeat blood cultures 1/11  -IV antibiotics: Vanc and cefepime -> cefepime  -IVF: 30 cc/kg given, followed by maintenance ivf  -WBC count and procalcitonin improved  -though she has been on high dose steroids, my concern is with a secondary bacterial process  -CTA Chest with contrast showed extensive ground-glass opacities throughout both lungs, small left pleural effusion, and a 9 mm hypodensity left lobe of the liver that could represent a cyst  -ID consulted, recs appreciated  - complete IV cefepime until 1/20/21     Acute GI bleed - suspect 2/2 upper source, started on IV PPI gtt-->PO, GI consulted. Hold ASA/lovenox, monitor hb, unable to safely perform EGD 2/2 resp status, appr GI recs     Acute blood loss anemia - hb dropped to 6.5, transfused blood and monitor hb Q6H, repeat hb trended down to 6.7, received another unit blood transfusion. Hb currently staying stable at 7.7     Liver lesion  - consider CT Ab/Pelvis or abdominal ultrasound inpt vs outpt     Acute respiratory failure with hypoxia -   - improved, now on vapotherm at 30L-->15L-->30L-->improved to 10L-->worsened to 15L-->13-->11-->10L  - consulted pulmonology   - wean O2 as tolerated     HTN - fairly controlled  - resume home meds - norvasc     Hx breast cancer  - c/w arimidex     HLD  - c/w statin     Hypothroidism  - c/w levothyroxine      DM 2  - hold metformin  - ssi, monitor on steroids  - a1c - 6.0     Depression  - c/w home meds     Constipation  - received Dulcolax suppository and enema  - continue Miralax  - add Senna nightly        DVT Prophylaxis: SCD  Diet: Dietary Nutrition Supplements: Clear Liquid Oral Supplement  DIET CARB CONTROL; Carb Control: 4 carb choices (60 gms)/meal  Code Status: Full Code    PT/OT Eval Status: ordered    Dispo - cont care, wean O2 as tolerated.  Pt wants to go home when she is ready, can consider LTAC if pt agreeable    Swetha Gaets MD

## 2021-01-19 NOTE — PLAN OF CARE
Problem: Pain:  Goal: Pain level will decrease  Description: Pain level will decrease  1/19/2021 0956 by Alexa Dennis RN  Outcome: Ongoing  1/19/2021 0036 by Giuliano Dorsey RN  Outcome: Ongoing  Goal: Control of acute pain  Description: Control of acute pain  1/19/2021 0956 by Alexa Dennis RN  Outcome: Ongoing  1/19/2021 0036 by Giuliano Dorsey RN  Outcome: Ongoing  Goal: Control of chronic pain  Description: Control of chronic pain  1/19/2021 0956 by Alexa Dennis RN  Outcome: Ongoing  1/19/2021 0036 by Giuliano Dorsey RN  Outcome: Ongoing     Problem: Airway Clearance - Ineffective  Goal: Achieve or maintain patent airway  1/19/2021 0956 by Alexa Dennis RN  Outcome: Ongoing  1/19/2021 0036 by Giuliano Dorsey RN  Outcome: Ongoing     Problem: Gas Exchange - Impaired  Goal: Absence of hypoxia  1/19/2021 0956 by Alexa Dennis RN  Outcome: Ongoing  Note: O2 sats monitored  1/19/2021 0036 by Giuliano Dorsey RN  Outcome: Ongoing  Goal: Promote optimal lung function  1/19/2021 0956 by Alexa Dennis RN  Outcome: Ongoing  1/19/2021 0036 by Giuliano Dorsey RN  Outcome: Ongoing     Problem: Breathing Pattern - Ineffective  Goal: Ability to achieve and maintain a regular respiratory rate  1/19/2021 0956 by Alexa Dennis RN  Outcome: Ongoing  1/19/2021 0036 by Giuliano Dorsey RN  Outcome: Ongoing     Problem:  Body Temperature -  Risk of, Imbalanced  Goal: Ability to maintain a body temperature within defined limits  1/19/2021 0956 by Alexa Dennis RN  Outcome: Ongoing  1/19/2021 0036 by Giuliano Dorsey RN  Outcome: Ongoing  Goal: Will regain or maintain usual level of consciousness  1/19/2021 0956 by Alexa Dennis RN  Outcome: Ongoing  1/19/2021 0036 by Giuliano Dorsey RN  Outcome: Ongoing  Goal: Complications related to the disease process, condition or treatment will be avoided or minimized  1/19/2021 0956 by Alexa Dennis RN  Outcome: Ongoing  1/19/2021 0036 by Giuliano Dorsey RN  Outcome: Ongoing     Problem: Isolation Precautions - Risk of Spread of Infection  Goal: Prevent transmission of infection  1/19/2021 0956 by Ana Palacios RN  Outcome: Ongoing  Note: Droplet precautions maintained  1/19/2021 0036 by Shameka Travis RN  Outcome: Ongoing     Problem: Nutrition Deficits  Goal: Optimize nutrtional status  1/19/2021 0956 by Ana Palacios RN  Outcome: Ongoing  1/19/2021 0036 by Shameka Travis RN  Outcome: Ongoing     Problem: Risk for Fluid Volume Deficit  Goal: Maintain normal heart rhythm  1/19/2021 0956 by Ana Palacios RN  Outcome: Ongoing  1/19/2021 0036 by Shameka Travis RN  Outcome: Ongoing  Goal: Maintain absence of muscle cramping  1/19/2021 0956 by Ana Palacios RN  Outcome: Ongoing  1/19/2021 0036 by Shameka Travis RN  Outcome: Ongoing  Goal: Maintain normal serum potassium, sodium, calcium, phosphorus, and pH  1/19/2021 0956 by Ana Palacios RN  Outcome: Ongoing  1/19/2021 0036 by Shameka Travis RN  Outcome: Ongoing     Problem: Loneliness or Risk for Loneliness  Goal: Demonstrate positive use of time alone when socialization is not possible  1/19/2021 0956 by Ana Palacios RN  Outcome: Ongoing  1/19/2021 0036 by Shameka Travis RN  Outcome: Ongoing     Problem: Fatigue  Goal: Verbalize increase energy and improved vitality  1/19/2021 0956 by Ana Palacios RN  Outcome: Ongoing  1/19/2021 0036 by Shameka Travis RN  Outcome: Ongoing     Problem: Patient Education: Go to Patient Education Activity  Goal: Patient/Family Education  1/19/2021 2368 by Ana Palacios RN  Outcome: Ongoing  1/19/2021 0036 by Shameka Travis RN  Outcome: Ongoing     Problem: Falls - Risk of:  Goal: Will remain free from falls  Description: Will remain free from falls  1/19/2021 0956 by Ana Palacios RN  Outcome: Ongoing  Note: Fall precautions in place. Bed locked and in lowest position. Call light and belongings within reach.    1/19/2021 0036 by Shameka Travis RN  Outcome: Ongoing  Goal: Absence of physical injury  Description: Absence of physical injury  1/19/2021 0956 by Carolyn Doherty RN  Outcome: Ongoing  1/19/2021 0036 by Michael Evans RN  Outcome: Ongoing     Problem: Nutrition  Goal: Optimal nutrition therapy  1/19/2021 0956 by Carolyn Doherty RN  Outcome: Ongoing  1/19/2021 0036 by Michael Evans RN  Outcome: Ongoing     Problem: Skin Integrity:  Goal: Will show no infection signs and symptoms  Description: Will show no infection signs and symptoms  1/19/2021 0956 by Carolyn Doherty RN  Outcome: Ongoing  1/19/2021 0036 by Michael Evans RN  Outcome: Ongoing  Goal: Absence of new skin breakdown  Description: Absence of new skin breakdown  1/19/2021 0956 by Carolyn Doherty RN  Outcome: Ongoing  Note: Pt encouraged to reposition as allows  1/19/2021 0036 by Michael Evans RN  Outcome: Ongoing     Problem: Discharge Planning:  Goal: Discharged to appropriate level of care  Description: Discharged to appropriate level of care  1/19/2021 0956 by Carolyn Doherty RN  Outcome: Ongoing  1/19/2021 0036 by Michael Evans RN  Outcome: Ongoing     Problem:  Bowel Function - Altered:  Goal: Bowel elimination is within specified parameters  Description: Bowel elimination is within specified parameters  1/19/2021 0956 by Carolyn Doherty RN  Outcome: Ongoing  1/19/2021 0036 by Michael Evans RN  Outcome: Ongoing     Problem: Fluid Volume - Imbalance:  Goal: Absence of imbalanced fluid volume signs and symptoms  Description: Absence of imbalanced fluid volume signs and symptoms  1/19/2021 0956 by Carolyn Doherty RN  Outcome: Ongoing  1/19/2021 0036 by Michael Evans RN  Outcome: Ongoing  Goal: Will show no signs and symptoms of excessive bleeding  Description: Will show no signs and symptoms of excessive bleeding  1/19/2021 0956 by Carolyn Doherty RN  Outcome: Ongoing  1/19/2021 0036 by Michael Evans RN  Outcome: Ongoing     Problem: Nausea/Vomiting:  Goal: Absence of nausea/vomiting  Description: Absence of nausea/vomiting  1/19/2021 0956 by Keith Wheeler RN  Outcome: Ongoing  1/19/2021 0036 by Aime Thompson RN  Outcome: Ongoing  Goal: Able to drink  Description: Able to drink  1/19/2021 0956 by Keith Wheeler RN  Outcome: Ongoing  1/19/2021 0036 by Aime Thompson RN  Outcome: Ongoing  Goal: Able to eat  Description: Able to eat  1/19/2021 0956 by Keith Wheeler RN  Outcome: Ongoing  1/19/2021 0036 by Aime Thompson RN  Outcome: Ongoing  Goal: Ability to achieve adequate nutritional intake will improve  Description: Ability to achieve adequate nutritional intake will improve  1/19/2021 0956 by Keith Wheeler RN  Outcome: Ongoing  1/19/2021 0036 by Aime Thompson RN  Outcome: Ongoing

## 2021-01-19 NOTE — PROGRESS NOTES
Hospitalist Progress Note      PCP: David Gilman MD    Date of Admission: 12/28/2020      Hospital Course: admitted with COVID19 PNA    Subjective:    Patient seen and examined. Feels tired and fatigued.   No further melena, no abd pain, no n/v.     Medications:  Reviewed    Infusion Medications    sodium chloride      sodium chloride      dextrose       Scheduled Medications    pantoprazole  40 mg Oral BID AC    furosemide  20 mg Intravenous Daily    sodium chloride (PF)  10 mL Intravenous Daily    sodium chloride flush  10 mL Intravenous 2 times per day    predniSONE  60 mg Oral Daily    lidocaine 1 % injection  5 mL Intradermal Once    cefepime  2 g Intravenous Q12H    senna  1 tablet Oral Nightly    polyethylene glycol  17 g Oral Daily    budesonide-formoterol  2 puff Inhalation BID    [Held by provider] enoxaparin  40 mg Subcutaneous BID    amLODIPine  5 mg Oral Daily    [Held by provider] lisinopril  30 mg Oral Daily    [Held by provider] metoprolol tartrate  100 mg Oral BID    [Held by provider] aspirin  81 mg Oral Daily    levothyroxine  75 mcg Oral Daily    atorvastatin  40 mg Oral Daily    insulin lispro  0-6 Units Subcutaneous TID WC    insulin lispro  0-3 Units Subcutaneous Nightly    anastrozole  1 mg Oral Daily    DULoxetine  60 mg Oral Daily     PRN Meds: albuterol sulfate HFA **AND** ipratropium **AND** MDI Treatment, sodium chloride, sodium chloride flush, LORazepam, sodium chloride, acetaminophen **OR** acetaminophen, glucose, dextrose, glucagon (rDNA), dextrose, ondansetron, guaiFENesin-dextromethorphan, sodium chloride      Intake/Output Summary (Last 24 hours) at 1/19/2021 0916  Last data filed at 1/19/2021 0851  Gross per 24 hour   Intake 1490 ml   Output 601 ml   Net 889 ml       Physical Exam Performed:    /76   Pulse 107   Temp 97.6 °F (36.4 °C) (Oral)   Resp 20   Ht 5' 6\" (1.676 m)   Wt 182 lb 8.7 oz (82.8 kg)   SpO2 97%   BMI 29.46 kg/m² embolism. Mildly dilated and atherosclerotic thoracic aorta with no aneurysm or   dissection. Extensive ground-glass opacities throughout both lungs some and subsegmental   bibasilar opacities which could represent extensive multisegmental   bronchopneumonia and/or pulmonary edema vs pulmonary hemorrhage. Small left pleural effusion. Small lymph nodes scattered in the mediastinum which are not pathologic by   size criteria. Moderate degenerative changes and postop changes in the spine      9 mm hypodensity left lobe liver which could represent a cyst but is   ill-defined. Suggest ultrasound correlation.          XR CHEST PORTABLE   Final Result   Bilateral ill-defined airspace opacities could represent atypical pneumonia,   multifocal bacterial pneumonia or subsegmental atelectasis                 Assessment/Plan:    Active Hospital Problems    Diagnosis    Pneumonia due to COVID-19 virus [U07.1, J12.82]    Hyperglycemia [R73.9]    Leukocytosis [D72.829]    Acute respiratory failure with hypoxia ([facility] [address] [J96.01]    2019 novel coronavirus-infected pneumonia (NCIP) [U07.1, J12.82]    Elevated LDH [R74.02]    Elevated AST (SGOT) [R74.01]    Elevated ferritin [R79.89]    Elevated d-dimer [R79.89]    History of depression [Z86.59]    Essential hypertension [I10]    Class 2 obesity due to excess calories with body mass index (BMI) of 35.0 to 35.9 in adult [E66.09, Z68.35]    Suspected COVID-19 virus infection [Z20.822]    Hypothyroidism [E03.9]    HLD (hyperlipidemia) [E78.5]    PCOS (polycystic ovarian syndrome) [E28.2]    Pneumonia due to organism [J18.9]   Acute hypoxic respiratory failure  - on 9 L NC - wean as tolerated to maintain SpO2 > 90%    COVID 19 PNA  - s/p remdesivir, convalescent palasma (12/30/2020)  - wean prednisone     Sepsis 2/2 Klebsiella UTI/bacteremia  - afebrile, leukocytosis resolved  - tachycardia    Klebsiella bacteremia  - c/w cefepime per ID through 1/20/21  - repeat bl cx 1/13 ngtd    Klebsiella UTI  - abx as above    Oral thrush - fluconazole through 1/19    UGIB - hematemsis and melena  - hold lovenox, asa  - no further bleeding, H/H stable  - GI c/s - EGD deferred given respiratory status at this time    Acute blood loss anemia  - h/h stable  - s/p prbc  - monitor     Liver lesion - 9 mm possible cyst, will order US to correlate    HTN - on Norvasc, at goal    Hx breast cancer - c/w arimidex    HLD  - c/w statin    Hypothyroidism  - c/w synthroid    DMT2  - hold metformin  - ssi   - a1c 6.0    Depression - c/w home meds    Hypokalemia - replace    DVT Prophylaxis: scds  Diet: Dietary Nutrition Supplements: Clear Liquid Oral Supplement  DIET CARB CONTROL; Carb Control: 4 carb choices (60 gms)/meal  Code Status: Full Code      Dispo - continue care    Aaliyah Zapata MD

## 2021-01-19 NOTE — PLAN OF CARE
Problem: Pain:  Goal: Pain level will decrease  Description: Pain level will decrease  Outcome: Ongoing  Goal: Control of acute pain  Description: Control of acute pain  Outcome: Ongoing  Goal: Control of chronic pain  Description: Control of chronic pain  Outcome: Ongoing     Problem: Airway Clearance - Ineffective  Goal: Achieve or maintain patent airway  Outcome: Ongoing     Problem: Gas Exchange - Impaired  Goal: Absence of hypoxia  Outcome: Ongoing  Goal: Promote optimal lung function  Outcome: Ongoing     Problem: Breathing Pattern - Ineffective  Goal: Ability to achieve and maintain a regular respiratory rate  Outcome: Ongoing     Problem:  Body Temperature -  Risk of, Imbalanced  Goal: Ability to maintain a body temperature within defined limits  Outcome: Ongoing  Goal: Will regain or maintain usual level of consciousness  Outcome: Ongoing  Goal: Complications related to the disease process, condition or treatment will be avoided or minimized  Outcome: Ongoing     Problem: Isolation Precautions - Risk of Spread of Infection  Goal: Prevent transmission of infection  Outcome: Ongoing     Problem: Nutrition Deficits  Goal: Optimize nutrtional status  Outcome: Ongoing     Problem: Risk for Fluid Volume Deficit  Goal: Maintain normal heart rhythm  Outcome: Ongoing  Goal: Maintain absence of muscle cramping  Outcome: Ongoing  Goal: Maintain normal serum potassium, sodium, calcium, phosphorus, and pH  Outcome: Ongoing     Problem: Loneliness or Risk for Loneliness  Goal: Demonstrate positive use of time alone when socialization is not possible  Outcome: Ongoing     Problem: Fatigue  Goal: Verbalize increase energy and improved vitality  Outcome: Ongoing     Problem: Patient Education: Go to Patient Education Activity  Goal: Patient/Family Education  Outcome: Ongoing     Problem: Falls - Risk of:  Goal: Will remain free from falls  Description: Will remain free from falls  Outcome: Ongoing  Goal: Absence of physical injury  Description: Absence of physical injury  Outcome: Ongoing     Problem: Nutrition  Goal: Optimal nutrition therapy  1/19/2021 0036 by Shari Amaya RN  Outcome: Ongoing  1/18/2021 1330 by Chetna Cat RD, LD  Outcome: Ongoing     Problem: Skin Integrity:  Goal: Will show no infection signs and symptoms  Description: Will show no infection signs and symptoms  Outcome: Ongoing  Goal: Absence of new skin breakdown  Description: Absence of new skin breakdown  Outcome: Ongoing     Problem: Discharge Planning:  Goal: Discharged to appropriate level of care  Description: Discharged to appropriate level of care  Outcome: Ongoing     Problem:  Bowel Function - Altered:  Goal: Bowel elimination is within specified parameters  Description: Bowel elimination is within specified parameters  Outcome: Ongoing     Problem: Fluid Volume - Imbalance:  Goal: Absence of imbalanced fluid volume signs and symptoms  Description: Absence of imbalanced fluid volume signs and symptoms  Outcome: Ongoing  Goal: Will show no signs and symptoms of excessive bleeding  Description: Will show no signs and symptoms of excessive bleeding  Outcome: Ongoing     Problem: Nausea/Vomiting:  Goal: Absence of nausea/vomiting  Description: Absence of nausea/vomiting  Outcome: Ongoing  Goal: Able to drink  Description: Able to drink  Outcome: Ongoing  Goal: Able to eat  Description: Able to eat  Outcome: Ongoing  Goal: Ability to achieve adequate nutritional intake will improve  Description: Ability to achieve adequate nutritional intake will improve  Outcome: Ongoing

## 2021-01-20 ENCOUNTER — APPOINTMENT (OUTPATIENT)
Dept: ULTRASOUND IMAGING | Age: 66
DRG: 177 | End: 2021-01-20
Payer: MEDICARE

## 2021-01-20 LAB
A/G RATIO: 1 (ref 1.1–2.2)
ALBUMIN SERPL-MCNC: 2.6 G/DL (ref 3.4–5)
ALP BLD-CCNC: 68 U/L (ref 40–129)
ALT SERPL-CCNC: 17 U/L (ref 10–40)
ANION GAP SERPL CALCULATED.3IONS-SCNC: 9 MMOL/L (ref 3–16)
AST SERPL-CCNC: 18 U/L (ref 15–37)
ATYPICAL LYMPHOCYTE RELATIVE PERCENT: 1 % (ref 0–6)
BANDED NEUTROPHILS RELATIVE PERCENT: 1 % (ref 0–7)
BASOPHILS ABSOLUTE: 0 K/UL (ref 0–0.2)
BASOPHILS RELATIVE PERCENT: 0 %
BILIRUB SERPL-MCNC: <0.2 MG/DL (ref 0–1)
BUN BLDV-MCNC: 23 MG/DL (ref 7–20)
C-REACTIVE PROTEIN: 4.7 MG/L (ref 0–5.1)
CALCIUM SERPL-MCNC: 8.3 MG/DL (ref 8.3–10.6)
CHLORIDE BLD-SCNC: 103 MMOL/L (ref 99–110)
CO2: 30 MMOL/L (ref 21–32)
CREAT SERPL-MCNC: 0.7 MG/DL (ref 0.6–1.2)
D DIMER: 3588 NG/ML DDU (ref 0–229)
EOSINOPHILS ABSOLUTE: 0 K/UL (ref 0–0.6)
EOSINOPHILS RELATIVE PERCENT: 0 %
GFR AFRICAN AMERICAN: >60
GFR NON-AFRICAN AMERICAN: >60
GLOBULIN: 2.6 G/DL
GLUCOSE BLD-MCNC: 143 MG/DL (ref 70–99)
GLUCOSE BLD-MCNC: 187 MG/DL (ref 70–99)
GLUCOSE BLD-MCNC: 89 MG/DL (ref 70–99)
GLUCOSE BLD-MCNC: 94 MG/DL (ref 70–99)
HCT VFR BLD CALC: 24.4 % (ref 36–48)
HEMOGLOBIN: 8.1 G/DL (ref 12–16)
LACTIC ACID: 1.3 MMOL/L (ref 0.4–2)
LYMPHOCYTES ABSOLUTE: 1.1 K/UL (ref 1–5.1)
LYMPHOCYTES RELATIVE PERCENT: 7 %
MCH RBC QN AUTO: 30.5 PG (ref 26–34)
MCHC RBC AUTO-ENTMCNC: 33.1 G/DL (ref 31–36)
MCV RBC AUTO: 92.4 FL (ref 80–100)
MONOCYTES ABSOLUTE: 0.6 K/UL (ref 0–1.3)
MONOCYTES RELATIVE PERCENT: 4 %
NEUTROPHILS ABSOLUTE: 12.6 K/UL (ref 1.7–7.7)
NEUTROPHILS RELATIVE PERCENT: 87 %
NUCLEATED RED BLOOD CELLS: 1 /100 WBC
OVALOCYTES: ABNORMAL
PDW BLD-RTO: 14.7 % (ref 12.4–15.4)
PERFORMED ON: ABNORMAL
PERFORMED ON: ABNORMAL
PERFORMED ON: NORMAL
PLATELET # BLD: 233 K/UL (ref 135–450)
PLATELET SLIDE REVIEW: ADEQUATE
PMV BLD AUTO: 8.7 FL (ref 5–10.5)
POIKILOCYTES: ABNORMAL
POLYCHROMASIA: ABNORMAL
POTASSIUM REFLEX MAGNESIUM: 3.6 MMOL/L (ref 3.5–5.1)
PROCALCITONIN: 0.16 NG/ML (ref 0–0.15)
RBC # BLD: 2.64 M/UL (ref 4–5.2)
SLIDE REVIEW: ABNORMAL
SODIUM BLD-SCNC: 142 MMOL/L (ref 136–145)
TOTAL PROTEIN: 5.2 G/DL (ref 6.4–8.2)
TSH REFLEX FT4: 2.43 UIU/ML (ref 0.27–4.2)
VACUOLATED NEUTROPHILS: PRESENT
WBC # BLD: 14.3 K/UL (ref 4–11)

## 2021-01-20 PROCEDURE — 36415 COLL VENOUS BLD VENIPUNCTURE: CPT

## 2021-01-20 PROCEDURE — 97164 PT RE-EVAL EST PLAN CARE: CPT

## 2021-01-20 PROCEDURE — 2700000000 HC OXYGEN THERAPY PER DAY

## 2021-01-20 PROCEDURE — 97168 OT RE-EVAL EST PLAN CARE: CPT

## 2021-01-20 PROCEDURE — 6370000000 HC RX 637 (ALT 250 FOR IP): Performed by: INTERNAL MEDICINE

## 2021-01-20 PROCEDURE — 86140 C-REACTIVE PROTEIN: CPT

## 2021-01-20 PROCEDURE — 2580000003 HC RX 258: Performed by: INTERNAL MEDICINE

## 2021-01-20 PROCEDURE — 6370000000 HC RX 637 (ALT 250 FOR IP): Performed by: FAMILY MEDICINE

## 2021-01-20 PROCEDURE — 2060000000 HC ICU INTERMEDIATE R&B

## 2021-01-20 PROCEDURE — 6360000002 HC RX W HCPCS: Performed by: INTERNAL MEDICINE

## 2021-01-20 PROCEDURE — 94761 N-INVAS EAR/PLS OXIMETRY MLT: CPT

## 2021-01-20 PROCEDURE — 6370000000 HC RX 637 (ALT 250 FOR IP): Performed by: STUDENT IN AN ORGANIZED HEALTH CARE EDUCATION/TRAINING PROGRAM

## 2021-01-20 PROCEDURE — 85025 COMPLETE CBC W/AUTO DIFF WBC: CPT

## 2021-01-20 PROCEDURE — 85379 FIBRIN DEGRADATION QUANT: CPT

## 2021-01-20 PROCEDURE — 97530 THERAPEUTIC ACTIVITIES: CPT

## 2021-01-20 PROCEDURE — 83605 ASSAY OF LACTIC ACID: CPT

## 2021-01-20 PROCEDURE — 84443 ASSAY THYROID STIM HORMONE: CPT

## 2021-01-20 PROCEDURE — 84145 PROCALCITONIN (PCT): CPT

## 2021-01-20 PROCEDURE — 97110 THERAPEUTIC EXERCISES: CPT

## 2021-01-20 PROCEDURE — 80053 COMPREHEN METABOLIC PANEL: CPT

## 2021-01-20 PROCEDURE — 76705 ECHO EXAM OF ABDOMEN: CPT

## 2021-01-20 RX ADMIN — LEVOTHYROXINE SODIUM 75 MCG: 0.07 TABLET ORAL at 05:36

## 2021-01-20 RX ADMIN — ANASTROZOLE 1 MG: 1 TABLET ORAL at 09:31

## 2021-01-20 RX ADMIN — INSULIN LISPRO 1 UNITS: 100 INJECTION, SOLUTION INTRAVENOUS; SUBCUTANEOUS at 21:04

## 2021-01-20 RX ADMIN — PREDNISONE 40 MG: 20 TABLET ORAL at 09:31

## 2021-01-20 RX ADMIN — SODIUM CHLORIDE, PRESERVATIVE FREE 10 ML: 5 INJECTION INTRAVENOUS at 21:03

## 2021-01-20 RX ADMIN — ATORVASTATIN CALCIUM 40 MG: 40 TABLET, FILM COATED ORAL at 09:31

## 2021-01-20 RX ADMIN — CEFEPIME HYDROCHLORIDE 2 G: 2 INJECTION, POWDER, FOR SOLUTION INTRAVENOUS at 13:33

## 2021-01-20 RX ADMIN — AMLODIPINE BESYLATE 5 MG: 5 TABLET ORAL at 09:31

## 2021-01-20 RX ADMIN — FUROSEMIDE 20 MG: 10 INJECTION, SOLUTION INTRAMUSCULAR; INTRAVENOUS at 09:31

## 2021-01-20 RX ADMIN — PANTOPRAZOLE SODIUM 40 MG: 40 TABLET, DELAYED RELEASE ORAL at 15:30

## 2021-01-20 RX ADMIN — SODIUM CHLORIDE, PRESERVATIVE FREE 10 ML: 5 INJECTION INTRAVENOUS at 09:31

## 2021-01-20 RX ADMIN — STANDARDIZED SENNA CONCENTRATE 8.6 MG: 8.6 TABLET ORAL at 21:03

## 2021-01-20 RX ADMIN — CEFEPIME HYDROCHLORIDE 2 G: 2 INJECTION, POWDER, FOR SOLUTION INTRAVENOUS at 00:38

## 2021-01-20 RX ADMIN — INSULIN LISPRO 1 UNITS: 100 INJECTION, SOLUTION INTRAVENOUS; SUBCUTANEOUS at 17:44

## 2021-01-20 RX ADMIN — Medication 10 ML: at 09:38

## 2021-01-20 RX ADMIN — DULOXETINE HYDROCHLORIDE 60 MG: 60 CAPSULE, DELAYED RELEASE ORAL at 09:31

## 2021-01-20 RX ADMIN — Medication 2 PUFF: at 21:04

## 2021-01-20 RX ADMIN — PANTOPRAZOLE SODIUM 40 MG: 40 TABLET, DELAYED RELEASE ORAL at 05:36

## 2021-01-20 RX ADMIN — Medication 2 PUFF: at 11:48

## 2021-01-20 ASSESSMENT — PAIN SCALES - WONG BAKER: WONGBAKER_NUMERICALRESPONSE: 0

## 2021-01-20 ASSESSMENT — PAIN SCALES - GENERAL
PAINLEVEL_OUTOF10: 0
PAINLEVEL_OUTOF10: 0

## 2021-01-20 NOTE — PROGRESS NOTES
Hospitalist Progress Note      PCP: John Buitrago MD    Date of Admission: 12/28/2020      Hospital Course: admitted with COVID19 PNA    Subjective:    Patient seen and examined.     Feeling better today  O2 weaned to 6  L NC  Has more energy     Medications:  Reviewed    Infusion Medications    sodium chloride      sodium chloride      dextrose       Scheduled Medications    predniSONE  40 mg Oral Daily    pantoprazole  40 mg Oral BID AC    furosemide  20 mg Intravenous Daily    sodium chloride (PF)  10 mL Intravenous Daily    sodium chloride flush  10 mL Intravenous 2 times per day    lidocaine 1 % injection  5 mL Intradermal Once    cefepime  2 g Intravenous Q12H    senna  1 tablet Oral Nightly    polyethylene glycol  17 g Oral Daily    budesonide-formoterol  2 puff Inhalation BID    [Held by provider] enoxaparin  40 mg Subcutaneous BID    amLODIPine  5 mg Oral Daily    [Held by provider] lisinopril  30 mg Oral Daily    [Held by provider] metoprolol tartrate  100 mg Oral BID    [Held by provider] aspirin  81 mg Oral Daily    levothyroxine  75 mcg Oral Daily    atorvastatin  40 mg Oral Daily    insulin lispro  0-6 Units Subcutaneous TID WC    insulin lispro  0-3 Units Subcutaneous Nightly    anastrozole  1 mg Oral Daily    DULoxetine  60 mg Oral Daily     PRN Meds: albuterol sulfate HFA **AND** ipratropium **AND** MDI Treatment, sodium chloride, sodium chloride flush, LORazepam, sodium chloride, acetaminophen **OR** acetaminophen, glucose, dextrose, glucagon (rDNA), dextrose, ondansetron, guaiFENesin-dextromethorphan, sodium chloride      Intake/Output Summary (Last 24 hours) at 1/20/2021 1802  Last data filed at 1/20/2021 1144  Gross per 24 hour   Intake 880 ml   Output 1301 ml   Net -421 ml       Physical Exam Performed:    /70   Pulse 104   Temp 98.1 °F (36.7 °C) (Oral)   Resp 18   Ht 5' 6\" (1.676 m)   Wt 182 lb 8.7 oz (82.8 kg)   SpO2 99%   BMI 29.46 kg/m² General appearance: No apparent distress, alert and cooperative. HEENT: Conjunctivae/corneas clear, neck supple w/ full ROM  Respiratory:  Normal respiratory effort. Faint rales  Cardiovascular: Regular rate and rhythm, normal S1/S2, no murmurs  Abdomen: Soft, non-tender, non-distended with normal bowel sounds. Musculoskeletal: No edema bilaterally  Neurologic:  No new focal deficits  Psychiatric: Alert and oriented, normal insight  Capillary Refill: Brisk,< 3 seconds   Peripheral Pulses: +2 palpable, equal bilaterally       Labs:   Recent Labs     01/18/21  0651 01/18/21  1112 01/19/21  0445 01/20/21  0535   WBC 9.3  --  10.7 14.3*   HGB 7.7* 8.0* 7.5* 8.1*   HCT 23.2* 24.7* 22.2* 24.4*     --  195 233     Recent Labs     01/18/21  0651 01/19/21  0446 01/19/21  1420 01/20/21  0535    143  --  142   K 3.2* 3.0* 3.6 3.6    105  --  103   CO2 27 29  --  30   BUN 19 21*  --  23*   CREATININE 0.8 0.8  --  0.7   CALCIUM 8.0* 8.1*  --  8.3     Recent Labs     01/18/21  0651 01/19/21  0446 01/20/21  0535   AST 19 16 18   ALT 17 16 17   BILITOT <0.2 <0.2 <0.2   ALKPHOS 71 64 68     No results for input(s): INR in the last 72 hours. No results for input(s): Zep[employer] Speaks in the last 72 hours. Urinalysis:      Lab Results   Component Value Date    NITRU Negative 01/10/2021    WBCUA 153 01/10/2021    BACTERIA 4+ 01/10/2021    RBCUA 34 01/10/2021    BLOODU LARGE 01/10/2021    SPECGRAV 1.021 01/10/2021    GLUCOSEU Negative 01/10/2021       Radiology:  US ABDOMEN LIMITED   Final Result   No sonographic abnormality. XR CHEST PORTABLE   Final Result   Slight interval improvement in appearance of diffuse multifocal airspace   opacities, likely a combination of multifocal pneumonia and superimposed   pulmonary edema.          XR CHEST PORTABLE   Final Result   Stable diffuse bilateral lung disease compatible with pneumonia and/or edema         CT CHEST PULMONARY EMBOLISM W CONTRAST   Final Result   No evidence of pulmonary embolism. Mildly dilated and atherosclerotic thoracic aorta with no aneurysm or   dissection. Extensive ground-glass opacities throughout both lungs some and subsegmental   bibasilar opacities which could represent extensive multisegmental   bronchopneumonia and/or pulmonary edema vs pulmonary hemorrhage. Small left pleural effusion. Small lymph nodes scattered in the mediastinum which are not pathologic by   size criteria. Moderate degenerative changes and postop changes in the spine      9 mm hypodensity left lobe liver which could represent a cyst but is   ill-defined. Suggest ultrasound correlation.          XR CHEST PORTABLE   Final Result   Bilateral ill-defined airspace opacities could represent atypical pneumonia,   multifocal bacterial pneumonia or subsegmental atelectasis                 Assessment/Plan:    Active Hospital Problems    Diagnosis    Pneumonia due to COVID-19 virus [U07.1, J12.82]    Hyperglycemia [R73.9]    Leukocytosis [D72.829]    Acute respiratory failure with hypoxia ([facility] [address] [J96.01]    2019 novel coronavirus-infected pneumonia ([facility]) [U07.1, J12.82]    Elevated LDH [R74.02]    Elevated AST (SGOT) [R74.01]    Elevated ferritin [R79.89]    Elevated d-dimer [R79.89]    History of depression [Z86.59]    Essential hypertension [I10]    Class 2 obesity due to excess calories with body mass index (BMI) of 35.0 to 35.9 in adult [E66.09, Z68.35]    Suspected COVID-19 virus infection [Z20.822]    Hypothyroidism [E03.9]    HLD (hyperlipidemia) [E78.5]    PCOS (polycystic ovarian syndrome) [E28.2]    Pneumonia due to organism [J18.9]   Acute hypoxic respiratory failure  - on 6 L NC - wean as tolerated to maintain SpO2 > 90%  - pt/ot    COVID 19 PNA  - s/p remdesivir, convalescent palasma (12/30/2020)  - on prednisone, tapering     Sepsis 2/2 Klebsiella UTI/bacteremia  - afebrile, leukocytosis resolved  - tachycardia    Klebsiella bacteremia  - c/w cefepime per ID through 1/20/21  - repeat bl cx 1/13 ngtd    Klebsiella UTI  - abx as above    Oral thrush - fluconazole through 1/19    UGIB - hematemsis and melena  - hold lovenox, asa  - no further bleeding, H/H stable  - GI c/s - EGD deferred given respiratory status at this time    Acute blood loss anemia  - s/p prbc  - monitor, Hb stable    Liver lesion - 9 mm possible cyst, US -  negative    HTN - on Norvasc, at goal    Hx breast cancer - c/w arimidex    HLD  - c/w statin    Hypothyroidism  - c/w synthroid    DMT2  - hold metformin  - ssi   - a1c 6.0    Depression - c/w home meds    Hypokalemia - replace    DVT Prophylaxis: scds  Diet: Dietary Nutrition Supplements: Clear Liquid Oral Supplement  DIET CARB CONTROL; Carb Control: 4 carb choices (60 gms)/meal  Code Status: Full Code      Dispo - continue care, discharge in 1-2 days pending oxygen requirement     Herminio Guzman MD

## 2021-01-20 NOTE — PROGRESS NOTES
Pt is awake sitting up in chair. Respirations easy even. Ultra sound tech is at bedside doing an ultrasound on pt's abdomen. Call light within reach.

## 2021-01-20 NOTE — PROGRESS NOTES
Occupational Therapy   Occupational Therapy re-evaluation/treatment  Date: 2021   Patient Name: Claude Anguiano  MRN: [id number]     : 1955    Date of Service: 2021    Discharge Recommendations:  Continue to assess pending progress, 24 hour supervision or assist  OT Equipment Recommendations  Equipment Needed: Yes  Mobility Devices: ADL Assistive Devices  ADL Assistive Devices: Toileting - Standard Commode  Other: pt may benefit from [facility] to increase independence and safety with toileting d/t significantly decreased activity tolerance    Assessment   Performance deficits / Impairments: Decreased endurance;Decreased ADL status  Assessment: Pt is a 62 yo F admitted with COVID-19. PMHx: arthritis, breast ca, depression, HTN, obesity, scoliosis, thyroid disease. PTA, pt lives with her son, reports she was completely independent in self-care and fxl mobility, working PT, driving. Pt with complicated hospitalization including GI bleed and poor respiratory status. Pt with new OT orders at this time, and now down to 6L O2. Pt demonstrated B UE ROM/strength WFL for ADLs and fxl transfers. Pt refused any mobility at this time, stating she has been urinating too much from lasix and refused to get out of recliner chair despite max encouragement and education on need to work with therapy to improve mobility, endurance, independence/safety. Pt states she is adamant to return home at d/c. RN reports pt is able to ambulate short distances SBA though refuses to get up to bathroom and does not ambulate much during the day. Anticipate that as long as pt is able to prove she can ambulate household distances and perform BADLs, she will be able to return home with 24/7 assist at d/c. Will continue to assess while hospitalized.   Prognosis: Fair  OT Education: OT Role;Plan of Care  Patient Education: importance of working with therapy, importance of trying to get up to bathroom instead of using purewick catheter  Barriers to Learning: insight  REQUIRES OT FOLLOW UP: Yes  Activity Tolerance  Activity Tolerance: self-limiting behavior and refusing to get up and move \"just give me exercises for my arms\"  Safety Devices  Safety Devices in place: Yes  Type of devices: Call light within reach; Left in chair;Nurse notified           Patient Diagnosis(es): The primary encounter diagnosis was Pneumonia due to organism. A diagnosis of COVID-19 virus test result unknown was also pertinent to this visit. has a past medical history of Arthritis, Breast cancer ([address] Depression, Hypertension, Obesity, Peptic ulcer disease, Scoliosis, Skin cancer, and Thyroid disease. has a past surgical history that includes Breast biopsy; Breast lumpectomy; Hysterectomy; Ovary removal; back surgery; Dilation and curettage of uterus; Carpal tunnel release; and Thyroid surgery. Restrictions  Restrictions/Precautions  Restrictions/Precautions: Isolation  Position Activity Restriction  Other position/activity restrictions: Droplet Plus :  6L O2 via NC    Subjective   General  Chart Reviewed: Yes  Patient assessed for rehabilitation services?: Yes  Additional Pertinent Hx: per H&P: \"The patient is a 63 y.o. female with past medical history of breast cancer currently on treatment with anastrozole, hypertension, polycystic ovarian syndrome using Metformin and not diabetic, hypothyroidism, depression who [facility] ED for 2 to 3-day history of above symptoms of nonproductive cough with concurrent sore throat and right ear fullness as well as just feeling off in terms of energy for the past few days. Patient overall remarks that she has not felt acutely in any respiratory distress but has felt somewhat more tired with exertion and possibly short of breath from that point of view. Patient otherwise denies any other symptoms of fever, chills, nausea/vomiting/diarrhea, dysuria, rashes, chest pain, dizziness, syncope.   She has noted some lightheadedness in addition to the sore throat and cough as well as this fatigue with exertion and possible shortness of breath. She denies any smoking or any other use of drugs, she also denies any sick contacts of note according to her. Currently in the ED she was found to be 88% on room air and was started on oxygen therapy but was eventually needing higher levels to about 6 L of nasal cannula oxygen. \"  Family / Caregiver Present: No  Referring Practitioner: Faheem Oneil MD  Diagnosis: COVID-19  Subjective  Subjective: pt met b/s for OT re-eval/tx. pt in recliner, agreeable to therapy however refused any mobility at this time. pt has purewick catheter in place and has been urinating very frequently, reports she cannot move or she will urinate again. refused to transfer to [facility] or get cleaned up. notified RN  General Comment  Comments: RN cleared pt for therapy. RN reports pt is always up in the chair and buttocks is beginning to becoming excoriated  Oxygen Therapy  SpO2: 92 %  O2 Device: High flow nasal cannula  O2 Flow Rate (L/min): 6 L/min  Social/Functional History  Social/Functional History  Lives With: Son(son, works 3pm-11pm)  Type of Home: House  Home Layout: One level  Home Access: Stairs to enter without rails  Entrance Stairs - Number of Steps: 1 step up from garage  Bathroom Shower/Tub: Walk-in shower  Bathroom Toilet: Standard  Home Equipment: [employer]  ADL Assistance: Independent  Homemaking Assistance: Independent  Ambulation Assistance: Independent(typically without device, though uses SPC prn)  Transfer Assistance: Independent  Active : Yes  Occupation: Part time employment  Type of occupation: various jobs, cleaning business       Objective        Orientation  Overall Orientation Status: Within Functional Limits     Balance  Sitting Balance: Supervision  Standing Balance: Unable to assess(comment)(pt refused)  Functional Mobility  Functional Mobility Comments: pt refused.  per RN, pt is able to ambulate short distance in room with SBA  ADL  Toileting: Dependent/Total(purewick catheter in place, pt refused to let OT remove it and refused to try getting up to bathroom despite max encouragement)  Additional Comments: pt refused ADLs at this time.  anticipate she would require setup for feeding and grooming, SBA for UB bathing/dressing, min A for LB bathing/dressing           Transfers  Sit to stand: Unable to assess  Stand to sit: Unable to assess  Transfer Comments: pt refused     Cognition  Cognition Comment: decreased insight/motivation                                        Plan   Plan  Times per week: 3-5  Times per day: Daily  Current Treatment Recommendations: Strengthening, Endurance Training, Patient/Caregiver Education & Training, Equipment Evaluation, Education, & procurement, ROM, Balance Training, Functional Mobility Training, Safety Education & Training, Self-Care / ADL      AM-PAC Score        [facility] Inpatient Daily Activity Raw Score: 21 (01/08/21 1555)  AM-PAC Inpatient ADL T-Scale Score : 44.27 (01/08/21 1555)  ADL Inpatient CMS 0-100% Score: 32.79 (01/08/21 1555)  ADL Inpatient CMS G-Code Modifier : Jj [email or web address] (01/08/21 1555)    Goals  Short term goals  Time Frame for Short term goals: prior to d/c  Short term goal 1: Pt will complete fxl transfers independently  Short term goal 2: Pt will complete fxl mobility independently  Short term goal 3: Pt will toilet independently  Short term goal 4: Pt will participate in B UE HEP in order to increase strength and endurance for ADLs  Short term goal 5: Pt will tolerate x5 mins of standing activity with O2 sats remaining above 90%  Long term goals  Time Frame for Long term goals : LTG=STG  Patient Goals   Patient goals : to go home       Therapy Time   Individual Concurrent Group Co-treatment   Time In 1050         Time Out 1115         Minutes [address], OTR/L [address]

## 2021-01-20 NOTE — PROGRESS NOTES
Physical Therapy    Facility/Department: [address] PROGRESSIVE CARE  Re-Evaluation    NAME: Azucena Mccarthy  : 1955  MRN: [id number]    Date of Service: 2021    Discharge Recommendations:  Continue to assess pending progress   PT Equipment Recommendations  Other: Will monitor for potential equipt needs. Assessment   Body structures, Functions, Activity limitations: Decreased functional mobility ; Decreased endurance  Assessment: 62 y/o female admit 2020 with Pneumonia, COVID +. Hospital course complicated by GI Bleed (anticipate EGD when medical status warrants). PMH as noted including Breast Ca/Lumpect, Scoliosis, Back Surg. PTA pt living with son in home with 1st floor bed/bath. 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 Pt currently requiring O2 6L via High Flow and does not appear in distress at this time. Pt is refusing any functional activities despite gentle encouragement. Pt is insistent upon d/c home; however will need to ensure adequate assist and ability to amb at least short/household distances. Will need to monitor pt's progress as O2 requirements cont decrease. Prognosis: Fair  Decision Making: Medium Complexity  History: 62 y/o female admit 2020 with Pneumonia, COVID +. Hospital course complicated by GI Bleed (anticipate EGD when medical status warrants). PMH as noted including Breast Ca/Lumpect, Scoliosis, Back Surg. Exam: See above. Clinical Presentation: See above. Patient Education: Encouragement for at least min ex/functional activities. Barriers to Learning: Endurance. REQUIRES PT FOLLOW UP: Yes  Activity Tolerance  Activity Tolerance: O2 needs now 6L via High Flow. Referral received to resume PT Services although pt adamantly declined any functional activities \"not today. \". Patient Diagnosis(es): The primary encounter diagnosis was Pneumonia due to organism.  A diagnosis of COVID-19 virus test result unknown was also pertinent to this visit. has a past medical history of Arthritis, Breast cancer ([address] Depression, Hypertension, Obesity, Peptic ulcer disease, Scoliosis, Skin cancer, and Thyroid disease. has a past surgical history that includes Breast biopsy; Breast lumpectomy; Hysterectomy; Ovary removal; back surgery; Dilation and curettage of uterus; Carpal tunnel release; and Thyroid surgery. Restrictions  Restrictions/Precautions  Restrictions/Precautions: Isolation  Position Activity Restriction  Other position/activity restrictions: Droplet Plus COVID +. O2 6L via High Flow. Vision/Hearing  Vision: Within Functional Limits  Hearing: Within functional limits     Subjective  General  Chart Reviewed: Yes  Patient assessed for rehabilitation services?: Yes  Additional Pertinent Hx: 62 y/o female admit 12/28/2020 with Pneumonia, COVID +. Hospital course complicated by GI Bleed (anticipate EGD when medical status warrants). PMH as noted including Breast Ca/Lumpect, Scoliosis, Back Surg. Family / Caregiver Present: No  Referring Practitioner: Dr. Anaya Martino  Diagnosis: Pneumonia, COVID +; GI Bleed. Subjective  Subjective: Pt reports very tired; \"just buried my father yesterday; didn't get much sleep. \"  Pain Screening  Patient Currently in Pain: No          Orientation  Orientation  Overall Orientation Status: Within Functional Limits  Social/Functional History  Social/Functional History  Lives With: Son(son, works 3pm-11pm)  Type of Home: House  Home Layout: One level  Home Access: Stairs to enter without rails  Entrance Stairs - Number of Steps: 1 step up from garage  Bathroom Shower/Tub: Walk-in shower  Bathroom Toilet: Standard  Home Equipment: [employer]  ADL Assistance: Independent  Homemaking Assistance: Independent  Ambulation Assistance: Independent(typically without device, though uses SPC prn)  Transfer Assistance: Independent  Active : Yes  Occupation: Part time employment  Type of occupation: various jobs, cleaning business  Cognition   Cognition  Cognition Comment: Diminished insight/motivation. Objective          AROM RLE (degrees)  RLE AROM: WFL  AROM LLE (degrees)  LLE AROM : WFL  AROM RUE (degrees)  RUE AROM : WFL  AROM LUE (degrees)  LUE AROM : WFL  Strength Other  Other: Grossly at least 3+ 4-/5 although limited assess due to pt fatigue/limited willingness to participate. Bed mobility  Comment: Pt oob in chair, declined assess. Transfers  Comment: Pt refused any functional activities at this time; purwick in place. Pt refused attempt bsc activities. Exercises  Hip Flexion: 5x B LEs. Knee Long Arc Quad: 5x B LEs. Ankle Pumps: 10x     Plan   Plan  Times per week: 3-5 x week while in acute care setting. Current Treatment Recommendations: Strengthening, Functional Mobility Training, Transfer Training, Gait Training, Safety Education & Training, Patient/Caregiver Education & Training  Safety Devices  Type of devices: Call light within reach, Chair alarm in place, Left in chair, Nurse notified  Restraints  Initially in place: No      Goals  Short term goals  Time Frame for Short term goals: Upon d/c acute care setting. Short term goal 1: Transfers SBA/Supervision. Short term goal 2: Amb with/without assist device 48' SBA/Supervision.   Patient Goals   Patient goals : I am going home when I leave this hospital.       Therapy Time   Individual Concurrent Group Co-treatment   Time In [facility], PT

## 2021-01-20 NOTE — PLAN OF CARE
treatment will be avoided or minimized  Outcome: Ongoing  Note: . Problem: Isolation Precautions - Risk of Spread of Infection  Goal: Prevent transmission of infection  Outcome: Ongoing  Note: Pt remains in isolation for COVID. Problem: Nutrition Deficits  Goal: Optimize nutrtional status  Outcome: Ongoing  Note: Appetite is fair to good. No complaints of nausea. Problem: Risk for Fluid Volume Deficit  Goal: Maintain normal heart rhythm  Outcome: Ongoing  Note: Pt's labs being monitored. She remains in sinus rhythm but rates are tachy, low 100s. Problem: Risk for Fluid Volume Deficit  Goal: Maintain absence of muscle cramping  Outcome: Ongoing  Note: Pt has had no complaints of muscle cramping. Problem: Risk for Fluid Volume Deficit  Goal: Maintain normal serum potassium, sodium, calcium, phosphorus, and pH  Outcome: Ongoing  Note: Labs being monitored. Problem: Loneliness or Risk for Loneliness  Goal: Demonstrate positive use of time alone when socialization is not possible  Outcome: Ongoing  Note: Pt has had no complaints of loneliness. She is capable to talking on the phone to her family. Cell phone is in the room. Problem: Fatigue  Goal: Verbalize increase energy and improved vitality  Outcome: Ongoing  Note: Pt has been up in the chair. She does have therapy but does not work with them. She has stood up from every few hours. She has also decided to use the bed side commode as opposed to the pure wick. Problem: Patient Education: Go to Patient Education Activity  Goal: Patient/Family Education  Outcome: Ongoing  Note: Pt is receptive to education. Problem: Falls - Risk of:  Goal: Will remain free from falls  Description: Will remain free from falls  Outcome: Ongoing  Note: Fall risk assessment completed every shift. All precautions in place. Pt has call light within reach at all times. Room clear of clutter. Pt aware to call for assistance when getting up.        Problem: Falls - Risk of:  Goal: Absence of physical injury  Description: Absence of physical injury  Outcome: Ongoing  Note: No signs of physical injury. Problem: Nutrition  Goal: Optimal nutrition therapy  Outcome: Ongoing  Note: Appetite fair. Problem: Skin Integrity:  Goal: Will show no infection signs and symptoms  Description: Will show no infection signs and symptoms  Outcome: Ongoing  Note: Pt stays in the chair. She does have a chair cushion in use. She does stand up from chair every few hours to relieve pressure from bottom. Problem: Skin Integrity:  Goal: Absence of new skin breakdown  Description: Absence of new skin breakdown  Outcome: Ongoing  Note: No signs of new skin breakdown. Her bottom is reddened and has some areas of peeling skin. Zinc ointment has been applied. Problem: Discharge Planning:  Goal: Discharged to appropriate level of care  Description: Discharged to appropriate level of care  Outcome: Ongoing  Note: No discharge plans are in place. Pt plans on going home at discharge. Problem: Bowel Function - Altered:  Goal: Bowel elimination is within specified parameters  Description: Bowel elimination is within specified parameters  Outcome: Ongoing  Note: Pt has not had a bowel movement this shift. No signs of bleeding noted. Problem: Fluid Volume - Imbalance:  Description: Avoid the routine use of orogastric or nasogastric lavage. Goal: Absence of imbalanced fluid volume signs and symptoms  Description: Absence of imbalanced fluid volume signs and symptoms  Outcome: Ongoing  Note: VSS. Labs being monitored. Problem: Fluid Volume - Imbalance:  Description: Avoid the routine use of orogastric or nasogastric lavage. Goal: Will show no signs and symptoms of excessive bleeding  Description: Will show no signs and symptoms of excessive bleeding  Outcome: Ongoing  Note: Pt has shown no signs of bleeding.      Problem: Nausea/Vomiting:  Goal: Absence of nausea/vomiting  Description: Absence of nausea/vomiting  Outcome: Completed  Note: Pt has had no N/V     Problem: Nausea/Vomiting:  Goal: Able to drink  Description: Able to drink  Outcome: Completed  Note: Pt is tolerating liquids. Problem: Nausea/Vomiting:  Goal: Ability to achieve adequate nutritional intake will improve  Description: Ability to achieve adequate nutritional intake will improve  Outcome: Completed  Note: Saul Smith

## 2021-01-20 NOTE — PLAN OF CARE
Problem: Pain:  Goal: Pain level will decrease  Description: Pain level will decrease  1/19/2021 2333 by Machelle Mccauley RN  Outcome: Ongoing  1/19/2021 0956 by Clarita Drake RN  Outcome: Ongoing  Goal: Control of acute pain  Description: Control of acute pain  1/19/2021 2333 by Machelle Mccauley RN  Outcome: Ongoing  1/19/2021 0956 by Clarita Drake RN  Outcome: Ongoing  Goal: Control of chronic pain  Description: Control of chronic pain  1/19/2021 2333 by Machelle Mccauley RN  Outcome: Ongoing  1/19/2021 0956 by Clarita Drake RN  Outcome: Ongoing     Problem: Airway Clearance - Ineffective  Goal: Achieve or maintain patent airway  1/19/2021 2333 by Machelle Mccauley RN  Outcome: Ongoing  1/19/2021 0956 by Clarita Drake RN  Outcome: Ongoing     Problem: Gas Exchange - Impaired  Goal: Absence of hypoxia  1/19/2021 2333 by Machelle Mccauley RN  Outcome: Ongoing  1/19/2021 0956 by Clarita Drake RN  Outcome: Ongoing  Note: O2 sats monitored  Goal: Promote optimal lung function  1/19/2021 2333 by Machlele Mccauley RN  Outcome: Ongoing  1/19/2021 0956 by Clarita Drake RN  Outcome: Ongoing     Problem: Breathing Pattern - Ineffective  Goal: Ability to achieve and maintain a regular respiratory rate  1/19/2021 2333 by Machelle Mccauley RN  Outcome: Ongoing  1/19/2021 0956 by Clarita Drake RN  Outcome: Ongoing     Problem:  Body Temperature -  Risk of, Imbalanced  Goal: Ability to maintain a body temperature within defined limits  1/19/2021 2333 by Machelle Mccauley RN  Outcome: Ongoing  1/19/2021 0956 by Clarita Drake RN  Outcome: Ongoing  Goal: Will regain or maintain usual level of consciousness  1/19/2021 2333 by Machelle Mccauley RN  Outcome: Ongoing  1/19/2021 0956 by Clarita Drake RN  Outcome: Ongoing  Goal: Complications related to the disease process, condition or treatment will be avoided or minimized  1/19/2021 2333 by Machelle Mccauley RN  Outcome: Ongoing  1/19/2021 0956 by Clarita Drake RN  Outcome: Ongoing     Problem: Isolation Precautions - Risk of Spread of Infection  Goal: Prevent transmission of infection  1/19/2021 2333 by Estela North RN  Outcome: Ongoing  1/19/2021 0956 by Alfa Lea RN  Outcome: Ongoing  Note: Droplet precautions maintained     Problem: Nutrition Deficits  Goal: Optimize nutrtional status  1/19/2021 2333 by Estela North RN  Outcome: Ongoing  1/19/2021 0956 by Alfa Lea RN  Outcome: Ongoing     Problem: Risk for Fluid Volume Deficit  Goal: Maintain normal heart rhythm  1/19/2021 2333 by Estela North RN  Outcome: Ongoing  1/19/2021 0956 by Alfa Lea RN  Outcome: Ongoing  Goal: Maintain absence of muscle cramping  1/19/2021 2333 by Estela North RN  Outcome: Ongoing  1/19/2021 0956 by Alfa Lea RN  Outcome: Ongoing  Goal: Maintain normal serum potassium, sodium, calcium, phosphorus, and pH  1/19/2021 2333 by Estela North RN  Outcome: Ongoing  1/19/2021 0956 by Alfa Lea RN  Outcome: Ongoing     Problem: Loneliness or Risk for Loneliness  Goal: Demonstrate positive use of time alone when socialization is not possible  1/19/2021 2333 by Estela North RN  Outcome: Ongoing  1/19/2021 0956 by Alfa Lea RN  Outcome: Ongoing     Problem: Fatigue  Goal: Verbalize increase energy and improved vitality  1/19/2021 2333 by Estela North RN  Outcome: Ongoing  1/19/2021 0956 by Alfa Lea RN  Outcome: Ongoing     Problem: Patient Education: Go to Patient Education Activity  Goal: Patient/Family Education  1/19/2021 2333 by Estela North RN  Outcome: Ongoing  1/19/2021 0956 by Alfa Lea RN  Outcome: Ongoing     Problem: Falls - Risk of:  Goal: Will remain free from falls  Description: Will remain free from falls  1/19/2021 2333 by Estela North RN  Outcome: Ongoing  1/19/2021 0956 by Alfa Lea RN  Outcome: Ongoing  Note: Fall precautions in place. Bed locked and in lowest position. Call light and belongings within reach.    Goal: Absence of physical injury  Description: Absence of physical injury  1/19/2021 2333 by Jud South RN  Outcome: Ongoing  1/19/2021 0956 by Victor Hugo Richards RN  Outcome: Ongoing     Problem: Nutrition  Goal: Optimal nutrition therapy  1/19/2021 2333 by Jud South RN  Outcome: Ongoing  1/19/2021 0956 by Victor Hugo Richards RN  Outcome: Ongoing     Problem: Skin Integrity:  Goal: Will show no infection signs and symptoms  Description: Will show no infection signs and symptoms  1/19/2021 2333 by Jud South RN  Outcome: Ongoing  1/19/2021 0956 by Victor Hugo Richards RN  Outcome: Ongoing  Goal: Absence of new skin breakdown  Description: Absence of new skin breakdown  1/19/2021 2333 by Jud South RN  Outcome: Ongoing  1/19/2021 0956 by Victor Hugo Richards RN  Outcome: Ongoing  Note: Pt encouraged to reposition as allows     Problem: Discharge Planning:  Goal: Discharged to appropriate level of care  Description: Discharged to appropriate level of care  1/19/2021 2333 by Jud South RN  Outcome: Ongoing  1/19/2021 0956 by Victor Hugo Richards RN  Outcome: Ongoing     Problem:  Bowel Function - Altered:  Goal: Bowel elimination is within specified parameters  Description: Bowel elimination is within specified parameters  1/19/2021 2333 by Jud South RN  Outcome: Ongoing  1/19/2021 0956 by Victor Hugo Richards RN  Outcome: Ongoing     Problem: Fluid Volume - Imbalance:  Goal: Absence of imbalanced fluid volume signs and symptoms  Description: Absence of imbalanced fluid volume signs and symptoms  1/19/2021 2333 by Jud South RN  Outcome: Ongoing  1/19/2021 0956 by Victor Hugo Richards RN  Outcome: Ongoing  Goal: Will show no signs and symptoms of excessive bleeding  Description: Will show no signs and symptoms of excessive bleeding  1/19/2021 2333 by Jud South RN  Outcome: Ongoing  1/19/2021 0956 by Victor Hugo Richards RN  Outcome: Ongoing     Problem: Nausea/Vomiting:  Goal: Absence of nausea/vomiting  Description: Absence of nausea/vomiting  1/19/2021 2333 by Mathew Sauceda RN  Outcome: Ongoing  1/19/2021 0956 by Ean Garcia RN  Outcome: Ongoing  Goal: Able to drink  Description: Able to drink  1/19/2021 2333 by Mathew Sauceda RN  Outcome: Ongoing  1/19/2021 0956 by Ean Garcia RN  Outcome: Ongoing  Goal: Able to eat  Description: Able to eat  1/19/2021 2333 by Mathew Sauceda RN  Outcome: Ongoing  1/19/2021 0956 by Ean Garcia RN  Outcome: Ongoing  Goal: Ability to achieve adequate nutritional intake will improve  Description: Ability to achieve adequate nutritional intake will improve  1/19/2021 2333 by Mathew Sauceda RN  Outcome: Ongoing  1/19/2021 0956 by Ean Garcia RN  Outcome: Ongoing

## 2021-01-21 LAB
A/G RATIO: 1 (ref 1.1–2.2)
ALBUMIN SERPL-MCNC: 2.5 G/DL (ref 3.4–5)
ALP BLD-CCNC: 63 U/L (ref 40–129)
ALT SERPL-CCNC: 17 U/L (ref 10–40)
ANION GAP SERPL CALCULATED.3IONS-SCNC: 8 MMOL/L (ref 3–16)
AST SERPL-CCNC: 20 U/L (ref 15–37)
BASOPHILS ABSOLUTE: 0 K/UL (ref 0–0.2)
BASOPHILS RELATIVE PERCENT: 0.3 %
BILIRUB SERPL-MCNC: <0.2 MG/DL (ref 0–1)
BUN BLDV-MCNC: 22 MG/DL (ref 7–20)
CALCIUM SERPL-MCNC: 8.4 MG/DL (ref 8.3–10.6)
CHLORIDE BLD-SCNC: 100 MMOL/L (ref 99–110)
CO2: 32 MMOL/L (ref 21–32)
CREAT SERPL-MCNC: 0.7 MG/DL (ref 0.6–1.2)
EOSINOPHILS ABSOLUTE: 0.1 K/UL (ref 0–0.6)
EOSINOPHILS RELATIVE PERCENT: 1.1 %
GFR AFRICAN AMERICAN: >60
GFR NON-AFRICAN AMERICAN: >60
GLOBULIN: 2.6 G/DL
GLUCOSE BLD-MCNC: 102 MG/DL (ref 70–99)
GLUCOSE BLD-MCNC: 108 MG/DL (ref 70–99)
GLUCOSE BLD-MCNC: 143 MG/DL (ref 70–99)
GLUCOSE BLD-MCNC: 145 MG/DL (ref 70–99)
GLUCOSE BLD-MCNC: 161 MG/DL (ref 70–99)
GLUCOSE BLD-MCNC: 172 MG/DL (ref 70–99)
HCT VFR BLD CALC: 23.1 % (ref 36–48)
HEMOGLOBIN: 7.5 G/DL (ref 12–16)
LYMPHOCYTES ABSOLUTE: 1.6 K/UL (ref 1–5.1)
LYMPHOCYTES RELATIVE PERCENT: 14.3 %
MAGNESIUM: 2.1 MG/DL (ref 1.8–2.4)
MCH RBC QN AUTO: 30.1 PG (ref 26–34)
MCHC RBC AUTO-ENTMCNC: 32.3 G/DL (ref 31–36)
MCV RBC AUTO: 93.3 FL (ref 80–100)
MONOCYTES ABSOLUTE: 1 K/UL (ref 0–1.3)
MONOCYTES RELATIVE PERCENT: 8.4 %
NEUTROPHILS ABSOLUTE: 8.7 K/UL (ref 1.7–7.7)
NEUTROPHILS RELATIVE PERCENT: 75.9 %
PDW BLD-RTO: 14.7 % (ref 12.4–15.4)
PERFORMED ON: ABNORMAL
PLATELET # BLD: 239 K/UL (ref 135–450)
PMV BLD AUTO: 8.8 FL (ref 5–10.5)
POTASSIUM REFLEX MAGNESIUM: 3 MMOL/L (ref 3.5–5.1)
PRO-BNP: 103 PG/ML (ref 0–124)
RBC # BLD: 2.48 M/UL (ref 4–5.2)
SODIUM BLD-SCNC: 140 MMOL/L (ref 136–145)
TOTAL PROTEIN: 5.1 G/DL (ref 6.4–8.2)
WBC # BLD: 11.4 K/UL (ref 4–11)

## 2021-01-21 PROCEDURE — 6360000002 HC RX W HCPCS: Performed by: INTERNAL MEDICINE

## 2021-01-21 PROCEDURE — 6370000000 HC RX 637 (ALT 250 FOR IP): Performed by: STUDENT IN AN ORGANIZED HEALTH CARE EDUCATION/TRAINING PROGRAM

## 2021-01-21 PROCEDURE — 85025 COMPLETE CBC W/AUTO DIFF WBC: CPT

## 2021-01-21 PROCEDURE — 2580000003 HC RX 258: Performed by: INTERNAL MEDICINE

## 2021-01-21 PROCEDURE — 97530 THERAPEUTIC ACTIVITIES: CPT

## 2021-01-21 PROCEDURE — 6370000000 HC RX 637 (ALT 250 FOR IP): Performed by: INTERNAL MEDICINE

## 2021-01-21 PROCEDURE — 97110 THERAPEUTIC EXERCISES: CPT

## 2021-01-21 PROCEDURE — 83735 ASSAY OF MAGNESIUM: CPT

## 2021-01-21 PROCEDURE — 83880 ASSAY OF NATRIURETIC PEPTIDE: CPT

## 2021-01-21 PROCEDURE — 6370000000 HC RX 637 (ALT 250 FOR IP): Performed by: FAMILY MEDICINE

## 2021-01-21 PROCEDURE — 2700000000 HC OXYGEN THERAPY PER DAY

## 2021-01-21 PROCEDURE — 94761 N-INVAS EAR/PLS OXIMETRY MLT: CPT

## 2021-01-21 PROCEDURE — 2060000000 HC ICU INTERMEDIATE R&B

## 2021-01-21 PROCEDURE — 80053 COMPREHEN METABOLIC PANEL: CPT

## 2021-01-21 RX ORDER — POTASSIUM CHLORIDE 20 MEQ/1
40 TABLET, EXTENDED RELEASE ORAL ONCE
Status: COMPLETED | OUTPATIENT
Start: 2021-01-21 | End: 2021-01-21

## 2021-01-21 RX ADMIN — CEFEPIME HYDROCHLORIDE 2000 MG: 2 INJECTION, POWDER, FOR SOLUTION INTRAVENOUS at 01:03

## 2021-01-21 RX ADMIN — FUROSEMIDE 20 MG: 10 INJECTION, SOLUTION INTRAMUSCULAR; INTRAVENOUS at 09:07

## 2021-01-21 RX ADMIN — LEVOTHYROXINE SODIUM 75 MCG: 0.07 TABLET ORAL at 05:19

## 2021-01-21 RX ADMIN — CEFEPIME HYDROCHLORIDE 2 G: 2 INJECTION, POWDER, FOR SOLUTION INTRAVENOUS at 12:30

## 2021-01-21 RX ADMIN — SODIUM CHLORIDE, PRESERVATIVE FREE 10 ML: 5 INJECTION INTRAVENOUS at 21:37

## 2021-01-21 RX ADMIN — INSULIN LISPRO 1 UNITS: 100 INJECTION, SOLUTION INTRAVENOUS; SUBCUTANEOUS at 21:37

## 2021-01-21 RX ADMIN — Medication 10 ML: at 09:06

## 2021-01-21 RX ADMIN — Medication 2 PUFF: at 09:08

## 2021-01-21 RX ADMIN — POTASSIUM CHLORIDE 40 MEQ: 1500 TABLET, EXTENDED RELEASE ORAL at 09:07

## 2021-01-21 RX ADMIN — POLYETHYLENE GLYCOL 3350 17 G: 17 POWDER, FOR SOLUTION ORAL at 09:07

## 2021-01-21 RX ADMIN — Medication 2 PUFF: at 19:56

## 2021-01-21 RX ADMIN — INSULIN LISPRO 1 UNITS: 100 INJECTION, SOLUTION INTRAVENOUS; SUBCUTANEOUS at 18:30

## 2021-01-21 RX ADMIN — ACETAMINOPHEN 650 MG: 325 TABLET ORAL at 05:19

## 2021-01-21 RX ADMIN — PREDNISONE 40 MG: 20 TABLET ORAL at 09:06

## 2021-01-21 RX ADMIN — SODIUM CHLORIDE, PRESERVATIVE FREE 10 ML: 5 INJECTION INTRAVENOUS at 09:07

## 2021-01-21 RX ADMIN — ATORVASTATIN CALCIUM 40 MG: 40 TABLET, FILM COATED ORAL at 09:07

## 2021-01-21 RX ADMIN — PANTOPRAZOLE SODIUM 40 MG: 40 TABLET, DELAYED RELEASE ORAL at 16:40

## 2021-01-21 RX ADMIN — LORAZEPAM 0.5 MG: 0.5 TABLET ORAL at 21:58

## 2021-01-21 RX ADMIN — INSULIN LISPRO 1 UNITS: 100 INJECTION, SOLUTION INTRAVENOUS; SUBCUTANEOUS at 12:29

## 2021-01-21 RX ADMIN — STANDARDIZED SENNA CONCENTRATE 8.6 MG: 8.6 TABLET ORAL at 21:36

## 2021-01-21 RX ADMIN — PANTOPRAZOLE SODIUM 40 MG: 40 TABLET, DELAYED RELEASE ORAL at 05:19

## 2021-01-21 RX ADMIN — ANASTROZOLE 1 MG: 1 TABLET ORAL at 09:06

## 2021-01-21 RX ADMIN — AMLODIPINE BESYLATE 5 MG: 5 TABLET ORAL at 09:07

## 2021-01-21 RX ADMIN — DULOXETINE HYDROCHLORIDE 60 MG: 60 CAPSULE, DELAYED RELEASE ORAL at 09:07

## 2021-01-21 ASSESSMENT — PAIN SCALES - GENERAL
PAINLEVEL_OUTOF10: 0

## 2021-01-21 NOTE — CARE COORDINATION
Spoke to patient over the phone. Discussed her participation w/ therapy and they have concerns about her return to home. Patient says she will work with them today. When mentioned SNF patient quickly says \"No I'm not going there. \" Discussed home care, she is interested in this. [address] list given. The Plan for Transition of Care is related to the following treatment goals: strengthening    The Patient was provided with a choice of provider and agrees   with the discharge plan. [x] Yes [] No    Freedom of choice list was provided with basic dialogue that supports the patient's individualized plan of care/goals, treatment preferences and shares the quality data associated with the providers. [x] Yes [] No    Plan to return home w/ HHC. Currently on 3-4L O2, monitor for home O2 needs. Possible discharge soon.     Electronically signed by Junior Barron RN Case Management [phone] on 1/21/2021 at 10:13 AM

## 2021-01-21 NOTE — PROGRESS NOTES
Hospitalist Progress Note      PCP: Ham Mcgraw MD    Date of Admission: 12/28/2020      Hospital Course: admitted with COVID19 PNA    Subjective:    Patient seen and examined. Patient is feeling better. However when working with therapy stand briefly with PT, on 6L SpO2 drops to low 80's with standing. Poor endurance.       Medications:  Reviewed    Infusion Medications    sodium chloride      dextrose       Scheduled Medications    predniSONE  40 mg Oral Daily    pantoprazole  40 mg Oral BID AC    furosemide  20 mg Intravenous Daily    sodium chloride (PF)  10 mL Intravenous Daily    sodium chloride flush  10 mL Intravenous 2 times per day    lidocaine 1 % injection  5 mL Intradermal Once    cefepime  2 g Intravenous Q12H    senna  1 tablet Oral Nightly    polyethylene glycol  17 g Oral Daily    budesonide-formoterol  2 puff Inhalation BID    [Held by provider] enoxaparin  40 mg Subcutaneous BID    amLODIPine  5 mg Oral Daily    [Held by provider] lisinopril  30 mg Oral Daily    [Held by provider] metoprolol tartrate  100 mg Oral BID    [Held by provider] aspirin  81 mg Oral Daily    levothyroxine  75 mcg Oral Daily    atorvastatin  40 mg Oral Daily    insulin lispro  0-6 Units Subcutaneous TID WC    insulin lispro  0-3 Units Subcutaneous Nightly    anastrozole  1 mg Oral Daily    DULoxetine  60 mg Oral Daily     PRN Meds: albuterol sulfate HFA **AND** ipratropium **AND** MDI Treatment, sodium chloride, sodium chloride flush, LORazepam, acetaminophen **OR** acetaminophen, glucose, dextrose, glucagon (rDNA), dextrose, ondansetron, guaiFENesin-dextromethorphan, sodium chloride      Intake/Output Summary (Last 24 hours) at 1/21/2021 1707  Last data filed at 1/21/2021 1301  Gross per 24 hour   Intake 630.28 ml   Output 300 ml   Net 330.28 ml       Physical Exam Performed:    /72   Pulse 114   Temp 98.4 °F (36.9 °C) (Oral)   Resp 20   Ht 5' 6\" (1.676 m)   Wt 178 lb 12.7 oz (81.1 kg)   SpO2 95%   BMI 28.86 kg/m²     General appearance: No apparent distress, alert and cooperative. HEENT: Conjunctivae/corneas clear, neck supple w/ full ROM  Respiratory:  Normal respiratory effort. Faint rales  Cardiovascular: Regular rate and rhythm, normal S1/S2, no murmurs  Abdomen: Soft, non-tender, non-distended with normal bowel sounds. Musculoskeletal: No edema bilaterally  Neurologic:  No new focal deficits  Psychiatric: Alert and oriented, normal insight  Capillary Refill: Brisk,< 3 seconds   Peripheral Pulses: +2 palpable, equal bilaterally       Labs:   Recent Labs     01/19/21  0445 01/20/21  0535 01/21/21  0525   WBC 10.7 14.3* 11.4*   HGB 7.5* 8.1* 7.5*   HCT 22.2* 24.4* 23.1*    233 239     Recent Labs     01/19/21  0446 01/19/21  1420 01/20/21  0535 01/21/21  0525     --  142 140   K 3.0* 3.6 3.6 3.0*     --  103 100   CO2 29  --  30 32   BUN 21*  --  23* 22*   CREATININE 0.8  --  0.7 0.7   CALCIUM 8.1*  --  8.3 8.4     Recent Labs     01/19/21  0446 01/20/21  0535 01/21/21  0525   AST 16 18 20   ALT 16 17 17   BILITOT <0.2 <0.2 <0.2   ALKPHOS 64 68 63     No results for input(s): INR in the last 72 hours. No results for input(s): Towana Trveor in the last 72 hours. Urinalysis:      Lab Results   Component Value Date    NITRU Negative 01/10/2021    WBCUA 153 01/10/2021    BACTERIA 4+ 01/10/2021    RBCUA 34 01/10/2021    BLOODU LARGE 01/10/2021    SPECGRAV 1.021 01/10/2021    GLUCOSEU Negative 01/10/2021       Radiology:  US ABDOMEN LIMITED   Final Result   No sonographic abnormality. XR CHEST PORTABLE   Final Result   Slight interval improvement in appearance of diffuse multifocal airspace   opacities, likely a combination of multifocal pneumonia and superimposed   pulmonary edema.          XR CHEST PORTABLE   Final Result   Stable diffuse bilateral lung disease compatible with pneumonia and/or edema         CT CHEST PULMONARY EMBOLISM W CONTRAST Final Result   No evidence of pulmonary embolism. Mildly dilated and atherosclerotic thoracic aorta with no aneurysm or   dissection. Extensive ground-glass opacities throughout both lungs some and subsegmental   bibasilar opacities which could represent extensive multisegmental   bronchopneumonia and/or pulmonary edema vs pulmonary hemorrhage. Small left pleural effusion. Small lymph nodes scattered in the mediastinum which are not pathologic by   size criteria. Moderate degenerative changes and postop changes in the spine      9 mm hypodensity left lobe liver which could represent a cyst but is   ill-defined. Suggest ultrasound correlation.          XR CHEST PORTABLE   Final Result   Bilateral ill-defined airspace opacities could represent atypical pneumonia,   multifocal bacterial pneumonia or subsegmental atelectasis                 Assessment/Plan:    Active Hospital Problems    Diagnosis    Pneumonia due to COVID-19 virus [U07.1, J12.82]    Hyperglycemia [R73.9]    Leukocytosis [D72.829]    Acute respiratory failure with hypoxia ([facility] [address] [J96.01]    2019 novel coronavirus-infected pneumonia ([facility]) [U07.1, J12.82]    Elevated LDH [R74.02]    Elevated AST (SGOT) [R74.01]    Elevated ferritin [R79.89]    Elevated d-dimer [R79.89]    History of depression [Z86.59]    Essential hypertension [I10]    Class 2 obesity due to excess calories with body mass index (BMI) of 35.0 to 35.9 in adult [E66.09, Z68.35]    Suspected COVID-19 virus infection [Z20.822]    Hypothyroidism [E03.9]    HLD (hyperlipidemia) [E78.5]    PCOS (polycystic ovarian syndrome) [E28.2]    Pneumonia due to organism [J18.9]   Acute hypoxic respiratory failure  - on 6 L NC - wean as tolerated to maintain SpO2 > 90%  - pt/ot - may need placement     COVID 19 PNA  - s/p remdesivir, convalescent palasma (12/30/2020)  - on prednisone 40 mg    Sepsis 2/2 Klebsiella UTI/bacteremia  - afebrile, leukocytosis resolved  - tachycardia    Klebsiella bacteremia  - completed cefepime yesterday  - repeat bl cx 1/13 ngtd    Klebsiella UTI  - abx as above    Oral thrush - completed fluconazole    UGIB - hematemsis and melena - resolved  - hold lovenox, d/c asa  - no further bleeding, H/H stable  - GI c/s - EGD deferred given respiratory status at this time    Acute blood loss anemia  - s/p 2 units prbc  - monitor, Hb stable    Liver lesion - 9 mm possible cyst, US -  negative    HTN - on Norvasc, at goal    Hx breast cancer - c/w arimidex    HLD  - c/w statin    Hypothyroidism  - c/w synthroid    DMT2  - hold metformin  - ssi   - a1c 6.0    Depression - c/w home meds    Hypokalemia - po replacement    DVT Prophylaxis: scds  Diet: DIET CARB CONTROL; Carb Control: 4 carb choices (60 gms)/meal  Dietary Nutrition Supplements: Diabetic Oral Supplement  Code Status: Full Code      Dispo - continue care, discharge in 1-2 days pending oxygen requirement - SNF vs Matthew Beltran MD

## 2021-01-21 NOTE — PROGRESS NOTES
Physical Therapy  Facility/Department: [address] PROGRESSIVE CARE  Daily Treatment Note  NAME: Rishi Prakash  : 1955  MRN: [id number]    Date of Service: 2021    Discharge Recommendations:  Continue to assess pending progress   PT Equipment Recommendations  Other: Will monitor for potential equipt needs. Assessment   Body structures, Functions, Activity limitations: Decreased functional mobility ; Decreased endurance  Assessment: Pt remains very limited in standing tolerance. On 6 L. O2, SPO2 decreased to 83% with brief standing trial, and pt could only tolerate 2 trials during session. HR routinely increased to 140s with brief standing activity, and pt does not currently have the stamina to complete self-care/mobility tasks at home. Recommend continued therapy to improve strength, endurance, balance, and independence ambulating. Pt will need to demonstrate improved stamina if she is going to return home. If her endurance does not improve, she may need to consider a bout of rehab prior to returning home. Rishi Prakash scored a 16/24 on the AM-PAC short mobility form. See above for discharge recommendations. If patient discharges prior to next session this note will serve as a discharge summary. Please see below for the latest assessment towards goals. PT Education: Goals; General Safety;PT Role;Orientation;Plan of Care;Gait Training  REQUIRES PT FOLLOW UP: Yes  Activity Tolerance  Activity Tolerance: Patient limited by endurance; Patient limited by fatigue     Patient Diagnosis(es): The primary encounter diagnosis was Pneumonia due to organism. A diagnosis of COVID-19 virus test result unknown was also pertinent to this visit. has a past medical history of Arthritis, Breast cancer ([facility] [address] Depression, Hypertension, Obesity, Peptic ulcer disease, Scoliosis, Skin cancer, and Thyroid disease. has a past surgical history that includes Breast biopsy; Breast lumpectomy; Hysterectomy;  Ovary removal; back surgery; Dilation and curettage of uterus; Carpal tunnel release; and Thyroid surgery. Restrictions  Restrictions/Precautions  Restrictions/Precautions: Isolation  Position Activity Restriction  Other position/activity restrictions: Droplet Plus COVID +. O2 6L via High Flow. Subjective   General  Additional Pertinent Hx: 62 y/o female admit 12/28/2020 with Pneumonia, COVID +. Hospital course complicated by GI Bleed (anticipate EGD when medical status warrants). PMH as noted including Breast Ca/Lumpect, Scoliosis, Back Surg. Referring Practitioner: Dr. Mariel Foy  Subjective: Pt sitting in chair. Agreeable to activity with encouragement. On 3 L. initially, - , SPO2 93%. O2 increased to 6 L. with activity per RT recommendation. Orientation  Orientation  Overall Orientation Status: Within Normal Limits     Objective      Transfers  Sit to Stand: Contact guard assistance  Stand to sit: Contact guard assistance     Ambulation  Ambulation?: No        Exercises  Comments: Static stand x 2 min. without UE support, SBA.  SPO2 decreased to 83%. HR to 148. (Pt on 6 L. O2). Other exercises  Other exercises?: Yes  Other exercises 1: March in place x 30 s., HR increased to 147. SPO2 down to 82% (pt on 6 L. O2). Other exercises 2: Seated: LAQ x 10, hip flex x 10. Other exercises 3: Seated: RUE/LUE cross-body reach x 10. Other Activities: Other (see comment)  Comment: After each bout of standing exercise, pt's SPO2 decreased to low 80s, and HR increased to high 140s, requiring 3-4 min. of seated rest and cues for pursed-lip breathing for SPO2 to rebound to 90s. PT noted that pt's standing tolerance is very poor at this time (can only tolerate 1-2 min. standing, 2x during session before being unable to complete further standing activity). She does not currently have the stamina to return home and complete self-care/ADLs.     AM-PAC Score  AM-PAC Inpatient Mobility Raw Score : 16 (01/21/21 1214)  AM-PAC Inpatient T-Scale Score : 40.78 (01/21/21 1214)  Mobility Inpatient CMS 0-100% Score: 54.16 (01/21/21 1214)  Mobility Inpatient CMS G-Code Modifier : CK (01/21/21 1214)          Goals  Short term goals  Time Frame for Short term goals: Upon d/c acute care setting. - ongoing 1/21  Short term goal 1: Transfers SBA/Supervision. Short term goal 2: Amb with/without assist device 48' SBA/Supervision. Patient Goals   Patient goals : I am going home when I leave this hospital.    Plan    Plan  Times per week: 3-5 x week while in acute care setting. Current Treatment Recommendations: Strengthening, Functional Mobility Training, Transfer Training, Gait Training, Safety Education & Training, Patient/Caregiver Education & Training  Safety Devices  Type of devices:  All fall risk precautions in place, Call light within reach, Chair alarm in place, Gait belt, Left in chair, Nurse notified  Restraints  Initially in place: No     Therapy Time   Individual Concurrent Group Co-treatment   Time In 1130         Time Out 1200         Minutes 30         Timed Code Treatment Minutes: [address], PT    Electronically signed by Neyda Ferrer, [place] [id number] on 1/21/2021 at 12:15 PM

## 2021-01-21 NOTE — PLAN OF CARE
Nutrition Problem #1: Increased nutrient needs  Intervention: Food and/or Nutrient Delivery: Continue Current Diet, Modify Oral Nutrition Supplement  Nutritional Goals: Meal and supplement intake greater than 50%

## 2021-01-21 NOTE — PROGRESS NOTES
Comprehensive Nutrition Assessment    Type and Reason for Visit:  Reassess    Nutrition Recommendations/Plan:   1. Continue carb control diet  2. Modify Ensure clear once daily to Glucerna BID  3. Document meal and supplement intakes in flowsheet    Nutrition Assessment:  Follow-up. Pt's nutrition status is improving. Pt's diet has been advanced to carb control since last assessment. Recorded PO intake >50% at meals and tolerating. Currently receiving clear ONS. Will modify to glucerna BID and monitor for tolerance. Malnutrition Assessment:  Malnutrition Status: At risk for malnutrition (Comment)    Context:  Acute Illness     Findings of the 6 clinical characteristics of malnutrition:  Energy Intake:  7 - 50% or less of estimated energy requirements for 5 or more days  Weight Loss:  No significant weight loss(Since admission, no wt hx given)     Body Fat Loss:  No significant body fat loss     Muscle Mass Loss:  No significant muscle mass loss    Fluid Accumulation:  No significant fluid accumulation     Strength:  Not Performed    Estimated Daily Nutrient Needs:  Energy (kcal):  2668-5995 (15-18kcals/88kg); Weight Used for Energy Requirements:  Admission     Protein (g):   (1.2-2.0g/59kg); Weight Used for Protein Requirements:  Ideal        Fluid (ml/day): 1 ml/kcal      Nutrition Related Findings:  Active BS. Trace BLE edema. K+ 3.0      Wounds:  Open Wounds, Moisture Associate Skin Damage       Anthropometric Measures:  · Height: 5' 6\" (167.6 cm)  · Current Body Weight: 178 lb (80.7 kg)   · Admission Body Weight: 193 lb (87.5 kg)    · Usual Body Weight: 215 lb (97.5 kg)(Since admission)     · Ideal Body Weight: 130 lbs; % Ideal Body Weight 136.9 %   · BMI: 28.7  · Adjusted Body Weight:  ; No Adjustment     · BMI Categories: Overweight (BMI 25.0-29. 9)       Nutrition Diagnosis:   · Increased nutrient needs related to increase demand for energy/nutrients as evidenced by wounds    Nutrition Interventions:   Food and/or Nutrient Delivery:  Continue Current Diet, Modify Oral Nutrition Supplement  Nutrition Education/Counseling:  Education not indicated   Coordination of Nutrition Care:  Continue to monitor while inpatient    Goals:  Meal and supplement intake greater than 50%       Nutrition Monitoring and Evaluation:   Behavioral-Environmental Outcomes:  None Identified   Food/Nutrient Intake Outcomes:  Food and Nutrient Intake, Supplement Intake  Physical Signs/Symptoms Outcomes:  Biochemical Data, GI Status, Weight, Skin     Discharge Planning:    No discharge needs at this time     Electronically signed by Heron Noe RD, LD on 1/21/21 at 9:12 AM EST    Contact: [phone]

## 2021-01-21 NOTE — PLAN OF CARE
Problem: Pain:  Goal: Pain level will decrease  Description: Pain level will decrease  1/21/2021 0026 by Tita Ulloa RN  Outcome: Ongoing  1/20/2021 1538 by Stefania Clark RN  Outcome: Ongoing  Note: Pt has had no complaints of pain. Pt has had no outward signs of pain. Goal: Control of acute pain  Description: Control of acute pain  1/21/2021 0026 by Tita Ulloa RN  Outcome: Ongoing  1/20/2021 1538 by Stefania Clark RN  Outcome: Ongoing  Note: Pain/discomfort being managed with PRN analgesics per MD orders. Pt able to express presence and absence of pain and rate pain appropriately using numerical scale. Goal: Control of chronic pain  Description: Control of chronic pain  1/21/2021 0026 by Tita lUloa RN  Outcome: Ongoing  1/20/2021 1538 by Stefania Clark RN  Outcome: Ongoing  Note: . Alert and oriented x4 Dyspnic with minimal exertion. Sats. WNL on 5L O2 Uses nonrebreather when up to [facility]. Lungs diminished Cough and deep breathing exercises encouraged.  No c/o pain Free from fall/injury

## 2021-01-21 NOTE — PLAN OF CARE
Problem: Pain:  Description: Pain management should include both nonpharmacologic and pharmacologic interventions. Goal: Pain level will decrease  Description: Pain level will decrease  Outcome: Ongoing  Note: Pt did complain of discomfort on her butt earlier in the shift, she was sitting on an ice pack. Problem: Pain:  Description: Pain management should include both nonpharmacologic and pharmacologic interventions. Goal: Control of acute pain  Description: Control of acute pain  Outcome: Ongoing  Note: Pain/discomfort being managed with PRN analgesics per MD orders. Pt able to express presence and absence of pain and rate pain appropriately using numerical scale. Problem: Pain:  Description: Pain management should include both nonpharmacologic and pharmacologic interventions. Goal: Control of chronic pain  Description: Control of chronic pain  Outcome: Ongoing  Note: Pain/discomfort being managed with PRN analgesics per MD orders. Pt able to express presence and absence of pain and rate pain appropriately using numerical scale. Problem: Airway Clearance - Ineffective  Goal: Achieve or maintain patent airway  Outcome: Ongoing  Note: Pt remains in isolation for COVID     Problem: Gas Exchange - Impaired  Goal: Absence of hypoxia  Outcome: Ongoing  Note: Pt remains on continuous pulse ox. Slowly weaning oxygen via nasal canula. Problem: Gas Exchange - Impaired  Goal: Promote optimal lung function  Outcome: Ongoing  Note: Lung sounds are diminished throughout. Problem: Breathing Pattern - Ineffective  Goal: Ability to achieve and maintain a regular respiratory rate  Outcome: Ongoing  Note: Respirations easy even at rest. She does complain of shortness of breath with activity. Problem: Body Temperature -  Risk of, Imbalanced  Goal: Ability to maintain a body temperature within defined limits  Outcome: Ongoing  Note: Temperature checked every 4 hours and prn. Problem:  Body Temperature -  Risk of, Imbalanced  Goal: Will regain or maintain usual level of consciousness  Outcome: Ongoing  Note: Pt is back to baseline. Alert and oriented able to make needs known. Problem: Body Temperature -  Risk of, Imbalanced  Goal: Complications related to the disease process, condition or treatment will be avoided or minimized  Outcome: Ongoing  Note: . Problem: Isolation Precautions - Risk of Spread of Infection  Goal: Prevent transmission of infection  Outcome: Ongoing  Note: . Problem: Nutrition Deficits  Goal: Optimize nutrtional status  Outcome: Ongoing  Note: Appetite is good. Tolerating diet well. Problem: Risk for Fluid Volume Deficit  Goal: Maintain normal heart rhythm  Outcome: Ongoing  Note: Remains on telemetry monitor. Heart rate is SR to ST with activity. Problem: Risk for Fluid Volume Deficit  Goal: Maintain absence of muscle cramping  Outcome: Ongoing  Note: No complaints of muscle cramping. Problem: Risk for Fluid Volume Deficit  Goal: Maintain normal serum potassium, sodium, calcium, phosphorus, and pH  Outcome: Ongoing  Note: Patient's potasium level was low today she did get oral potassium as replacement. Problem: Loneliness or Risk for Loneliness  Goal: Demonstrate positive use of time alone when socialization is not possible  Outcome: Ongoing  Note: . Problem: Fatigue  Goal: Verbalize increase energy and improved vitality  Outcome: Ongoing  Note: . Problem: Patient Education: Go to Patient Education Activity  Goal: Patient/Family Education  Outcome: Ongoing  Note: . Problem: Falls - Risk of:  Goal: Will remain free from falls  Description: Will remain free from falls  Outcome: Ongoing  Note: Pt is compliant with calling for help prior to getting out of bed. Problem: Falls - Risk of:  Goal: Absence of physical injury  Description: Absence of physical injury  Outcome: Ongoing  Note: No signs of physical injury.      Problem: Nutrition  Goal: Optimal nutrition therapy  1/21/2021 1554 by Esther Alegre RN  Outcome: Ongoing  Note: . Problem: Skin Integrity:  Goal: Will show no infection signs and symptoms  Description: Will show no infection signs and symptoms  Outcome: Ongoing  Note: Skin assessment completed every shift. Pt assessed for incontinence, appropriate barrier cream applied prn. Pt encouraged to turn/rotate every 2 hours. Assistance provided if pt unable to do so themselves. Problem: Skin Integrity:  Goal: Absence of new skin breakdown  Description: Absence of new skin breakdown  Outcome: Ongoing  Note: No signs of new skin breakdown noted. Problem: Discharge Planning:  Goal: Discharged to appropriate level of care  Description: Discharged to appropriate level of care  Outcome: Ongoing  Note: No discharge plans are in place. Problem: Bowel Function - Altered:  Goal: Bowel elimination is within specified parameters  Description: Bowel elimination is within specified parameters  Outcome: Ongoing  Note: . Problem: Fluid Volume - Imbalance:  Description: Avoid the routine use of orogastric or nasogastric lavage. Goal: Absence of imbalanced fluid volume signs and symptoms  Description: Absence of imbalanced fluid volume signs and symptoms  Outcome: Ongoing  Note: . Problem: Fluid Volume - Imbalance:  Description: Avoid the routine use of orogastric or nasogastric lavage. Goal: Will show no signs and symptoms of excessive bleeding  Description: Will show no signs and symptoms of excessive bleeding  Outcome: Ongoing  Note: No signs of bleeding noted.

## 2021-01-22 LAB
ANION GAP SERPL CALCULATED.3IONS-SCNC: 7 MMOL/L (ref 3–16)
BUN BLDV-MCNC: 21 MG/DL (ref 7–20)
CALCIUM SERPL-MCNC: 8.4 MG/DL (ref 8.3–10.6)
CHLORIDE BLD-SCNC: 103 MMOL/L (ref 99–110)
CO2: 31 MMOL/L (ref 21–32)
CREAT SERPL-MCNC: 0.7 MG/DL (ref 0.6–1.2)
GFR AFRICAN AMERICAN: >60
GFR NON-AFRICAN AMERICAN: >60
GLUCOSE BLD-MCNC: 112 MG/DL (ref 70–99)
GLUCOSE BLD-MCNC: 139 MG/DL (ref 70–99)
GLUCOSE BLD-MCNC: 147 MG/DL (ref 70–99)
GLUCOSE BLD-MCNC: 232 MG/DL (ref 70–99)
GLUCOSE BLD-MCNC: 89 MG/DL (ref 70–99)
PERFORMED ON: ABNORMAL
POTASSIUM SERPL-SCNC: 3.4 MMOL/L (ref 3.5–5.1)
SODIUM BLD-SCNC: 141 MMOL/L (ref 136–145)

## 2021-01-22 PROCEDURE — 97530 THERAPEUTIC ACTIVITIES: CPT

## 2021-01-22 PROCEDURE — 94761 N-INVAS EAR/PLS OXIMETRY MLT: CPT

## 2021-01-22 PROCEDURE — 6370000000 HC RX 637 (ALT 250 FOR IP): Performed by: FAMILY MEDICINE

## 2021-01-22 PROCEDURE — 97535 SELF CARE MNGMENT TRAINING: CPT

## 2021-01-22 PROCEDURE — 6370000000 HC RX 637 (ALT 250 FOR IP): Performed by: INTERNAL MEDICINE

## 2021-01-22 PROCEDURE — 6370000000 HC RX 637 (ALT 250 FOR IP): Performed by: STUDENT IN AN ORGANIZED HEALTH CARE EDUCATION/TRAINING PROGRAM

## 2021-01-22 PROCEDURE — 2580000003 HC RX 258: Performed by: INTERNAL MEDICINE

## 2021-01-22 PROCEDURE — 2060000000 HC ICU INTERMEDIATE R&B

## 2021-01-22 PROCEDURE — 6360000002 HC RX W HCPCS: Performed by: INTERNAL MEDICINE

## 2021-01-22 PROCEDURE — 2700000000 HC OXYGEN THERAPY PER DAY

## 2021-01-22 PROCEDURE — 80048 BASIC METABOLIC PNL TOTAL CA: CPT

## 2021-01-22 PROCEDURE — 94680 O2 UPTK RST&XERS DIR SIMPLE: CPT

## 2021-01-22 PROCEDURE — 97110 THERAPEUTIC EXERCISES: CPT

## 2021-01-22 RX ORDER — POLYETHYLENE GLYCOL 3350 17 G/17G
17 POWDER, FOR SOLUTION ORAL DAILY
Qty: 30 EACH | Refills: 0 | Status: SHIPPED | OUTPATIENT
Start: 2021-01-23 | End: 2021-02-22

## 2021-01-22 RX ORDER — POTASSIUM CHLORIDE 20 MEQ/1
20 TABLET, EXTENDED RELEASE ORAL ONCE
Status: COMPLETED | OUTPATIENT
Start: 2021-01-22 | End: 2021-01-22

## 2021-01-22 RX ORDER — BUDESONIDE AND FORMOTEROL FUMARATE DIHYDRATE 160; 4.5 UG/1; UG/1
2 AEROSOL RESPIRATORY (INHALATION) 2 TIMES DAILY
Qty: 1 INHALER | Refills: 0 | Status: SHIPPED | OUTPATIENT
Start: 2021-01-22 | End: 2021-06-22

## 2021-01-22 RX ORDER — ALBUTEROL SULFATE 90 UG/1
2 AEROSOL, METERED RESPIRATORY (INHALATION) EVERY 4 HOURS PRN
Qty: 1 INHALER | Refills: 0 | Status: SHIPPED | OUTPATIENT
Start: 2021-01-22 | End: 2021-06-22

## 2021-01-22 RX ORDER — AMLODIPINE BESYLATE 5 MG/1
2.5 TABLET ORAL DAILY
Qty: 30 TABLET | Refills: 0 | Status: SHIPPED | OUTPATIENT
Start: 2021-01-22

## 2021-01-22 RX ORDER — SENNA PLUS 8.6 MG/1
1 TABLET ORAL NIGHTLY
Qty: 30 TABLET | Refills: 0 | Status: SHIPPED | OUTPATIENT
Start: 2021-01-22 | End: 2021-02-21

## 2021-01-22 RX ORDER — PREDNISONE 20 MG/1
20 TABLET ORAL DAILY
Qty: 3 TABLET | Refills: 0 | Status: SHIPPED | OUTPATIENT
Start: 2021-01-23 | End: 2021-01-26

## 2021-01-22 RX ORDER — PANTOPRAZOLE SODIUM 40 MG/1
40 TABLET, DELAYED RELEASE ORAL
Qty: 60 TABLET | Refills: 0 | Status: SHIPPED | OUTPATIENT
Start: 2021-01-22 | End: 2021-06-22

## 2021-01-22 RX ADMIN — CEFEPIME HYDROCHLORIDE 2 G: 2 INJECTION, POWDER, FOR SOLUTION INTRAVENOUS at 00:43

## 2021-01-22 RX ADMIN — AMLODIPINE BESYLATE 5 MG: 5 TABLET ORAL at 08:04

## 2021-01-22 RX ADMIN — ATORVASTATIN CALCIUM 40 MG: 40 TABLET, FILM COATED ORAL at 08:04

## 2021-01-22 RX ADMIN — INSULIN LISPRO 2 UNITS: 100 INJECTION, SOLUTION INTRAVENOUS; SUBCUTANEOUS at 17:06

## 2021-01-22 RX ADMIN — PANTOPRAZOLE SODIUM 40 MG: 40 TABLET, DELAYED RELEASE ORAL at 17:11

## 2021-01-22 RX ADMIN — POTASSIUM CHLORIDE 20 MEQ: 1500 TABLET, EXTENDED RELEASE ORAL at 12:44

## 2021-01-22 RX ADMIN — Medication 10 ML: at 08:05

## 2021-01-22 RX ADMIN — LORAZEPAM 0.5 MG: 0.5 TABLET ORAL at 22:07

## 2021-01-22 RX ADMIN — DULOXETINE HYDROCHLORIDE 60 MG: 60 CAPSULE, DELAYED RELEASE ORAL at 08:04

## 2021-01-22 RX ADMIN — PANTOPRAZOLE SODIUM 40 MG: 40 TABLET, DELAYED RELEASE ORAL at 06:26

## 2021-01-22 RX ADMIN — INSULIN LISPRO 1 UNITS: 100 INJECTION, SOLUTION INTRAVENOUS; SUBCUTANEOUS at 21:58

## 2021-01-22 RX ADMIN — SODIUM CHLORIDE, PRESERVATIVE FREE 10 ML: 5 INJECTION INTRAVENOUS at 21:59

## 2021-01-22 RX ADMIN — PREDNISONE 40 MG: 20 TABLET ORAL at 08:04

## 2021-01-22 RX ADMIN — ACETAMINOPHEN 650 MG: 325 TABLET ORAL at 22:10

## 2021-01-22 RX ADMIN — CEFEPIME HYDROCHLORIDE 2 G: 2 INJECTION, POWDER, FOR SOLUTION INTRAVENOUS at 12:44

## 2021-01-22 RX ADMIN — SODIUM CHLORIDE, PRESERVATIVE FREE 10 ML: 5 INJECTION INTRAVENOUS at 08:04

## 2021-01-22 RX ADMIN — ANASTROZOLE 1 MG: 1 TABLET ORAL at 08:04

## 2021-01-22 RX ADMIN — Medication 2 PUFF: at 23:19

## 2021-01-22 RX ADMIN — POLYETHYLENE GLYCOL 3350 17 G: 17 POWDER, FOR SOLUTION ORAL at 08:03

## 2021-01-22 RX ADMIN — Medication 2 PUFF: at 08:06

## 2021-01-22 RX ADMIN — LEVOTHYROXINE SODIUM 75 MCG: 0.07 TABLET ORAL at 06:26

## 2021-01-22 RX ADMIN — METOPROLOL TARTRATE 25 MG: 25 TABLET, FILM COATED ORAL at 17:11

## 2021-01-22 ASSESSMENT — PAIN DESCRIPTION - LOCATION: LOCATION: BUTTOCKS

## 2021-01-22 ASSESSMENT — PAIN SCALES - WONG BAKER
WONGBAKER_NUMERICALRESPONSE: 0

## 2021-01-22 ASSESSMENT — PAIN DESCRIPTION - DESCRIPTORS: DESCRIPTORS: ACHING;BURNING

## 2021-01-22 ASSESSMENT — PAIN - FUNCTIONAL ASSESSMENT: PAIN_FUNCTIONAL_ASSESSMENT: ACTIVITIES ARE NOT PREVENTED

## 2021-01-22 ASSESSMENT — PAIN DESCRIPTION - ONSET: ONSET: ON-GOING

## 2021-01-22 ASSESSMENT — PAIN DESCRIPTION - FREQUENCY: FREQUENCY: CONTINUOUS

## 2021-01-22 ASSESSMENT — PAIN SCALES - GENERAL: PAINLEVEL_OUTOF10: 3

## 2021-01-22 NOTE — PROGRESS NOTES
Occupational Therapy  Facility/Department: [address] PROGRESSIVE CARE  Daily Treatment Note  NAME: Herbie Dodge  : 1955  MRN: [id number]    Date of Service: 2021    Discharge Recommendations:  24 hour supervision or assist, S Level 3, Home with Home health OT  OT Equipment Recommendations  Equipment Needed: Yes  Mobility Devices: ADL Assistive Devices  ADL Assistive Devices: Toileting - Standard Commode; Shower Chair with back  Other: pt may benefit from [facility] and shower chair to increase independence and safety with ADLs d/t significantly decreased activity tolerance  Herbie Dodge scored a 19/24 on the AM-PAC ADL Inpatient form. Current research shows that an AM-PAC score of 18 or greater is typically associated with a discharge to the patient's home setting. Based on the patient's AM-PAC score, and their current ADL deficits, it is recommended that the patient have 2-3 sessions per week of Occupational Therapy at d/c to increase the patient's independence. At this time, this patient demonstrates the endurance and safety to discharge home with home OT and a follow up treatment frequency of 2-3x/wk. Please see assessment section for further patient specific details. If patient discharges prior to next session this note will serve as a discharge summary. Please see below for the latest assessment towards goals. Assessment   Performance deficits / Impairments: Decreased endurance;Decreased ADL status  Assessment: Pt tolerated tx session fair, though limited by decreased activity and self-limiting behavior. Pt completed stand pivot transfer <> BSC with SBA without device. Pt completed toileting with SBA, though required significantly extra time for all tasks. Pt's O2 sats dropped to 85% and her HR increased to 145-150 bpm during toileting. Pt unable to tolerate further activity at this time d/t high irregular HR. Pt is adamant to d/c home and with limited insight to her deficits/situation.  If pt goes home she will require 24/7 supervision, level 3 home OT, a BSC, and a shower chair to ensure pt's safety in the home. Cont per POC  Prognosis: Fair  OT Education: OT Role;Plan of Care;Energy Conservation; ADL Adaptive Strategies;Transfer Training  Barriers to Learning: insight  REQUIRES OT FOLLOW UP: Yes  Activity Tolerance  Activity Tolerance: Patient limited by fatigue;Treatment limited secondary to medical complications (free text)  Activity Tolerance: pt desatted to mid 80s with activity and HR up to 145-150s during toileting  Safety Devices  Safety Devices in place: Yes  Type of devices: Call light within reach; Left in chair;Nurse notified; Chair alarm in place;Gait belt;Patient at risk for falls         Patient Diagnosis(es): The primary encounter diagnosis was Pneumonia due to organism. A diagnosis of COVID-19 virus test result unknown was also pertinent to this visit. has a past medical history of Arthritis, Breast cancer ([address] COVID-19, Depression, Hypertension, Obesity, Peptic ulcer disease, Scoliosis, Skin cancer, and Thyroid disease. has a past surgical history that includes Breast biopsy; Breast lumpectomy; Hysterectomy; Ovary removal; back surgery; Dilation and curettage of uterus; Carpal tunnel release; and Thyroid surgery. Restrictions  Restrictions/Precautions  Restrictions/Precautions: Isolation  Position Activity Restriction  Other position/activity restrictions: Droplet Plus COVID +. O2 4L via High Flow.   Subjective   General  Chart Reviewed: Yes  Patient assessed for rehabilitation services?: Yes  Additional Pertinent Hx: per H&P: \"The patient is a 63 y.o. female with past medical history of breast cancer currently on treatment with anastrozole, hypertension, polycystic ovarian syndrome using Metformin and not diabetic, hypothyroidism, depression who presents to [facility] from neighboring ED for 2 to 3-day history of above symptoms of nonproductive cough with concurrent sore throat and right ear fullness as well as just feeling off in terms of energy for the past few days. Patient overall remarks that she has not felt acutely in any respiratory distress but has felt somewhat more tired with exertion and possibly short of breath from that point of view. Patient otherwise denies any other symptoms of fever, chills, nausea/vomiting/diarrhea, dysuria, rashes, chest pain, dizziness, syncope. She has noted some lightheadedness in addition to the sore throat and cough as well as this fatigue with exertion and possible shortness of breath. She denies any smoking or any other use of drugs, she also denies any sick contacts of note according to her. Currently in the ED she was found to be 88% on room air and was started on oxygen therapy but was eventually needing higher levels to about 6 L of nasal cannula oxygen. \"  Family / Caregiver Present: No  Referring Practitioner: Marybeth Ferreira MD  Diagnosis: COVID-19  Subjective  Subjective: pt met b/s for OT. pt in recliner, agreeable to therapy. MD in room discussing d/c plans with pt, pt continues to be adamant to return home at d/c  General Comment  Comments: RN cleared pt for therapy.  RN reports pt is always up in the chair and buttocks is beginning to becoming excoriated      Orientation     Objective    ADL  LE Dressing: Stand by assistance(pt changed briefs)  Toileting: Stand by assistance(pt voided urine using BSC, performed pericare and managed briefs in standing though required significantly extra time for all)        Balance  Sitting Balance: Supervision  Standing Balance: Stand by assistance  Functional Mobility  Functional - Mobility Device: No device  Activity: Other  Assist Level: Stand by assistance  Functional Mobility Comments: pt completed stand pivot transfer from recliner <> BSC SBA, able to manage O2 line with cues  Toilet Transfers  Toilet - Technique: Stand pivot  Equipment Used: Standard bedside commode  Toilet Transfer: Stand by assistance  Toilet Transfers Comments: pt able to manage O2 line with cues     Transfers  Sit to stand: Stand by assistance  Stand to sit: Stand by assistance                       Cognition  Cognition Comment: Diminished insight/motivation.                                          Plan   Plan  Times per week: 3-5  Times per day: Daily  Current Treatment Recommendations: Strengthening, Endurance Training, Patient/Caregiver Education & Training, Equipment Evaluation, Education, & procurement, ROM, Balance Training, Functional Mobility Training, Safety Education & Training, Self-Care / ADL    AM-PAC Score        AM-PAC Inpatient Daily Activity Raw Score: 19 (01/22/21 1311)  AM-PAC Inpatient ADL T-Scale Score : 40.22 (01/22/21 1311)  ADL Inpatient CMS 0-100% Score: 42.8 (01/22/21 1311)  ADL Inpatient CMS G-Code Modifier : CK (01/22/21 1311)    Goals  Short term goals  Time Frame for Short term goals: prior to d/c  Short term goal 1: Pt will complete fxl transfers independently  Short term goal 2: Pt will complete fxl mobility independently  Short term goal 3: Pt will toilet independently  Short term goal 4: Pt will participate in B UE HEP in order to increase strength and endurance for ADLs  Short term goal 5: Pt will tolerate x5 mins of standing activity with O2 sats remaining above 90%  Long term goals  Time Frame for Long term goals : LTG=STG  Patient Goals   Patient goals : to go home       Therapy Time   Individual Concurrent Group Co-treatment   Time In [facility]         Time Out [address]         Minutes [address] [facility], [place], OTR/L [address]

## 2021-01-22 NOTE — PROGRESS NOTES
Learning: Endurance. REQUIRES PT FOLLOW UP: Yes  Activity Tolerance  Activity Tolerance: Patient limited by endurance; Patient limited by fatigue     Patient Diagnosis(es): The primary encounter diagnosis was Pneumonia due to organism. A diagnosis of COVID-19 virus test result unknown was also pertinent to this visit. has a past medical history of Arthritis, Breast cancer ([facility] [address] COVID-19, Depression, Hypertension, Obesity, Peptic ulcer disease, Scoliosis, Skin cancer, and Thyroid disease. has a past surgical history that includes Breast biopsy; Breast lumpectomy; Hysterectomy; Ovary removal; back surgery; Dilation and curettage of uterus; Carpal tunnel release; and Thyroid surgery. Restrictions  Restrictions/Precautions  Restrictions/Precautions: Isolation  Position Activity Restriction  Other position/activity restrictions: Droplet Plus COVID +. O2 4L via High Flow. Subjective   General  Chart Reviewed: Yes  Additional Pertinent Hx: 64 y/o female admit 12/28/2020 with Pneumonia, COVID +. Hospital course complicated by GI Bleed (anticipate EGD when medical status warrants). PMH as noted including Breast Ca/Lumpect, Scoliosis, Back Surg. Subjective  Subjective: Pt sitting in chair. Agreeable to activity with encouragement. On 4 L. initially, SPO2 99%. O2 increased to 6 L. with activity per RT recommendation. Orientation  Orientation  Overall Orientation Status: Within Normal Limits    Objective      Bed mobility  Supine to Sit: Supervision  Sit to Supine: Supervision     Transfers  Sit to Stand: Contact guard assistance(Cues for hand placement)  Stand to sit: Contact guard assistance(Cues for hand placement)     Ambulation  Ambulation?: Yes  Ambulation 1  Surface: level tile  Device: Rolling Walker  Assistance: Contact guard assistance  Quality of Gait: Slow speed, progressive SOB, limited d/t fatigue and dizziness, but no overt LOB.   Distance: 10' x 2, 15' x 2  Comments: Pt desaturated to high 80s after 2nd round of ambulation, requiring several minutes of seated rest to rebound. HR ranged from 110-145 with activity. Exercises  Comments: Static stand x 1 min. without UE support, intermittent posterior LOB, Min A to correct. SPO2 stable in 90s throughout. Other Activities: Other (see comment)  Comment: Pt educated on goals of therapy (improving strength, stamina, balance, and independence ambulating to facilitate return home). She is adamant that she will not be going to a SNF - had a bad experience with her mother there. PT educated pt that if she returns home she will require significant assist for self-care/ADLs (nursing staff is currently providing assist for dressing, bathing, and toileting), and pt requires extended seated rest after brief ambulation trials d/t SOB and fatigue. She acknowledge this, stating her son and DIL will be there to help her. She is also interested in home therapy. AM-PAC Score  AM-PAC Inpatient Mobility Raw Score : 17 (01/22/21 0919)  AM-PAC Inpatient T-Scale Score : 42.13 (01/22/21 0919)  Mobility Inpatient CMS 0-100% Score: 50.57 (01/22/21 0919)  Mobility Inpatient CMS G-Code Modifier : CK (01/22/21 0919)          Goals  Short term goals  Time Frame for Short term goals: Upon d/c acute care setting. - ongoing 1/22  Short term goal 1: Transfers SBA/Supervision. Short term goal 2: Amb with/without assist device 48' SBA/Supervision. Patient Goals   Patient goals : I am going home when I leave this hospital.    Plan    Plan  Times per week: 3-5 x week while in acute care setting. Current Treatment Recommendations: Strengthening, Functional Mobility Training, Transfer Training, Gait Training, Safety Education & Training, Patient/Caregiver Education & Training  Safety Devices  Type of devices:  All fall risk precautions in place, Call light within reach, Chair alarm in place, Gait belt, Left in chair, Nurse notified  Restraints  Initially in place: No Therapy Time   Individual Concurrent Group Co-treatment   Time In 0820         Time Out 5424         Minutes 45         Timed Code Treatment Minutes: [address]       Khloe Whitley PT    Electronically signed by Khloe Whitley, DAVION [id number] on 1/22/2021 at 9:20 AM

## 2021-01-22 NOTE — PROGRESS NOTES
Pt informed that she would need to lay in the bed in order for RN to remove PICC. PT responded that \"I'm too tired and I need to sleep. ..come back later\". Pt informed that she is being discharged today. Pt responded that \"my son is concerned about my heart rate so I can't go home\". RN in room when pt received call from social work to discuss discharge- pt hung up phone and made several remarks about \"you guys are just tossing me out of here. I'm being rushed\". Pt reassured that she will have everything she needs prior to leaving.

## 2021-01-22 NOTE — CARE COORDINATION
Patient has appealed discharge. Patient tells me her HR becomes too elevated with activity and she doesn't feel ready for discharge. All paperwork sent to Hiral Vasquez at 1/22 around 4pm. Appeal [id number].  Electronically signed by Marilu Felipe RN Case Management [phone] on 1/22/2021 at 4:21 PM

## 2021-01-22 NOTE — CARE COORDINATION
Ila to discharge to home today w/ [facility]. Spoke w/ patient's son Vanita Burkitt and her sister in law Marquita Beasley who are both aware of discharge home today. [facility] has accepted patient, aware of discharge today. Myla from [facility] will pull orders from [facility]. Linsey Reddy from [facility] made aware of all DME orders and needs. Sister in law Marquita Beasley purchased pulse ox over the phone and this is being placed in patient's retail pharmacy med bag. Per previous note Vanita Burkitt looking into private duty care, temporarily they are working on having family try to stay 24/7 w/ patient. Unsure if patient would qualify for additional services through [place] [facility], called [phone] , they will reach out to patient on Monday. Family to transport home. Electronically signed by Brady Post RN Case Management [phone] on 1/22/2021 at 2:06 PM     Bedside Rn calls me saying patient now refusing to leave. Says her heart rate is increased and she doesn't feel she is stable for discharge. Talked with patient, who confirms she doesn't think she is ready to discharge today. Reviewed IMM w/ her earlier, she had no plans to refuse discharge then. Patient thinks maybe [facility] but says depends on her heart rate. HR gets elevated with any exertion and worries patient. Discussed her right to appeal discharge. IMM reviewed again. Patient says she will call Soniya Monsivais for appeal. FYI - DME delivered to patient at bedside including oxygen. Per Linsey Reddy this O2 is good for [facility] as well. If she leaves after [facility] must get new O2 eval and DME orders.   Electronically signed by Brady Post RN on 1/22/2021 at 2:32 PM

## 2021-01-22 NOTE — CARE COORDINATION
Spoke with patient over the phone. She plans on returning home at discharge. She says she won't try to be \"superman\" at home and says she'll have someone with her at all times. Patient reviewed [facility] list given yesterday. She would like [facility]. Referral made. She also gave approval for me to speak with her son Halina Sanon. Spoke w/Randy who had concerns about his mother returning home. We discussed her getting [facility] and he requested if there was any additional assistance he could get. Discussed private duty, he was interested in this. Emailed private duty list to [email or web address] Johnson Welch made aware of possible discharge soon depending on patient's oxygen requirements.  Electronically signed by Claudette Chowdhury RN Case Management [phone] on 1/22/2021 at 9:57 AM

## 2021-01-22 NOTE — PROGRESS NOTES
Hospitalist Progress Note      PCP: John Buitrago MD    Date of Admission: 12/28/2020      Hospital Course: admitted with COVID19 PNA    Subjective:    Patient seen and examined. Patient is feeling better. Poor endurance but starting to improve. Refusing SNF placement for rehab, adamant about going home, however when tried to [address] today, patient refused and states she needs time to have her family ready for her to come home.      Medications:  Reviewed    Infusion Medications    sodium chloride      dextrose       Scheduled Medications    metoprolol tartrate  25 mg Oral BID    predniSONE  40 mg Oral Daily    pantoprazole  40 mg Oral BID AC    sodium chloride (PF)  10 mL Intravenous Daily    sodium chloride flush  10 mL Intravenous 2 times per day    lidocaine 1 % injection  5 mL Intradermal Once    cefepime  2 g Intravenous Q12H    senna  1 tablet Oral Nightly    polyethylene glycol  17 g Oral Daily    budesonide-formoterol  2 puff Inhalation BID    amLODIPine  5 mg Oral Daily    levothyroxine  75 mcg Oral Daily    atorvastatin  40 mg Oral Daily    insulin lispro  0-6 Units Subcutaneous TID WC    insulin lispro  0-3 Units Subcutaneous Nightly    anastrozole  1 mg Oral Daily    DULoxetine  60 mg Oral Daily     PRN Meds: albuterol sulfate HFA **AND** ipratropium **AND** MDI Treatment, sodium chloride, sodium chloride flush, LORazepam, acetaminophen **OR** acetaminophen, glucose, dextrose, glucagon (rDNA), dextrose, ondansetron, guaiFENesin-dextromethorphan, sodium chloride      Intake/Output Summary (Last 24 hours) at 1/22/2021 1646  Last data filed at 1/22/2021 0600  Gross per 24 hour   Intake 240 ml   Output --   Net 240 ml       Physical Exam Performed:    /78   Pulse 132   Temp 97.9 °F (36.6 °C) (Oral)   Resp 22   Ht 5' 6\" (1.676 m)   Wt 176 lb 2.4 oz (79.9 kg)   SpO2 100%   BMI 28.43 kg/m²     General appearance: No apparent distress, alert and cooperative. HEENT: Conjunctivae/corneas clear, neck supple w/ full ROM  Respiratory:  Normal respiratory effort. Faint rales  Cardiovascular: Regular rate and rhythm, normal S1/S2, no murmurs  Abdomen: Soft, non-tender, non-distended with normal bowel sounds. Musculoskeletal: No edema bilaterally  Neurologic:  No new focal deficits  Psychiatric: Alert and oriented, normal insight  Capillary Refill: Brisk,< 3 seconds   Peripheral Pulses: +2 palpable, equal bilaterally       Labs:   Recent Labs     01/20/21  0535 01/21/21  0525   WBC 14.3* 11.4*   HGB 8.1* 7.5*   HCT 24.4* 23.1*    239     Recent Labs     01/20/21  0535 01/21/21  0525 01/22/21  0625    140 141   K 3.6 3.0* 3.4*    100 103   CO2 30 32 31   BUN 23* 22* 21*   CREATININE 0.7 0.7 0.7   CALCIUM 8.3 8.4 8.4     Recent Labs     01/20/21  0535 01/21/21  0525   AST 18 20   ALT 17 17   BILITOT <0.2 <0.2   ALKPHOS 68 63     No results for input(s): INR in the last 72 hours. No results for input(s): Too Blood in the last 72 hours. Urinalysis:      Lab Results   Component Value Date    NITRU Negative 01/10/2021    WBCUA 153 01/10/2021    BACTERIA 4+ 01/10/2021    RBCUA 34 01/10/2021    BLOODU LARGE 01/10/2021    SPECGRAV 1.021 01/10/2021    GLUCOSEU Negative 01/10/2021       Radiology:  US ABDOMEN LIMITED   Final Result   No sonographic abnormality. XR CHEST PORTABLE   Final Result   Slight interval improvement in appearance of diffuse multifocal airspace   opacities, likely a combination of multifocal pneumonia and superimposed   pulmonary edema. XR CHEST PORTABLE   Final Result   Stable diffuse bilateral lung disease compatible with pneumonia and/or edema         CT CHEST PULMONARY EMBOLISM W CONTRAST   Final Result   No evidence of pulmonary embolism. Mildly dilated and atherosclerotic thoracic aorta with no aneurysm or   dissection.       Extensive ground-glass opacities throughout both lungs some and subsegmental   bibasilar opacities which could represent extensive multisegmental   bronchopneumonia and/or pulmonary edema vs pulmonary hemorrhage. Small left pleural effusion. Small lymph nodes scattered in the mediastinum which are not pathologic by   size criteria. Moderate degenerative changes and postop changes in the spine      9 mm hypodensity left lobe liver which could represent a cyst but is   ill-defined. Suggest ultrasound correlation. XR CHEST PORTABLE   Final Result   Bilateral ill-defined airspace opacities could represent atypical pneumonia,   multifocal bacterial pneumonia or subsegmental atelectasis                 Assessment/Plan:    Active Hospital Problems    Diagnosis    Pneumonia due to COVID-19 virus [U07.1, J12.82]    Hyperglycemia [R73.9]    Leukocytosis [D72.829]    Acute respiratory failure with hypoxia ([facility] [address] [J96.01]    2019 novel coronavirus-infected pneumonia (NCIP) [U07.1, J12.82]    Elevated LDH [R74.02]    Elevated AST (SGOT) [R74.01]    Elevated ferritin [R79.89]    Elevated d-dimer [R79.89]    History of depression [Z86.59]    Essential hypertension [I10]    Class 2 obesity due to excess calories with body mass index (BMI) of 35.0 to 35.9 in adult [E66.09, Z68.35]    Suspected COVID-19 virus infection [Z20.822]    Hypothyroidism [E03.9]    HLD (hyperlipidemia) [E78.5]    PCOS (polycystic ovarian syndrome) [E28.2]    Pneumonia due to organism [J18.9]   Acute hypoxic respiratory failure  - on 6 L NC - wean as tolerated to maintain SpO2 > 90%  - pt/ot - pt adamant about going home, refuses rehab.     COVID 19 PNA  - s/p remdesivir, convalescent palasma (12/30/2020)  - on prednisone 40 mg - wean to 20 mg on discharge x 3 days    Sepsis 2/2 Klebsiella UTI/bacteremia  - afebrile, leukocytosis resolved  - tachycardia - resume low dose bb    Klebsiella bacteremia  - completed cefepime yesterday  - repeat bl cx 1/13 ngtd    Klebsiella UTI  - abx as above    Oral thrush - completed fluconazole    UGIB - hematemsis and melena - resolved  - hold lovenox, d/c asa  - no further bleeding, H/H stable  - GI c/s - EGD deferred given respiratory status at this time    Acute blood loss anemia  - s/p 2 units prbc  - monitor, Hb stable    Liver lesion - 9 mm possible cyst, US -  negative    HTN - on Norvasc    Hx breast cancer - c/w arimidex    HLD  - c/w statin    Hypothyroidism  - c/w synthroid    DMT2  - hold metformin  - ssi   - a1c 6.0    Depression - c/w home meds    Hypokalemia - po replacement    DVT Prophylaxis: scds  Diet: DIET CARB CONTROL; Carb Control: 4 carb choices (60 gms)/meal  Dietary Nutrition Supplements: Diabetic Oral Supplement  Code Status: Full Code      Dispo - continue care, home in AM    Teresa Sanchez MD

## 2021-01-22 NOTE — DISCHARGE INSTR - COC
Continuity of Care Form    Patient Name: Theodore Lawton   :  1955  MRN:  [id number]    Admit date:  2020  Discharge date:  2021    Code Status Order: Full Code   Advance Directives:   Advance Care Flowsheet Documentation       Date/Time Healthcare Directive Type of Healthcare Directive Copy in [address] Agent's Name Healthcare Agent's Phone Number    01/15/21 1120  No, patient does not have an advance directive for healthcare treatment -- -- -- -- --    20 0728  No, patient does not have an advance directive for healthcare treatment -- -- -- -- --            Admitting Physician:  Drake Boeck, DO  PCP: William Russo MD    Discharging Nurse: [facility] Unit/Room#: X6U-9530/2086-58  Discharging Unit Phone Number: [phone]    Emergency Contact:   Extended Emergency Contact Information  Primary Emergency Contact: [facility]  Home Phone: [phone]  Mobile Phone: [phone]  Relation: Child  Secondary Emergency Contact: [facility]  Mobile Phone: [phone]  Relation: Brother/Sister    Past Surgical History:  Past Surgical History:   Procedure Laterality Date    BACK SURGERY      BREAST BIOPSY      BREAST LUMPECTOMY      CARPAL TUNNEL RELEASE      DILATION AND CURETTAGE OF UTERUS      HYSTERECTOMY      OVARY REMOVAL      THYROID SURGERY         Immunization History:   Immunization History   Administered Date(s) Administered    Pneumococcal Conjugate 13-valent Earmon McMinn) 2015       Active Problems:  Patient Active Problem List   Diagnosis Code    Malignant neoplasm of central portion of right female breast ([facility] [address] C50.111    Encounter for consultation Z71.9    Encounter for antineoplastic radiation therapy Z51.0    ER+ (estrogen receptor positive status) Z17.0    HER2-negative carcinoma of breast ([facility] [address] C50.919    Use of anastrozole (Arimidex) Z79.811    Visit for monitoring Arimidex therapy Z51.81, Z79.811    Osteopenia Stable    Rehab Potential (if transferring to Rehab):     Recommended Labs or Other Treatments After Discharge:   - Home care    Physician Certification: I certify the above information and transfer of Herminio Stevenson  is necessary for the continuing treatment of the diagnosis listed and that she requires Home Care for less 30 days.      Update Admission H&P: No change in H&P    PHYSICIAN SIGNATURE:  Electronically signed by Sara Johnson MD on 1/23/21 at 1:47 PM EST

## 2021-01-22 NOTE — PROGRESS NOTES
Call received from retail pharmacy- meds are available for  in in-pt pharmacy upon family arrival at discharge to pay for meds. Current symbicort inhaler in room is to be sent home with pt.

## 2021-01-22 NOTE — PROGRESS NOTES
Pt approached again to lay in bed for PICC to be removed. Pt adamantly refused and also stated she is not leaving today. Stated that her HR is elevated and she does not want to leave. Stated she will leave tomorrow. MD notified. Attempted to notify case management- no answer.

## 2021-01-22 NOTE — PROGRESS NOTES
[facility]    Respiratory Therapy   Home Oxygen Evaluation        Name: Brody Fay Record Number: [id number]  Age: 63 y.o.   Gender:  female   : 1955  Today's date: 2021  Room: C3I-9028/5252-01      Assessment        /72   Pulse 103   Temp 98.5 °F (36.9 °C) (Oral)   Resp 20   Ht 5' 6\" (1.676 m)   Wt 176 lb 2.4 oz (79.9 kg)   SpO2 93%   BMI 28.43 kg/m²     Patient Active Problem List   Diagnosis    Malignant neoplasm of central portion of right female breast ([facility] [address]    Encounter for consultation    Encounter for antineoplastic radiation therapy    ER+ (estrogen receptor positive status)    HER2-negative carcinoma of breast ([facility]    Use of anastrozole (Arimidex)    Visit for monitoring Arimidex therapy    Osteopenia    Pneumonia due to organism    Suspected COVID-19 virus infection    Hypothyroidism    HLD (hyperlipidemia)    PCOS (polycystic ovarian syndrome)    Acute respiratory failure with hypoxia ([facility]    2019 novel coronavirus-infected pneumonia (NCIP)    Elevated LDH    Elevated AST (SGOT)    Elevated ferritin    Elevated d-dimer    History of depression    Essential hypertension    Class 2 obesity due to excess calories with body mass index (BMI) of 35.0 to 35.9 in adult    Pneumonia due to COVID-19 virus    Hyperglycemia    Leukocytosis       Social History:  Social History     Tobacco Use    Smoking status: Never Smoker    Smokeless tobacco: Never Used   Substance Use Topics    Alcohol use: Not Currently    Drug use: Never       Patient Room Air saturation at rest 82%    Oxygen saturations of 88% or less on RA qualifies patient for Home Oxygen    Patient resting on 3  lmp  with an oxygen saturation of  93%     Patient ambulated on 3 lpm with an oxygen saturation of 86%    Patient ambulated on 4 lpm with an oxygen saturation of 87%    Patient ambulated on 5 lpm with an oxygen saturation of 89%     Qualifying patient for home oxygen at 3 lpm @ rest and 5 lpm with ANY activity. In your clinical assessment does the Patient Require Portable Oxygen Tanks?     Yes              [facility] home care company contacted to follow care of patients home oxygen needs on 1/22/2021 at 12:30 PM    Patient/caregiver was educated on Home Oxygen process:  Yes      Level of patient/caregiver understanding able to:   [] Verbalize understanding   [] Demonstrate understanding       [] Teach back        [] Needs reinforcement        []  No available caregiver               []  Other:     Response to education:  Good     Time Spent with Home O2 Set Up:  20  minutes     yuliet drummond RCP on 1/22/2021 at 12:30 PM

## 2021-01-23 VITALS
HEIGHT: 66 IN | OXYGEN SATURATION: 94 % | BODY MASS INDEX: 28.31 KG/M2 | WEIGHT: 176.15 LBS | TEMPERATURE: 98.3 F | RESPIRATION RATE: 20 BRPM | SYSTOLIC BLOOD PRESSURE: 113 MMHG | DIASTOLIC BLOOD PRESSURE: 71 MMHG | HEART RATE: 98 BPM

## 2021-01-23 LAB
GLUCOSE BLD-MCNC: 104 MG/DL (ref 70–99)
GLUCOSE BLD-MCNC: 91 MG/DL (ref 70–99)
PERFORMED ON: ABNORMAL
PERFORMED ON: NORMAL

## 2021-01-23 PROCEDURE — 2580000003 HC RX 258: Performed by: INTERNAL MEDICINE

## 2021-01-23 PROCEDURE — 6370000000 HC RX 637 (ALT 250 FOR IP): Performed by: STUDENT IN AN ORGANIZED HEALTH CARE EDUCATION/TRAINING PROGRAM

## 2021-01-23 PROCEDURE — 6370000000 HC RX 637 (ALT 250 FOR IP): Performed by: INTERNAL MEDICINE

## 2021-01-23 PROCEDURE — 94761 N-INVAS EAR/PLS OXIMETRY MLT: CPT

## 2021-01-23 PROCEDURE — 6370000000 HC RX 637 (ALT 250 FOR IP): Performed by: FAMILY MEDICINE

## 2021-01-23 PROCEDURE — 6360000002 HC RX W HCPCS: Performed by: INTERNAL MEDICINE

## 2021-01-23 PROCEDURE — 2700000000 HC OXYGEN THERAPY PER DAY

## 2021-01-23 RX ORDER — ONDANSETRON 4 MG/1
4 TABLET, FILM COATED ORAL EVERY 4 HOURS PRN
Status: DISCONTINUED | OUTPATIENT
Start: 2021-01-23 | End: 2021-01-23 | Stop reason: HOSPADM

## 2021-01-23 RX ADMIN — ANASTROZOLE 1 MG: 1 TABLET ORAL at 09:02

## 2021-01-23 RX ADMIN — SODIUM CHLORIDE, PRESERVATIVE FREE 10 ML: 5 INJECTION INTRAVENOUS at 10:15

## 2021-01-23 RX ADMIN — ONDANSETRON HYDROCHLORIDE 4 MG: 4 TABLET, FILM COATED ORAL at 12:21

## 2021-01-23 RX ADMIN — LEVOTHYROXINE SODIUM 75 MCG: 0.07 TABLET ORAL at 05:14

## 2021-01-23 RX ADMIN — DULOXETINE HYDROCHLORIDE 60 MG: 60 CAPSULE, DELAYED RELEASE ORAL at 09:02

## 2021-01-23 RX ADMIN — AMLODIPINE BESYLATE 5 MG: 5 TABLET ORAL at 09:02

## 2021-01-23 RX ADMIN — METOPROLOL TARTRATE 25 MG: 25 TABLET, FILM COATED ORAL at 09:02

## 2021-01-23 RX ADMIN — Medication 2 PUFF: at 09:01

## 2021-01-23 RX ADMIN — PANTOPRAZOLE SODIUM 40 MG: 40 TABLET, DELAYED RELEASE ORAL at 05:14

## 2021-01-23 RX ADMIN — PREDNISONE 40 MG: 20 TABLET ORAL at 09:02

## 2021-01-23 RX ADMIN — ATORVASTATIN CALCIUM 40 MG: 40 TABLET, FILM COATED ORAL at 09:02

## 2021-01-23 RX ADMIN — Medication 10 ML: at 10:15

## 2021-01-23 RX ADMIN — CEFEPIME HYDROCHLORIDE 2000 MG: 2 INJECTION, POWDER, FOR SOLUTION INTRAVENOUS at 01:00

## 2021-01-23 ASSESSMENT — PAIN SCALES - GENERAL
PAINLEVEL_OUTOF10: 0
PAINLEVEL_OUTOF10: 0

## 2021-01-23 ASSESSMENT — PAIN SCALES - WONG BAKER
WONGBAKER_NUMERICALRESPONSE: 0

## 2021-01-23 NOTE — CARE COORDINATION
SW spoke to patient via telephone to inform that Medicare supports the doctor's decision to discharge. SW informed that if she chooses to remain at the hospital financial liability will start tomorrow 1/24 at 12pm. Patient stated that she was about go get in the shower but would be leaving this afternoon. SW did review services in place and equipment needs. Patient confirms that she has everything. No further needs noted unless indicated by patient or medical staff. MD aware. Nurse aware. Respectfully submitted,    KEIRY Gonzalez  [facility]   [phone]    Electronically signed by KEIRY Sommer on 1/23/2021 at 1:46 PM

## 2021-01-23 NOTE — PROGRESS NOTES
Pt put back into bed to remove PICC line. Pt did great with the walker. Covered with vaseline gauze and tegaderm and instructed pt we can get her a shower in around an hour or so. Pt wants to take it slow so she doesn't get nauseated.

## 2021-01-23 NOTE — PROGRESS NOTES
Data- discharge order received, pt verbalized agreement to discharge, disposition to previous residence, no needs for HHC/DME. Action- discharge instructions prepared and given to patient and son, pt verbalized understanding. Medication information packet given r/t NEW and/or CHANGED prescriptions emphasizing name/purpose/side effects, pt verbalized understanding. Discharge instruction summary: Diet- carb control, Activity-as tolerated, Primary Care Physician as follows: Jaky Hansen [phone] f/u appointment call for and schedule for egd with antwan, prescription medications filled with our pharmacy and script for metoprolol given to fill. Response- Pt belongings gathered, PIcc line removed previously and tele removed. Disposition is home with home care and o2, taken to lobby via w/c w/ all belongings to sons vehicle, no complications.

## 2021-01-23 NOTE — CARE COORDINATION
Rosas received call from [facility]. He stated after MD review of patient's medical record, MD found in favor termination of service. Patient will have until 1/24/2021 at 12 noon to leave the hospital before patient liability begins. Bobby plans to contact the patient with the above information. Rosas updated Rosas co-worker who is following this case.   Electronically signed by JOE Graf, LEANNE, Case Management on 1/23/2021 at 1:36 PM  [address] [facility] [phone]

## 2021-01-23 NOTE — DISCHARGE SUMMARY
Hospitalist Discharge Summary     Fidelina Galeas  : 1955  MRN: [id number]    Admit date: 2020  Discharge date: 2021    Admitting Physician: Svitlana Sanchez DO    Discharge Diagnoses:   Patient Active Problem List   Diagnosis    Malignant neoplasm of central portion of right female breast [facility])    Encounter for consultation    Encounter for antineoplastic radiation therapy    ER+ (estrogen receptor positive status)    HER2-negative carcinoma of breast ([facility] [address]    Use of anastrozole (Arimidex)    Visit for monitoring Arimidex therapy    Osteopenia    Pneumonia due to organism    Suspected COVID-19 virus infection    Hypothyroidism    HLD (hyperlipidemia)    PCOS (polycystic ovarian syndrome)    Acute respiratory failure with hypoxia ([facility] [address]    2019 novel coronavirus-infected pneumonia ([facility])    Elevated LDH    Elevated AST (SGOT)    Elevated ferritin    Elevated d-dimer    History of depression    Essential hypertension    Class 2 obesity due to excess calories with body mass index (BMI) of 35.0 to 35.9 in adult    Pneumonia due to COVID-19 virus    Hyperglycemia    Leukocytosis       Admission Condition: poor    Discharged Condition: stable    Discharge Exam:  VITALS:  /71   Pulse 98   Temp 98.3 °F (36.8 °C) (Oral)   Resp 20   Ht 5' 6\" (1.676 m)   Wt 176 lb 2.4 oz (79.9 kg)   SpO2 94%   BMI 28.43 kg/m²   CONSTITUTIONAL:  awake, alert, cooperative, no apparent distress, and appears stated age  EYES:  Lids and lashes normal, PERRL, EOMI, sclera clear, conjunctiva normal  ENT:  NC/AT, MMM    NECK:  Supple, symmetrical, trachea midline, no adenopathy  HEMATOLOGIC/LYMPHATICS:  no cervical, supraclavicular or axillary lymphadenopathy  LUNGS:  clear to auscultation bilaterally, No increased work of breathing, good air exchange, no crackles or wheezing  CARDIOVASCULAR:  Regular rate and rhythm, normal S1 and S2, no S3 or S4, and no significant murmurs, rubs or gallops size and mediastinal contours are stable, with stable prominence of the aortic knob. There has been slight interval improvement in appearance of diffuse airspace opacity throughout both lungs. There is no evidence of a pneumothorax. Slight interval improvement in appearance of diffuse multifocal airspace opacities, likely a combination of multifocal pneumonia and superimposed pulmonary edema. Xr Chest Portable    Result Date: 1/12/2021  EXAMINATION: ONE XRAY VIEW OF THE CHEST 1/12/2021 11:55 am COMPARISON: Chest CT 01/10/2021, chest x-ray 12/28/2020 HISTORY: ORDERING SYSTEM PROVIDED HISTORY: Increase Oxygen Demand TECHNOLOGIST PROVIDED HISTORY: Reason for exam:->Increase Oxygen Demand FINDINGS: Heart size stable. Diffuse bilateral ground-glass and airspace disease not significantly changed when compared with the chest CT. No pneumothorax or evidence of significant pleural effusion     Stable diffuse bilateral lung disease compatible with pneumonia and/or edema     Xr Chest Portable    Result Date: 12/28/2020  EXAMINATION: ONE XRAY VIEW OF THE CHEST 12/28/2020 9:31 pm COMPARISON: 10/03/2007 HISTORY: ORDERING SYSTEM PROVIDED HISTORY: sob TECHNOLOGIST PROVIDED HISTORY: Reason for exam:->sob Reason for Exam: sob Acuity: Acute Type of Exam: Initial FINDINGS: The heart size is stable. Posterior fixation jaime remains in place. Bilateral ill-defined airspace opacities. No pneumothorax. No pleural effusion. Bilateral ill-defined airspace opacities could represent atypical pneumonia, multifocal bacterial pneumonia or subsegmental atelectasis     Ct Chest Pulmonary Embolism W Contrast    Result Date: 1/10/2021  EXAMINATION: CTA OF THE CHEST 1/10/2021 3:39 pm TECHNIQUE: CTA of the chest was performed after the administration of intravenous contrast.  Multiplanar reformatted images are provided for review. MIP images are provided for review.  Dose modulation, iterative reconstruction, and/or weight based adjustment of the mA/kV was utilized to reduce the radiation dose to as low as reasonably achievable. COMPARISON: None. HISTORY: ORDERING SYSTEM PROVIDED HISTORY: elevated D-dimer, hypotension TECHNOLOGIST PROVIDED HISTORY: Reason for exam:->elevated D-dimer, hypotension Reason for Exam: elevated d dimer hypotensive Acuity: Acute Type of Exam: Initial FINDINGS: Pulmonary Arteries: Pulmonary arteries are adequately opacified for evaluation. No evidence of intraluminal filling defect to suggest pulmonary embolism. Main pulmonary artery is normal in caliber. Mediastinum: The aorta is mildly dilated well opacified with no filling defect. There are small lymph nodes in the mediastinum measuring up to 7 mm. Lungs/pleura: There is a small left pleural effusion leg posteriorly. There are extensive ground-glass opacities throughout both upper lobes extending inferiorly into the perihilar regions with air bronchograms. There are subsegmental opacities scattered along both lung bases. No pulmonary nodule or mass can be seen. Upper abdomen: The adrenals are not well visualized. There is a 9 mm hypodensity in the left lobe of the liver anteriorly which does not enhance. Bones/ soft tissues: There are moderate degenerative changes throughout the spine with scoliosis and a Landry jaime in place with bony fusion of the posterior elements. No evidence of pulmonary embolism. Mildly dilated and atherosclerotic thoracic aorta with no aneurysm or dissection. Extensive ground-glass opacities throughout both lungs some and subsegmental bibasilar opacities which could represent extensive multisegmental bronchopneumonia and/or pulmonary edema vs pulmonary hemorrhage. Small left pleural effusion. Small lymph nodes scattered in the mediastinum which are not pathologic by size criteria. Moderate degenerative changes and postop changes in the spine 9 mm hypodensity left lobe liver which could represent a cyst but is ill-defined. Suggest ultrasound correlation. Us Abdomen Limited    Result Date: 1/20/2021  EXAMINATION: RIGHT UPPER QUADRANT ULTRASOUND 1/20/2021 7:03 am COMPARISON: None. HISTORY: ORDERING SYSTEM PROVIDED HISTORY: 9 mm hypodensity left lobe liver on CT scan, possible cyst TECHNOLOGIST PROVIDED HISTORY: Reason for exam:->9 mm hypodensity left lobe liver on CT scan, possible cyst FINDINGS: LIVER:  The liver demonstrates normal echogenicity without evidence of intrahepatic biliary ductal dilatation. The previously described 9 mm hypodensity within the left hepatic lobe is not visualized by ultrasound. BILIARY SYSTEM:  Gallbladder is unremarkable without evidence of pericholecystic fluid, wall thickening or stones. Negative sonographic Weber's sign. Common bile duct is within normal limits measuring 5 mm. RIGHT KIDNEY: The right kidney is grossly unremarkable without evidence of hydronephrosis. Note is made of a benign 2.6 cm simple cyst arising from the upper pole. PANCREAS:  Visualized portions of the pancreas are unremarkable. OTHER: No evidence of right upper quadrant ascites. No sonographic abnormality.        Other Significant Diagnostic Studies: As described above    Treatments: As described above    Disposition: home    Discharge Medications:     [place]"   Home Medication Instructions [id number]    Printed on:01/23/21 1218   Medication Information                      albuterol sulfate  (90 Base) MCG/ACT inhaler  Inhale 2 puffs into the lungs every 4 hours as needed for Wheezing             alendronate (FOSAMAX) 70 MG tablet  Take 1 tablet by mouth every 7 days             amLODIPine (NORVASC) 5 MG tablet  Take 0.5 tablets by mouth daily             anastrozole (ARIMIDEX) 1 MG tablet  TAKE 1 TABLET BY MOUTH DAILY             budesonide-formoterol (SYMBICORT) 160-4.5 MCG/ACT AERO  Inhale 2 puffs into the lungs 2 times daily             Calcium Carbonate-Vit D-Min (CALCIUM 1200 PO)  Take by mouth Daily              DULoxetine (CYMBALTA) 60 MG extended release capsule  Take 60 mg by mouth daily             Fexofenadine HCl (ALLEGRA PO)  Take 1 tablet by mouth daily as needed              levothyroxine (LEVOTHROID) 75 MCG tablet  Take 75 mcg by mouth Daily             metFORMIN (GLUCOPHAGE) 500 MG tablet  Take 1,000 mg by mouth 2 times daily (with meals)              metoprolol tartrate (LOPRESSOR) 25 MG tablet  Take 1 tablet by mouth 2 times daily             Multiple Vitamins-Minerals (CENTRUM PO)  Take by mouth             pantoprazole (PROTONIX) 40 MG tablet  Take 1 tablet by mouth 2 times daily (before meals)             polyethylene glycol (GLYCOLAX) 17 g packet  Take 17 g by mouth daily             predniSONE (DELTASONE) 20 MG tablet  Take 1 tablet by mouth daily for 3 days             senna (SENOKOT) 8.6 MG tablet  Take 1 tablet by mouth nightly             simvastatin (ZOCOR) 40 MG tablet  Take 40 mg by mouth nightly                 35 Minutes spent on patient evaluation, counseling and discharge planning.      Signed:  Tay Delaney MD  1/23/2021, 12:18 PM

## 2021-01-23 NOTE — CARE COORDINATION
SW placed phone call to patient's room to acknowledge receipt of appeal yesterday. SW notified patient that they received her medical records and everything is still being reviewed. SW confirmed that this was her right and she had the right to change her mind at any time. MICHEAL informed that we would follow up once more info comes available. Respectfully submitted,    KEIRY Angel   [phone]    Electronically signed by KEIRY Galvan on 1/23/2021 at 9:01 AM

## 2021-01-23 NOTE — CARE COORDINATION
MICHEAL checked Valerie Nicholas Appeal status today at the website of [employer]. [email or web address]    Appeal ID: [id number]    Appeal Started: 1/22/2021 2:45:09 PM  Medical Records Received: 1/22/2021 4:14:24 PM    SW will follow up again later today. Respectfully submitted,    KEIRY Kern  [facility]   [phone]    Electronically signed by KEIRY Sanderson on 1/23/2021 at 8:56 AM

## 2021-06-22 ENCOUNTER — APPOINTMENT (OUTPATIENT)
Dept: CT IMAGING | Age: 66
End: 2021-06-22
Payer: MEDICARE

## 2021-06-22 ENCOUNTER — HOSPITAL ENCOUNTER (EMERGENCY)
Age: 66
Discharge: HOME | End: 2021-06-23
Attending: EMERGENCY MEDICINE
Payer: MEDICARE

## 2021-06-22 DIAGNOSIS — M54.50 MIDLINE LOW BACK PAIN, UNSPECIFIED CHRONICITY, UNSPECIFIED WHETHER SCIATICA PRESENT: Primary | ICD-10-CM

## 2021-06-22 DIAGNOSIS — N30.00 ACUTE CYSTITIS WITHOUT HEMATURIA: ICD-10-CM

## 2021-06-22 PROCEDURE — 74176 CT ABD & PELVIS W/O CONTRAST: CPT

## 2021-06-22 PROCEDURE — 6360000002 HC RX W HCPCS: Performed by: EMERGENCY MEDICINE

## 2021-06-22 PROCEDURE — 99285 EMERGENCY DEPT VISIT HI MDM: CPT

## 2021-06-22 PROCEDURE — 96372 THER/PROPH/DIAG INJ SC/IM: CPT

## 2021-06-22 PROCEDURE — 3209999900 CT LUMBAR SPINE TRAUMA RECONSTRUCTION

## 2021-06-22 PROCEDURE — 6370000000 HC RX 637 (ALT 250 FOR IP): Performed by: EMERGENCY MEDICINE

## 2021-06-22 RX ORDER — SULFAMETHOXAZOLE AND TRIMETHOPRIM 800; 160 MG/1; MG/1
1 TABLET ORAL 2 TIMES DAILY
Qty: 14 TABLET | Refills: 0 | Status: SHIPPED | OUTPATIENT
Start: 2021-06-22 | End: 2021-06-29

## 2021-06-22 RX ORDER — CIPROFLOXACIN 500 MG/1
500 TABLET, FILM COATED ORAL ONCE
Status: DISCONTINUED | OUTPATIENT
Start: 2021-06-22 | End: 2021-06-22

## 2021-06-22 RX ORDER — ORPHENADRINE CITRATE 30 MG/ML
60 INJECTION INTRAMUSCULAR; INTRAVENOUS ONCE
Status: COMPLETED | OUTPATIENT
Start: 2021-06-22 | End: 2021-06-22

## 2021-06-22 RX ORDER — SULFAMETHOXAZOLE AND TRIMETHOPRIM 800; 160 MG/1; MG/1
1 TABLET ORAL ONCE
Status: COMPLETED | OUTPATIENT
Start: 2021-06-22 | End: 2021-06-22

## 2021-06-22 RX ORDER — HYDROCODONE BITARTRATE AND ACETAMINOPHEN 5; 325 MG/1; MG/1
1 TABLET ORAL ONCE
Status: COMPLETED | OUTPATIENT
Start: 2021-06-22 | End: 2021-06-22

## 2021-06-22 RX ADMIN — ORPHENADRINE CITRATE 60 MG: 60 INJECTION INTRAMUSCULAR; INTRAVENOUS at 21:48

## 2021-06-22 RX ADMIN — SULFAMETHOXAZOLE AND TRIMETHOPRIM 1 TABLET: 800; 160 TABLET ORAL at 23:29

## 2021-06-22 RX ADMIN — HYDROMORPHONE HYDROCHLORIDE 0.5 MG: 1 INJECTION, SOLUTION INTRAMUSCULAR; INTRAVENOUS; SUBCUTANEOUS at 21:46

## 2021-06-22 RX ADMIN — HYDROCODONE BITARTRATE AND ACETAMINOPHEN 1 TABLET: 5; 325 TABLET ORAL at 23:25

## 2021-06-22 ASSESSMENT — PAIN DESCRIPTION - PAIN TYPE
TYPE: ACUTE PAIN

## 2021-06-22 ASSESSMENT — PAIN DESCRIPTION - LOCATION
LOCATION: BACK

## 2021-06-22 ASSESSMENT — ENCOUNTER SYMPTOMS
RHINORRHEA: 0
EYE PAIN: 0
ABDOMINAL PAIN: 0
NAUSEA: 0
COUGH: 0
SORE THROAT: 0
WHEEZING: 0
DIARRHEA: 0
SHORTNESS OF BREATH: 0
BACK PAIN: 1
EYE DISCHARGE: 0
VOMITING: 0

## 2021-06-22 ASSESSMENT — PAIN DESCRIPTION - DESCRIPTORS: DESCRIPTORS: ACHING;SHARP

## 2021-06-22 ASSESSMENT — PAIN SCALES - GENERAL
PAINLEVEL_OUTOF10: 10
PAINLEVEL_OUTOF10: 10
PAINLEVEL_OUTOF10: 6

## 2021-06-22 ASSESSMENT — PAIN DESCRIPTION - ONSET: ONSET: PROGRESSIVE

## 2021-06-22 ASSESSMENT — PAIN DESCRIPTION - ORIENTATION
ORIENTATION: LOWER

## 2021-06-22 ASSESSMENT — PAIN DESCRIPTION - PROGRESSION: CLINICAL_PROGRESSION: GRADUALLY WORSENING

## 2021-06-22 ASSESSMENT — PAIN - FUNCTIONAL ASSESSMENT: PAIN_FUNCTIONAL_ASSESSMENT: ACTIVITIES ARE NOT PREVENTED

## 2021-06-22 ASSESSMENT — PAIN DESCRIPTION - FREQUENCY: FREQUENCY: CONTINUOUS

## 2021-06-22 ASSESSMENT — PAIN DESCRIPTION - DIRECTION: RADIATING_TOWARDS: NON RADIATING

## 2021-06-23 VITALS
HEIGHT: 70 IN | OXYGEN SATURATION: 96 % | HEART RATE: 100 BPM | DIASTOLIC BLOOD PRESSURE: 89 MMHG | WEIGHT: 172.84 LBS | TEMPERATURE: 98.8 F | SYSTOLIC BLOOD PRESSURE: 127 MMHG | RESPIRATION RATE: 17 BRPM | BODY MASS INDEX: 24.74 KG/M2

## 2021-06-23 ASSESSMENT — PAIN DESCRIPTION - ORIENTATION: ORIENTATION: LOWER

## 2021-06-23 ASSESSMENT — PAIN SCALES - GENERAL: PAINLEVEL_OUTOF10: 5

## 2021-06-23 ASSESSMENT — PAIN DESCRIPTION - LOCATION: LOCATION: BACK

## 2021-06-23 ASSESSMENT — PAIN DESCRIPTION - PAIN TYPE: TYPE: ACUTE PAIN

## 2021-06-23 NOTE — ED NOTES
Discharge instructions reviewed with patient and verbalized understanding. Denies further questions and successful teach back occurred. Discharged ambulatory using cane to ED lobby. Written discharge instructions provided to pain. Prescriptions sent electronically to [facility] pharmacy.       Vadim Moore RN  06/23/21 2552

## 2021-06-23 NOTE — ED NOTES
Dr. Arreguin Brooms in room to discuss radiology results and discharge plan of care with patient.       Mello Lopez RN  06/23/21 0134

## 2021-06-23 NOTE — ED PROVIDER NOTES
[facility]  eMERGENCY dEPARTMENT eNCOUnter        Pt Name: Tello Perez  MRN: [id number]  [place] 1955  Date of evaluation: 6/22/2021  Provider: Tangela Shipley MD  PCP: Tad Cosby MD      [address] [place] [address]       Chief Complaint   Patient presents with    Back Pain     Patient states lower back pain. States she had rods put in when she was 20. Seen at [place] [address], not happy that they didn't do xrays, patient diagnosed with lumbar radiculopathy and acute cystitis. Prescriptions sent to her pharmacy but patient states pharmacy was closed. Came straight here       HISTORY OFPRESENT ILLNESS   (Location/Symptom, Timing/Onset, Context/Setting, Quality, Duration, Modifying Factors,Severity)  Note limiting factors. Tello Perez is a 61 y.o. female       Location/Symptom: Low back pain  Timing/Onset: 1 day. Context/Setting: She has a remote history of fixating rods for scoliosis. She has back problems off and on. She has had flareups over the years. She is not on any chronic pain management. She has been through a lot of stress over the last 4 months with 2 deaths in the family. Patient also had Covid got very sick and had a pulmonary embolus. She is helping take care of her mother. Father recently committed suicide. Quality: Aching  Duration: 1 to 2 days  Modifying Factors: Moving. However she does have a history of kidney stones. She went to [facility] and patient was seen by midlevel provider. She was concerned that she did not have x-rays. She was diagnosed with a cystitis and lumbar radiculopathy. Prescriptions were written for Vicodin prednisone and Macrobid. However when she left that hospital the pharmacy was closed so she apparently came directly here. She is on Xarelto so she really cannot take any nonsteroidal agents. Severity: 10 out of 10    Nursing Noteswere all reviewed and agreed with or any disagreements were addressed  in the HPI. REVIEW OF SYSTEMS    (2-9 systems for level 4, 10 or more for level 5)     Review of Systems   Constitutional: Negative for chills, fatigue and fever. HENT: Negative for ear pain, rhinorrhea and sore throat. Eyes: Negative for pain, discharge and visual disturbance. Respiratory: Negative for cough, shortness of breath and wheezing. Cardiovascular: Negative for chest pain, palpitations and leg swelling. Gastrointestinal: Negative for abdominal pain, diarrhea, nausea and vomiting. Genitourinary: Negative for difficulty urinating, dysuria, pelvic pain and vaginal discharge. Musculoskeletal: Positive for back pain. Negative for arthralgias, joint swelling and neck pain. Skin: Negative for rash. Allergic/Immunologic: Negative for environmental allergies. Neurological: Negative for dizziness, seizures, syncope and headaches. Hematological: Negative for adenopathy. Psychiatric/Behavioral: Negative for dysphoric mood and suicidal ideas. The patient is not nervous/anxious.           PAST MEDICAL HISTORY     Past Medical History:   Diagnosis Date    Arthritis     Breast cancer ([facility] [address]     COVID-19 12/28/2020    Depression     Hypertension     Obesity     Peptic ulcer disease     Scoliosis     Skin cancer     basal cell    Thyroid disease          SURGICAL HISTORY     Past Surgical History:   Procedure Laterality Date    BACK SURGERY      BREAST BIOPSY      BREAST LUMPECTOMY      CARPAL TUNNEL RELEASE      DILATION AND CURETTAGE OF UTERUS      HYSTERECTOMY      OVARY REMOVAL      THYROID SURGERY           CURRENTMEDICATIONS       Previous Medications    ALENDRONATE (FOSAMAX) 70 MG TABLET    Take 1 tablet by mouth every 7 days    AMLODIPINE (NORVASC) 5 MG TABLET    Take 0.5 tablets by mouth daily    ANASTROZOLE (ARIMIDEX) 1 MG TABLET    TAKE 1 TABLET BY MOUTH DAILY    CALCIUM CARBONATE-VIT D-MIN (CALCIUM 1200 PO)    Take by mouth Daily     DULOXETINE (CYMBALTA) 60 MG EXTENDED RELEASE CAPSULE    Take 60 mg by mouth 2 times daily     FEXOFENADINE HCL (ALLEGRA PO)    Take 1 tablet by mouth daily as needed     LEVOTHYROXINE (LEVOTHROID) 75 MCG TABLET    Take 75 mcg by mouth Daily    METFORMIN (GLUCOPHAGE) 500 MG TABLET    Take 1,000 mg by mouth 2 times daily (with meals)     METOPROLOL TARTRATE (LOPRESSOR) 25 MG TABLET    Take 1 tablet by mouth 2 times daily    MULTIPLE VITAMINS-MINERALS (CENTRUM PO)    Take by mouth    PANTOPRAZOLE (PROTONIX) 40 MG TABLET    Take 1 tablet by mouth 2 times daily (before meals)    RIVAROXABAN (XARELTO) 20 MG TABS TABLET    Take 20 mg by mouth Daily with supper    SIMVASTATIN (ZOCOR) 40 MG TABLET    Take 40 mg by mouth nightly    TIZANIDINE HCL PO    Take by mouth nightly as needed       ALLERGIES     [place]    FAMILY HISTORY     History reviewed. No pertinent family history. SOCIAL HISTORY       Social History     Socioeconomic History    Marital status:      Spouse name: None    Number of children: None    Years of education: None    Highest education level: None   Occupational History    None   Tobacco Use    Smoking status: Never Smoker    Smokeless tobacco: Never Used   Vaping Use    Vaping Use: Never used   Substance and Sexual Activity    Alcohol use: Not Currently    Drug use: Never    Sexual activity: None   Other Topics Concern    None   Social History Narrative    None     Social Determinants of Health     Financial Resource Strain:     Difficulty of Paying Living Expenses:    Food Insecurity:     Worried About Running Out of Food in the Last Year:     Ran Out of Food in the Last Year:    Transportation Needs:     Lack of Transportation (Medical):      Lack of Transportation (Non-Medical):    Physical Activity:     Days of Exercise per Week:     Minutes of Exercise per Session:    Stress:     Feeling of Stress :    Social Connections:     Frequency of Communication with Friends and Family:     Frequency of Social Gatherings with Friends and Family:     Attends Zoroastrianism Services:     Active Member of Clubs or Organizations:     Attends Club or Organization Meetings:     Marital Status:    Intimate Partner Violence:     Fear of Current or Ex-Partner:     Emotionally Abused:     Physically Abused:     Sexually Abused:        SCREENINGS    Juan Coma Scale  Eye Opening: Spontaneous  Best Verbal Response: Oriented  Best Motor Response: Obeys commands  Juan Coma Scale Score: 15        PHYSICAL EXAM    (up to 7 for level 4, 8 or more for level 5)     ED Triage Vitals [06/22/21 2125]   BP Temp Temp Source Pulse Resp SpO2 Height Weight   -- 98.8 °F (37.1 °C) Oral 94 -- 97 % 5' 10\" (1.778 m) 172 lb 13.5 oz (78.4 kg)      height is 5' 10\" (1.778 m) and weight is 172 lb 13.5 oz (78.4 kg). Her oral temperature is 98.8 °F (37.1 °C). Her blood pressure is 146/86 (abnormal) and her pulse is 94. Her respiration is 18 and oxygen saturation is 97%. Physical Exam  Constitutional:       Appearance: She is well-developed. She is not diaphoretic. HENT:      Head: Normocephalic and atraumatic. Right Ear: External ear normal.      Left Ear: External ear normal.   Eyes:      General: No scleral icterus. Right eye: No discharge. Left eye: No discharge. Neck:      Thyroid: No thyromegaly. Vascular: No JVD. Trachea: No tracheal deviation. Cardiovascular:      Rate and Rhythm: Normal rate and regular rhythm. Heart sounds: No murmur heard. No friction rub. No gallop. Pulmonary:      Effort: Pulmonary effort is normal. No respiratory distress. Breath sounds: Normal breath sounds. No stridor. No wheezing or rales. Abdominal:      General: There is no distension. Palpations: Abdomen is soft. Tenderness: There is no abdominal tenderness. There is no right CVA tenderness, left CVA tenderness, guarding or rebound. Musculoskeletal:      Cervical back: Normal range of motion. adjustment of the mA/kV was utilized to reduce the radiation dose to as low as reasonably achievable. COMPARISON: None. HISTORY: ORDERING SYSTEM PROVIDED HISTORY: Back pain, Hx of Stones, positive urine and remote fixating rods for scoliosis. TECHNOLOGIST PROVIDED HISTORY: Reason for exam:->Back pain, Hx of Stones, positive urine and remote fixating rods for scoliosis. Additional Contrast?->None Decision Support Exception - unselect if not a suspected or confirmed emergency medical condition->Emergency Medical Condition (MA) Reason for Exam: Low back pain x 2-3 days Acuity: Acute Type of Exam: Initial Additional signs and symptoms: Pt see at [facility] ER earlier today, dx lumbar ridculopathy & cystitis. Pt upset that no xrays taken. Relevant Medical/Surgical History: Hx breast ca (R), peptic ulcer dis, scoliosis, kidney stones. Sx hysterectomy, oopherectomy, scoli jaime placement at age 27. FINDINGS: Lower Chest: Atelectasis/scarring in the lung bases. Calcified granuloma in the right middle lobe Organs: Multiple small bilateral renal calculi. No ureteral stone or hydronephrosis. 3 cm right renal cyst.  Hepatic cysts up to 1 cm. Spleen, pancreas, adrenals, gallbladder unremarkable GI/Bowel: Diverticula of the sigmoid colon with no associated inflammation. No other gastrointestinal abnormality. Normal appendix Pelvis: Uterus surgically absent. Urinary bladder unremarkable. No pelvic fluid Peritoneum/Retroperitoneum: No ascites or pneumoperitoneum. Normal caliber aorta. Infrarenal IVC filter noted Bones/Soft Tissues: Scoliosis with lower thoracic spinal rods. No fracture or destructive bone lesion. Diffuse lumbar degenerative disc disease most severe at L4-L5. Severe facet osteoarthritis at L4-L5 resulting in 8 mm L5 anterolisthesis. Prominent facet osteoarthritis at L3-L4 and L5-S1. A few mm degenerative retrolisthesis of L3 due to degenerative disc disease. 1. No acute abdominopelvic process 2. Nonobstructing bilateral nephrolithiasis 3. Colonic diverticulosis 4. Scoliosis with lumbar degenerative disease     CT LUMBAR SPINE TRAUMA RECONSTRUCTION    Result Date: 6/22/2021  EXAMINATION: CT OF THE ABDOMEN AND PELVIS WITHOUT CONTRAST; CT OF THE LUMBAR SPINE WITHOUT CONTRAST 6/22/2021 9:47 pm TECHNIQUE: CT of the abdomen and pelvis was performed without the administration of intravenous contrast. Multiplanar reformatted images are provided for review. Dose modulation, iterative reconstruction, and/or weight based adjustment of the mA/kV was utilized to reduce the radiation dose to as low as reasonably achievable.; CT of the lumbar spine was performed without the administration of intravenous contrast. Multiplanar reformatted images are provided for review. Dose modulation, iterative reconstruction, and/or weight based adjustment of the mA/kV was utilized to reduce the radiation dose to as low as reasonably achievable. COMPARISON: None. HISTORY: ORDERING SYSTEM PROVIDED HISTORY: Back pain, Hx of Stones, positive urine and remote fixating rods for scoliosis. TECHNOLOGIST PROVIDED HISTORY: Reason for exam:->Back pain, Hx of Stones, positive urine and remote fixating rods for scoliosis. Additional Contrast?->None Decision Support Exception - unselect if not a suspected or confirmed emergency medical condition->Emergency Medical Condition (MA) Reason for Exam: Low back pain x 2-3 days Acuity: Acute Type of Exam: Initial Additional signs and symptoms: Pt see at [facility] ER earlier today, dx lumbar ridculopathy & cystitis. Pt upset that no xrays taken. Relevant Medical/Surgical History: Hx breast ca (R), peptic ulcer dis, scoliosis, kidney stones. Sx hysterectomy, oopherectomy, scoli jaime placement at age 27. FINDINGS: Lower Chest: Atelectasis/scarring in the lung bases. Calcified granuloma in the right middle lobe Organs: Multiple small bilateral renal calculi. No ureteral stone or hydronephrosis.   3 cm right renal cyst.  Hepatic cysts up to 1 cm. Spleen, pancreas, adrenals, gallbladder unremarkable GI/Bowel: Diverticula of the sigmoid colon with no associated inflammation. No other gastrointestinal abnormality. Normal appendix Pelvis: Uterus surgically absent. Urinary bladder unremarkable. No pelvic fluid Peritoneum/Retroperitoneum: No ascites or pneumoperitoneum. Normal caliber aorta. Infrarenal IVC filter noted Bones/Soft Tissues: Scoliosis with lower thoracic spinal rods. No fracture or destructive bone lesion. Diffuse lumbar degenerative disc disease most severe at L4-L5. Severe facet osteoarthritis at L4-L5 resulting in 8 mm L5 anterolisthesis. Prominent facet osteoarthritis at L3-L4 and L5-S1. A few mm degenerative retrolisthesis of L3 due to degenerative disc disease. 1. No acute abdominopelvic process 2. Nonobstructing bilateral nephrolithiasis 3. Colonic diverticulosis 4. Scoliosis with lumbar degenerative disease           PROCEDURES   Unless otherwise noted below, none     Procedures    CRITICAL CARE TIME   N/A    CONSULTS:  None    EMERGENCY DEPARTMENT COURSE and DIFFERENTIAL DIAGNOSIS/MDM:   Vitals:    Vitals:    06/22/21 2125   BP: (!) 146/86   Pulse: 94   Resp: 18   Temp: 98.8 °F (37.1 °C)   TempSrc: Oral   SpO2: 97%   Weight: 172 lb 13.5 oz (78.4 kg)   Height: 5' 10\" (1.778 m)       Patient was given the following medications:  Medications   sulfamethoxazole-trimethoprim (BACTRIM DS;SEPTRA DS) 800-160 MG per tablet 1 tablet (has no administration in time range)   HYDROmorphone (DILAUDID) injection 0.5 mg (0.5 mg Intramuscular Given 6/22/21 2146)   orphenadrine (NORFLEX) injection 60 mg (60 mg Intramuscular Given 6/22/21 2148)   HYDROcodone-acetaminophen (NORCO) 5-325 MG per tablet 1 tablet (1 tablet Oral Given 6/22/21 2325)       Stable. She does have some kidney stones but it is unlikely they are causing her pain.   However since Macrodantin really does not penetrate the kidneys just to be safe I am switching her to Bactrim. FINAL IMPRESSION      1. Midline low back pain, unspecified chronicity, unspecified whether sciatica present    2.  Acute cystitis without hematuria          DISPOSITION/PLAN    DISPOSITION Decision To Discharge 06/22/2021 11:28:08 PM      PATIENT REFERRED TO:  MD Sowmya Unger [address] [facility] [address]  [address] [place] [address]  [phone]            DISCHARGE MEDICATIONS:  New Prescriptions    SULFAMETHOXAZOLE-TRIMETHOPRIM (BACTRIM DS) 800-160 MG PER TABLET    Take 1 tablet by mouth 2 times daily for 7 days       DISCONTINUED MEDICATIONS:  Discontinued Medications    ALBUTEROL SULFATE  (90 BASE) MCG/ACT INHALER    Inhale 2 puffs into the lungs every 4 hours as needed for Wheezing    BUDESONIDE-FORMOTEROL (SYMBICORT) 160-4.5 MCG/ACT AERO    Inhale 2 puffs into the lungs 2 times daily              (Please note that portions of this note were completed with a voice recognition program.  Efforts were made to editthe dictations but occasionally words are mis-transcribed.)    Cristhian Campos MD (electronically signed)            Cristhian Campos MD  06/22/21 1336

## 2021-06-23 NOTE — ED TRIAGE NOTES
Patient in Room 3 by wheelchair. Patient states she is having lower back pain. States she was seen at [address] [place] [address] [address] just prior to coming to [facility]. States she was displeased that she did not have xrays taken of her back. Also states frustration that the medications ordered for her were sent to a pharmacy that was closed. Patient states she has been having increasing back pain over the past 3 days that she believes is from sleeping in a recliner. Patient was diagnosed with lumbar radiculopathy and at [facility]. E's. Patient states she has had rods in her back since she was 20 and she can tell when something is wrong. States her pain is a 10/10 on VAS. Patient relaxed on stretcher, respirations easy & regular, skin w/d, MMM & pink, cap refill brisk. Patient's son at bedside.

## 2021-08-30 ENCOUNTER — HOSPITAL ENCOUNTER (OUTPATIENT)
Dept: WOMENS IMAGING | Age: 66
Discharge: HOME | End: 2021-08-30
Payer: MEDICARE

## 2021-08-30 DIAGNOSIS — Z12.31 VISIT FOR SCREENING MAMMOGRAM: ICD-10-CM

## 2021-08-30 PROCEDURE — 77063 BREAST TOMOSYNTHESIS BI: CPT

## 2021-09-08 ENCOUNTER — HOSPITAL ENCOUNTER (OUTPATIENT)
Dept: WOMENS IMAGING | Age: 66
Discharge: HOME | End: 2021-09-08
Payer: MEDICARE

## 2021-09-08 ENCOUNTER — HOSPITAL ENCOUNTER (OUTPATIENT)
Dept: ULTRASOUND IMAGING | Age: 66
Discharge: HOME | End: 2021-09-08
Payer: MEDICARE

## 2021-09-08 DIAGNOSIS — R92.8 ABNORMAL MAMMOGRAM: ICD-10-CM

## 2021-09-08 PROCEDURE — 76642 ULTRASOUND BREAST LIMITED: CPT

## 2021-09-08 PROCEDURE — 77065 DX MAMMO INCL CAD UNI: CPT

## 2021-09-10 ENCOUNTER — CASE MANAGEMENT (OUTPATIENT)
Dept: WOMENS IMAGING | Age: 66
End: 2021-09-10

## 2021-09-10 NOTE — PROGRESS NOTES
RN and patient discussed medical history, allergies, and current medication list. Biopsy procedure explained to patient and printed education and instructions were provided as well. Patient denies any further questions at this time. PT scheduled for 9.14.21 at 130 arrive at 100. Pt instructed Dr Drake Marin provided authorization to hold Xarelto 24 hours prior to biopsy. Reviewed pre and post biopsy instructions/information with patient. Pt verbalized understanding.

## 2021-09-14 ENCOUNTER — HOSPITAL ENCOUNTER (OUTPATIENT)
Dept: ULTRASOUND IMAGING | Age: 66
Discharge: HOME | End: 2021-09-14
Payer: MEDICARE

## 2021-09-14 ENCOUNTER — HOSPITAL ENCOUNTER (OUTPATIENT)
Dept: WOMENS IMAGING | Age: 66
Discharge: HOME | End: 2021-09-14
Payer: MEDICARE

## 2021-09-14 VITALS — SYSTOLIC BLOOD PRESSURE: 124 MMHG | OXYGEN SATURATION: 96 % | HEART RATE: 87 BPM | DIASTOLIC BLOOD PRESSURE: 79 MMHG

## 2021-09-14 DIAGNOSIS — R92.8 ABNORMAL MAMMOGRAM: ICD-10-CM

## 2021-09-14 PROCEDURE — 88305 TISSUE EXAM BY PATHOLOGIST: CPT

## 2021-09-14 PROCEDURE — 2720000010 US BREAST BIOPSY W LOC DEVICE 1ST LESION RIGHT

## 2021-09-14 PROCEDURE — 77065 DX MAMMO INCL CAD UNI: CPT

## 2021-09-14 ASSESSMENT — PAIN SCALES - GENERAL
PAINLEVEL_OUTOF10: 0
PAINLEVEL_OUTOF10: 0

## 2021-09-14 NOTE — PROGRESS NOTES
Breast Biopsy Nursing Note    NAME:  Dennis Camacho OF BIRTH:  1955 GENDER: female  MEDICAL RECORD NUMBER:  [id number]  DATE:  9/14/2021     Breast Biopsy completed by .  Expiration date site marker checked immediately prior to use.  Package intact prior to use and no damage noted.  Site marker was removed from protective sterile packaging by physician.  Site marker was placed by physician into right     breast/s.  Hemostasis achieved via site pressure; Biopsy site cleansed with alcohol   Steri-strips applied along with small amount of bacitracin-neomycin polymixin then Coverderm    Breast Biopsy tissue was placed in proper container/s and labeled.  Breast Biopsy tissue was taken to Pathology for evaluation. Procedural Pain:  0  / 10     Post Procedural Pain:  0 / 10     Procedure well tolerated. Pt stable and alert and oriented x 3 upon discharge. Ice pack placed over biopsy site. Pt given post-biopsy education and all questions answered. Pt has NN contact info.        Electronically signed by Marii Lamb RN on 9/14/2021 at 2:22 PM

## 2021-09-17 ENCOUNTER — CASE MANAGEMENT (OUTPATIENT)
Dept: WOMENS IMAGING | Age: 66
End: 2021-09-17

## 2021-09-17 NOTE — PROGRESS NOTES
Called [facility] to discuss biopsy results. US biopsy pathology shows benign breast tissue. Radiologist is calling this discordant with mammographic findings therefore a stereotactic biopsy is recommended. Pt scheduled for 9.22.21 0900 stereotactic biopsy with 0830 arrival time. Pt is reminded she will need to hold her Xarelto again 24 hours prior biopsy, asking Dr Shailesh Blue for authorization to hold again today. Pt agrees with plan and verbalized understanding.

## 2021-09-22 ENCOUNTER — HOSPITAL ENCOUNTER (OUTPATIENT)
Dept: WOMENS IMAGING | Age: 66
Discharge: HOME | End: 2021-09-22
Payer: MEDICARE

## 2021-09-22 VITALS — SYSTOLIC BLOOD PRESSURE: 133 MMHG | HEART RATE: 82 BPM | DIASTOLIC BLOOD PRESSURE: 78 MMHG | OXYGEN SATURATION: 97 %

## 2021-09-22 DIAGNOSIS — R92.8 ABNORMAL MAMMOGRAM: ICD-10-CM

## 2021-09-22 PROCEDURE — 88305 TISSUE EXAM BY PATHOLOGIST: CPT

## 2021-09-22 PROCEDURE — 77065 DX MAMMO INCL CAD UNI: CPT

## 2021-09-22 PROCEDURE — 76098 X-RAY EXAM SURGICAL SPECIMEN: CPT

## 2021-09-22 PROCEDURE — 2709999900 MAM STEREO BREAST BX W LOC DEVICE 1ST LESION RIGHT

## 2021-09-22 PROCEDURE — 88341 IMHCHEM/IMCYTCHM EA ADD ANTB: CPT

## 2021-09-22 PROCEDURE — 88342 IMHCHEM/IMCYTCHM 1ST ANTB: CPT

## 2021-09-22 ASSESSMENT — PAIN SCALES - GENERAL
PAINLEVEL_OUTOF10: 0
PAINLEVEL_OUTOF10: 0

## 2021-09-22 NOTE — PROGRESS NOTES
Breast Biopsy Nursing Note    NAME:  Diana Olguin OF BIRTH:  1955 GENDER: female  MEDICAL RECORD NUMBER:  [id number]  DATE:  9/22/2021     Breast Biopsy completed by .  Expiration date site marker checked immediately prior to use.  Package intact prior to use and no damage noted.  Site marker was removed from protective sterile packaging by physician.  Site marker was placed by physician into right     breast/s.  Hemostasis achieved via site pressure; Biopsy site cleansed with alcohol   Steri-strips applied along with small amount of bacitracin-neomycin polymixin then 2X2 tegaderm    Breast Biopsy tissue was placed in proper container/s and labeled.  Breast Biopsy tissue was taken to Pathology for evaluation. Procedural Pain:  0  / 10     Post Procedural Pain:  0 / 10     Procedure well tolerated. Pt stable and alert and oriented x 3 upon discharge. Ice pack placed over biopsy site. Pt given post-biopsy education and all questions answered. Pt has NN contact info.        Electronically signed by Dimas Bazan RN on 9/22/2021 at 9:41 AM

## 2021-09-24 ENCOUNTER — CASE MANAGEMENT (OUTPATIENT)
Dept: WOMENS IMAGING | Age: 66
End: 2021-09-24

## 2022-03-22 ENCOUNTER — APPOINTMENT (OUTPATIENT)
Dept: ULTRASOUND IMAGING | Age: 67
End: 2022-03-22
Payer: MEDICARE

## 2022-03-22 ENCOUNTER — HOSPITAL ENCOUNTER (OUTPATIENT)
Dept: WOMENS IMAGING | Age: 67
Discharge: HOME | End: 2022-03-22
Payer: MEDICARE

## 2022-03-22 DIAGNOSIS — Z17.0 MALIGNANT NEOPLASM OF CENTRAL PORTION OF RIGHT BREAST IN FEMALE, ESTROGEN RECEPTOR POSITIVE (HCC): ICD-10-CM

## 2022-03-22 DIAGNOSIS — R92.8 ABNORMAL MAMMOGRAM: ICD-10-CM

## 2022-03-22 DIAGNOSIS — C50.111 MALIGNANT NEOPLASM OF CENTRAL PORTION OF RIGHT BREAST IN FEMALE, ESTROGEN RECEPTOR POSITIVE (HCC): ICD-10-CM

## 2022-03-22 PROCEDURE — G0279 TOMOSYNTHESIS, MAMMO: HCPCS

## 2022-04-19 NOTE — PROGRESS NOTES
Hospitalist Progress Note      PCP: Margarette Sesay MD    Date of Admission: 12/28/2020      Hospital Course: admitted with non productive cough and hypoxia, with COVID 19 PNA     Subjective:    Patient seen and examined. Still on vapotherm 40 l., + NRB  Overall feels about the same  + cough     Medications:  Reviewed    Infusion Medications    sodium chloride      dextrose       Scheduled Medications    enoxaparin  40 mg Subcutaneous BID    [START ON 1/6/2021] dexamethasone  10 mg Intravenous Daily    amLODIPine  5 mg Oral Daily    lisinopril  30 mg Oral Daily    metoprolol tartrate  100 mg Oral BID    dexamethasone  20 mg Intravenous Daily    sodium chloride flush  10 mL Intravenous 2 times per day    aspirin  81 mg Oral Daily    levothyroxine  75 mcg Oral Daily    atorvastatin  40 mg Oral Daily    insulin lispro  0-6 Units Subcutaneous TID WC    insulin lispro  0-3 Units Subcutaneous Nightly    albuterol sulfate HFA  2 puff Inhalation 4x daily    And    ipratropium  2 puff Inhalation 4x daily    Vitamin D  2,000 Units Oral Daily    anastrozole  1 mg Oral Daily    DULoxetine  60 mg Oral Daily    remdesivir IVPB  100 mg Intravenous Q24H     PRN Meds: sodium chloride, sodium chloride flush, acetaminophen **OR** acetaminophen, glucose, dextrose, glucagon (rDNA), dextrose, ondansetron, guaiFENesin-dextromethorphan, sodium chloride      Intake/Output Summary (Last 24 hours) at 1/2/2021 1201  Last data filed at 1/2/2021 1139  Gross per 24 hour   Intake 600 ml   Output 1575 ml   Net -975 ml       Physical Exam Performed:    /71   Pulse 72   Temp 98 °F (36.7 °C) (Oral)   Resp 18   Ht 5' 6\" (1.676 m)   Wt 217 lb 13 oz (98.8 kg)   SpO2 90%   BMI 35.16 kg/m²     General appearance: No apparent distress, alert and cooperative. HEENT: Conjunctivae/corneas clear, neck supple w/ full ROM  Respiratory:  Normal respiratory effort.  Faint bilateral rales  Cardiovascular: Regular rate and rhythm, normal S1/S2, no murmurs  Abdomen: Soft, non-tender, non-distended with normal bowel sounds. Musculoskeletal: No edema bilaterally  Neurologic:  No new focal deficits  Psychiatric: Alert and oriented, normal insight  Capillary Refill: Brisk,< 3 seconds   Peripheral Pulses: +2 palpable, equal bilaterally       Labs:   Recent Labs     12/31/20  1225 01/02/21  1040   WBC 8.5 11.8*   HGB 12.3 12.3   HCT 37.3 37.8    382     Recent Labs     12/31/20  1225 01/01/21  0553 01/02/21  1040    145 144   K 3.8 4.1 3.4*    105 105   CO2 29 30 29   BUN 19 22* 24*   CREATININE 0.6 0.7 0.7   CALCIUM 8.4 8.4 8.5     Recent Labs     12/31/20  1225 01/01/21  0553 01/02/21  1040   AST 51* 43* 32   ALT 30 30 28   BILITOT <0.2 0.3 <0.2   ALKPHOS 65 62 68     Recent Labs     12/31/20  1225 01/01/21  0554 01/02/21  1040   INR 1.06 1.08 1.08     No results for input(s): Christine De Dios in the last 72 hours.     Urinalysis:    No results found for: [place], [address], [address]adya Rose [facility], [address] [place] [address], [facility], [facility] 27, [facility] 994    Radiology:  XR CHEST PORTABLE   Final Result   Bilateral ill-defined airspace opacities could represent atypical pneumonia,   multifocal bacterial pneumonia or subsegmental atelectasis                 Assessment/Plan:    Active Hospital Problems    Diagnosis    Acute respiratory failure with hypoxia ([facility] [address] [J96.01]    2019 novel coronavirus-infected pneumonia (NCIP) [U07.1, J12.82]    Elevated LDH [R74.02]    Elevated AST (SGOT) [R74.01]    Elevated ferritin [R79.89]    Elevated d-dimer [R79.89]    History of depression [Z86.59]    Essential hypertension [I10]    Class 2 obesity due to excess calories with body mass index (BMI) of 35.0 to 35.9 in adult [E66.09, Z68.35]    Suspected COVID-19 virus infection [Z20.822]    Hypothyroidism [E03.9]    HLD (hyperlipidemia) [E78.5]    PCOS (polycystic ovarian syndrome) [E28.2]    Pneumonia due to organism [J18.9]     COVID 19 JUSTIN  - c/w decadron [facility]

## 2022-08-23 NOTE — ED NOTES
Patient states she has had some pain relief. States her normal chronic lower back pain is a 1 or 2/10 on VAS. States at this time her pain is a 5/10 on VAS. Patient resting in a relaxed position talking to her son. Side rails up on bed for patient safety and call light near. Patient denies further needs at this time.       Judy Cai RN  06/23/21 [facility]    Patient Name:  Cintia Garay  YOB: 1961  MRN:  [id number]  Date of Service: 8/25/2022   Referring Physician: Brad Gonzalez MD    Diagnosis: [ICD10] C19 Malignant neoplasm of rectosigmoid junction IIIB    TREATMENT RENDERED:     Treatment Summary:  Radiation Oncology - Course: 1 Protocol:    Treatment Site Current Dose Modality From To Elapsed Days Fx.   Rectum  4,500 cGy 15x/20x  3/20/2018  4/24/2018  35 25   Rectal Bst    540 cGy 15X  4/25/2018  4/27/2018   2  3                History of Present Illness:    Cintia Garay is a 60 year old year old female with rectal cancer initially found on routine colonoscopy in January 2018.      She was found to have a stage IIIb; [id number] invasive adenocarcinoma and was treated with neoadjuvant chemo-radiation therapy as above.    Surgery 6/25/18. Path showed:       Residual invasive rectal adenocarcinoma, rare foci (largest focus measuring  0.5 cm), invading into muscularis propria, arising in a large flat  tubulovillous adenoma (patient is status post chemoradiation therapy)  -  Distal margin is involved by tubulovillous adenoma, but is negative for  malignancy  -  Proximal colonic margin, negative for adenomatous dysplasia and malignancy  -  Remaining colon with a hyperplastic polyp  -  Twelve pericolic/rectal lymph nodes, negative for metastatic carcinoma    E: Apical and sentinel lymph nodes:  -  Three lymph nodes, negative for metastatic carcinoma (one of the lymph  nodes shows extensive chemoradiation treatment effect, but is negative for  viable metastatic carcinoma)    ypT2N0     Completed  adjuvant FOLFOX     Had reversal of  stoma on 12/12/18     Restaging CT  in April showed no evidence of disease.    CT C/A/P 5/19/2020  1.   Postoperative changes within the rectum. Presacral thickening and soft tissue density within the right perirectal fat is unchanged. This likely reflects postoperative changes. Continued attention  on follow-up advised.  2.   A 5 cm long segment of small bowel small bowel intussusception in the left abdomen. This is favored to be transient. No associated lead point or mass.  3.   Subcentimeter hypoattenuating hepatic lesions are unchanged, and likely represent cysts.  4.   No evidence for metastatic disease within the chest.  5.   Additional chronic findings as above.    CT C/A/P 6/2/2021   1.   No evidence for metastatic disease within the thorax.  2.   Unchanged postoperative changes within the rectum.  Interval  improvement in previously seen presacral soft tissue thickening and soft  tissue within the right perirectal fat.  3.   Additional chronic findings as above.    Colonoscopy on 2/15/2022: Anastomosis was patent with healthy appearance with no suspicious lesions.  Polypectomy was performed on a single polyp in the transverse colon as well as the sigmoid colon.  Pathology was negative for dysplasia or malignancy.    CT C/A/P 5/31/2022  1.   No evidence for recurrent or metastatic disease within the chest, abdomen, or pelvis.  2.   Postoperative changes in the rectum.  3.   Chronic findings as above    Patient is here for a routine follow-up. She states she has been feeling well. She continues to have some rectal leakage which she is able to control with imodium and dietary changes.  She denies any rectal pain or bleeding.  Denies any urinary symptoms.  She denies any vaginal pain or discharge.      PHYSICAL EXAM:   The Karnofsky performance scale today is 100, Fully active, able to carry on all pre-disease performed without restriction (ECOG equivalent 0).     Vitals:    08/25/22 0805   BP: 132/85   BP Location: RUE - Right upper extremity   Patient Position: Sitting   Cuff Size: Regular   Pulse: (!) 56   Temp: 97.9 °F (36.6 °C)   TempSrc: Temporal   SpO2: 100%   Weight: 61.8 kg (136 lb 3.9 oz)   PainSc:  0       General:   Alert, oriented X3 and in no apparent distress  Skin:  Warm and dry without  rash.    HEENT:  Normocephalic-atraumatic. Normal conjunctivae and sclerae. Mucous membranes are moist.   Cardiovascular:  S1S2 Regular rate and rhythm without murmur.  Respiratory:   Normal respiratory effort.  Clear to auscultation bilaterally.  No wheezes, rubs or rhonchi.  Gastrointestinal:  Soft and nontender. No hepatosplenomegaly.  No guarding or rebound tenderness.  No masses.  Neurologic: No focal deficits or lateralizing signs. Normal gait.  Psychiatric:   Cooperative.  Appropriate mood and affect.  Normal judgment.  DARLIN: Good rectal tone with smooth mucosa.  No lesions or masses noted.  No blood on withdrawn finger.       Component      Latest Ref Rng & Units 5/2/2022 5/31/2022              CEA      0.0 - 5.0 ng/mL 1.4 1.0         IMPRESSION:    Cintia Garay is a pleasant 60 year old female with T3N2 adenocarcinoma of the rectum s/p neoadjuvant chemo-RT with yp T2N0.  She is doing well with no clinical or radiographic evidence of disease. She continues to have some rectal urgency and leakage which is controlled on imodium and diet management.    PLAN    Continue close follow-up with Dr. Gonzalez and Dr. Velasco.     Patient should return to clinic in 12 months    ABE Conrad  [facility]  [email or web address]    Dr. Elvira Ho MD was the supervising physician at the time of visit.    A total of 25 minutes were spent during this encounter including reviewing records, face-to-face time, documentation, and coordinating care.

## 2022-09-30 ENCOUNTER — HOSPITAL ENCOUNTER (OUTPATIENT)
Dept: WOMENS IMAGING | Age: 67
Discharge: HOME | End: 2022-09-30
Payer: MEDICARE

## 2022-09-30 DIAGNOSIS — Z12.31 VISIT FOR SCREENING MAMMOGRAM: ICD-10-CM

## 2022-09-30 PROCEDURE — 77063 BREAST TOMOSYNTHESIS BI: CPT

## 2023-10-02 ENCOUNTER — HOSPITAL ENCOUNTER (OUTPATIENT)
Dept: WOMENS IMAGING | Age: 68
Discharge: HOME | End: 2023-10-02
Payer: MEDICARE

## 2023-10-02 VITALS — WEIGHT: 179 LBS | HEIGHT: 68 IN | BODY MASS INDEX: 27.13 KG/M2

## 2023-10-02 DIAGNOSIS — Z12.31 BREAST CANCER SCREENING BY MAMMOGRAM: ICD-10-CM

## 2023-10-02 PROCEDURE — 77063 BREAST TOMOSYNTHESIS BI: CPT

## 2023-12-24 ENCOUNTER — APPOINTMENT (OUTPATIENT)
Dept: CT IMAGING | Age: 68
End: 2023-12-24
Payer: MEDICARE

## 2023-12-24 ENCOUNTER — HOSPITAL ENCOUNTER (EMERGENCY)
Age: 68
Discharge: HOME | End: 2023-12-24
Attending: EMERGENCY MEDICINE
Payer: MEDICARE

## 2023-12-24 VITALS
HEIGHT: 68 IN | TEMPERATURE: 98.4 F | OXYGEN SATURATION: 96 % | HEART RATE: 82 BPM | DIASTOLIC BLOOD PRESSURE: 83 MMHG | BODY MASS INDEX: 29.07 KG/M2 | RESPIRATION RATE: 18 BRPM | SYSTOLIC BLOOD PRESSURE: 130 MMHG | WEIGHT: 191.8 LBS

## 2023-12-24 DIAGNOSIS — Z79.01 ANTICOAGULATED BY ANTICOAGULATION TREATMENT: ICD-10-CM

## 2023-12-24 DIAGNOSIS — N39.0 URINARY TRACT INFECTION WITH HEMATURIA, SITE UNSPECIFIED: Primary | ICD-10-CM

## 2023-12-24 DIAGNOSIS — R31.9 URINARY TRACT INFECTION WITH HEMATURIA, SITE UNSPECIFIED: Primary | ICD-10-CM

## 2023-12-24 DIAGNOSIS — N20.0 NEPHROLITHIASIS: ICD-10-CM

## 2023-12-24 LAB
BACTERIA URNS QL MICRO: ABNORMAL /HPF
BILIRUB UR QL STRIP.AUTO: NEGATIVE
CLARITY UR: ABNORMAL
COLOR UR: YELLOW
EPI CELLS #/AREA URNS HPF: ABNORMAL /HPF (ref 0–5)
GLUCOSE UR STRIP.AUTO-MCNC: NEGATIVE MG/DL
HGB UR QL STRIP.AUTO: ABNORMAL
KETONES UR STRIP.AUTO-MCNC: NEGATIVE MG/DL
LEUKOCYTE ESTERASE UR QL STRIP.AUTO: ABNORMAL
MUCOUS THREADS #/AREA URNS LPF: ABNORMAL /LPF
NITRITE UR QL STRIP.AUTO: NEGATIVE
PH UR STRIP.AUTO: 5.5 [PH] (ref 5–8)
PROT UR STRIP.AUTO-MCNC: 30 MG/DL
RBC #/AREA URNS HPF: >100 /HPF (ref 0–4)
SP GR UR STRIP.AUTO: >=1.03 (ref 1–1.03)
UA COMPLETE W REFLEX CULTURE PNL UR: ABNORMAL
UA DIPSTICK W REFLEX MICRO PNL UR: YES
URN SPEC COLLECT METH UR: ABNORMAL
UROBILINOGEN UR STRIP-ACNC: 0.2 E.U./DL
WBC #/AREA URNS HPF: ABNORMAL /HPF (ref 0–5)

## 2023-12-24 PROCEDURE — 81001 URINALYSIS AUTO W/SCOPE: CPT

## 2023-12-24 PROCEDURE — 99284 EMERGENCY DEPT VISIT MOD MDM: CPT

## 2023-12-24 PROCEDURE — 74176 CT ABD & PELVIS W/O CONTRAST: CPT

## 2023-12-24 RX ORDER — AMOXICILLIN AND CLAVULANATE POTASSIUM 875; 125 MG/1; MG/1
1 TABLET, FILM COATED ORAL 2 TIMES DAILY
Qty: 20 TABLET | Refills: 0 | Status: SHIPPED | OUTPATIENT
Start: 2023-12-24 | End: 2024-01-03

## 2023-12-24 RX ORDER — PHENAZOPYRIDINE HYDROCHLORIDE 200 MG/1
200 TABLET, FILM COATED ORAL 3 TIMES DAILY PRN
Qty: 6 TABLET | Refills: 0 | Status: SHIPPED | OUTPATIENT
Start: 2023-12-24 | End: 2023-12-27

## 2023-12-24 RX ORDER — HYDROCODONE BITARTRATE AND ACETAMINOPHEN 5; 325 MG/1; MG/1
1 TABLET ORAL EVERY 12 HOURS PRN
COMMUNITY
Start: 2023-09-22

## 2023-12-24 RX ORDER — FLUCONAZOLE 150 MG/1
TABLET ORAL
Qty: 2 TABLET | Refills: 0 | Status: SHIPPED | OUTPATIENT
Start: 2023-12-24

## 2023-12-24 NOTE — ED PROVIDER NOTES
[facility]    Pt Name: Seth Morris   MRN: [id number]   [address] [facility] 1955   Date of evaluation: 12/24/2023   Provider: David Sarmiento MD   PCP: Francia Rizo MD   Note Started: 2:19 PM EST 12/24/23       CHIEF COMPLAINT  Hematuria (About 2 weeks ago she was diagnosed with a UTI per her pcp. Was given a 5 day course of Cipro and then Flagyl for a yeast infection. States improved and was feeling better until a couple days ago noticed intermittent blood in her urine. Today she noticed increased burning and odor with urination along with more blood. Has history of kidney stones about 4 years ago. Has chronic back pain always.)       HISTORY OF PRESENT ILLNESS  Seth Morris is a 79 y.o. female who  has a past medical history of Arthritis, Breast cancer ([address] [facility]), COVID-19, Depression, Hypertension, Obesity, Peptic ulcer disease, Scoliosis, Skin cancer, and Thyroid disease. who presents to the ED complaining of some burning with urination and hematuria. Patient was treated for urinary tract infection earlier this month. Urine culture did grow Klebsiella. Patient was placed on Cipro which should have worked. She transiently improved but now the symptoms have returned. She does have a history of kidney stones. She does have back pain but was unable to tell me if it was new or chronic from her scoliosis. She does not really think it is the kidney stones but has that concern. No constitutional symptoms. No fever chills nausea or vomiting.     I have reviewed the following from the nursing documentation:        HISTORY :      Past Medical History:   Diagnosis Date    Arthritis     Breast cancer ([address])     COVID-19 12/28/2020    Depression     Hypertension     Obesity     Peptic ulcer disease     Scoliosis     Skin cancer     basal cell    Thyroid disease      Past Surgical History:   Procedure Laterality Date    BACK SURGERY      BREAST BIOPSY      BREAST LUMPECTOMY

## 2023-12-24 NOTE — ED TRIAGE NOTES
About 2 weeks ago she was diagnosed with a UTI per her pcp. Was given a 5 day course of Cipro and then Flagyl for a yeast infection. States improved and was feeling better until a couple days ago noticed intermittent blood in her urine. Today she noticed increased burning and odor with urination along with more blood. Has history of kidney stones about 4 years ago. Has chronic back pain always.

## 2024-04-09 ENCOUNTER — HOSPITAL ENCOUNTER (OUTPATIENT)
Dept: WOMENS IMAGING | Age: 69
Discharge: HOME | End: 2024-04-09
Attending: INTERNAL MEDICINE
Payer: MEDICARE

## 2024-04-09 DIAGNOSIS — M81.0 POSTMENOPAUSAL OSTEOPOROSIS: ICD-10-CM

## 2024-04-09 DIAGNOSIS — C50.111 MALIGNANT NEOPLASM OF CENTRAL PORTION OF RIGHT BREAST IN FEMALE, ESTROGEN RECEPTOR POSITIVE (HCC): ICD-10-CM

## 2024-04-09 DIAGNOSIS — Z17.0 MALIGNANT NEOPLASM OF CENTRAL PORTION OF RIGHT BREAST IN FEMALE, ESTROGEN RECEPTOR POSITIVE (HCC): ICD-10-CM

## 2024-04-09 DIAGNOSIS — Z79.811 LONG TERM CURRENT USE OF AROMATASE INHIBITOR: ICD-10-CM

## 2024-04-09 DIAGNOSIS — Z78.0 POSTMENOPAUSAL STATUS: ICD-10-CM

## 2024-04-09 PROCEDURE — 77080 DXA BONE DENSITY AXIAL: CPT

## 2024-05-03 ENCOUNTER — APPOINTMENT (OUTPATIENT)
Dept: CT IMAGING | Age: 69
End: 2024-05-03
Payer: MEDICARE

## 2024-05-03 ENCOUNTER — APPOINTMENT (OUTPATIENT)
Dept: GENERAL RADIOLOGY | Age: 69
End: 2024-05-03
Payer: MEDICARE

## 2024-05-03 ENCOUNTER — HOSPITAL ENCOUNTER (EMERGENCY)
Age: 69
Discharge: HOME | End: 2024-05-03
Attending: EMERGENCY MEDICINE
Payer: MEDICARE

## 2024-05-03 VITALS
BODY MASS INDEX: 31.07 KG/M2 | DIASTOLIC BLOOD PRESSURE: 82 MMHG | TEMPERATURE: 100 F | SYSTOLIC BLOOD PRESSURE: 129 MMHG | WEIGHT: 205.03 LBS | RESPIRATION RATE: 18 BRPM | OXYGEN SATURATION: 95 % | HEIGHT: 68 IN | HEART RATE: 99 BPM

## 2024-05-03 DIAGNOSIS — M79.671 RIGHT FOOT PAIN: ICD-10-CM
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
## 2024-05-03 DIAGNOSIS — S00.03XA CONTUSION OF SCALP, INITIAL ENCOUNTER: ICD-10-CM

## 2024-05-03 DIAGNOSIS — S39.012A STRAIN OF LUMBAR REGION, INITIAL ENCOUNTER: ICD-10-CM

## 2024-05-03 DIAGNOSIS — S16.1XXA STRAIN OF NECK MUSCLE, INITIAL ENCOUNTER: ICD-10-CM

## 2024-05-03 PROCEDURE — 72125 CT NECK SPINE W/O DYE: CPT

## 2024-05-03 PROCEDURE — 99284 EMERGENCY DEPT VISIT MOD MDM: CPT

## 2024-05-03 PROCEDURE — 73630 X-RAY EXAM OF FOOT: CPT

## 2024-05-03 PROCEDURE — 70450 CT HEAD/BRAIN W/O DYE: CPT

## 2024-05-03 PROCEDURE — 71046 X-RAY EXAM CHEST 2 VIEWS: CPT

## 2024-05-03 PROCEDURE — 72131 CT LUMBAR SPINE W/O DYE: CPT

## 2024-05-03 ASSESSMENT — LIFESTYLE VARIABLES
HOW OFTEN DO YOU HAVE A DRINK CONTAINING ALCOHOL: NEVER
HOW MANY STANDARD DRINKS CONTAINING ALCOHOL DO YOU HAVE ON A TYPICAL DAY: PATIENT DOES NOT DRINK

## 2024-05-03 ASSESSMENT — PAIN SCALES - GENERAL
PAINLEVEL_OUTOF10: 7
PAINLEVEL_OUTOF10: 10

## 2024-05-03 ASSESSMENT — PAIN DESCRIPTION - LOCATION: LOCATION: OTHER (COMMENT)

## 2024-05-03 ASSESSMENT — PAIN DESCRIPTION - DESCRIPTORS: DESCRIPTORS: DISCOMFORT

## 2024-05-03 ASSESSMENT — PAIN - FUNCTIONAL ASSESSMENT: PAIN_FUNCTIONAL_ASSESSMENT: 0-10

## 2024-05-04 NOTE — ED PROVIDER NOTES
MD Vilma  [address]   [address] [place] [address] [place] [id number] [phone]    Schedule an appointment as soon as possible for a visit       [facility]  [address] [facility] [address] [phone]  Go to   If symptoms worsen    FINAL IMPRESSION:    1. Fall, initial encounter    2. Strain of neck muscle, initial encounter    3. Contusion of scalp, initial encounter    4. Strain of lumbar region, initial encounter    5. Right foot pain        (Please note that I used voice recognition software to generate this note.  Occasionally words are mistranscribed despite my efforts to edit errors.)      Nadia Holt MD  05/03/24 [id number]

## 2024-06-17 ENCOUNTER — HOSPITAL ENCOUNTER (EMERGENCY)
Age: 69
Discharge: HOME | End: 2024-06-18
Attending: EMERGENCY MEDICINE
Payer: MEDICARE

## 2024-06-17 ENCOUNTER — APPOINTMENT (OUTPATIENT)
Dept: GENERAL RADIOLOGY | Age: 69
End: 2024-06-17
Payer: MEDICARE

## 2024-06-17 DIAGNOSIS — M25.572 ACUTE LEFT ANKLE PAIN: Primary | ICD-10-CM

## 2024-06-17 DIAGNOSIS — N30.01 ACUTE CYSTITIS WITH HEMATURIA: ICD-10-CM

## 2024-06-17 PROCEDURE — 85652 RBC SED RATE AUTOMATED: CPT

## 2024-06-17 PROCEDURE — 80048 BASIC METABOLIC PNL TOTAL CA: CPT

## 2024-06-17 PROCEDURE — 99284 EMERGENCY DEPT VISIT MOD MDM: CPT

## 2024-06-17 PROCEDURE — 87635 SARS-COV-2 COVID-19 AMP PRB: CPT

## 2024-06-17 PROCEDURE — 6370000000 HC RX 637 (ALT 250 FOR IP): Performed by: EMERGENCY MEDICINE

## 2024-06-17 PROCEDURE — 86140 C-REACTIVE PROTEIN: CPT

## 2024-06-17 PROCEDURE — 87804 INFLUENZA ASSAY W/OPTIC: CPT

## 2024-06-17 PROCEDURE — 73610 X-RAY EXAM OF ANKLE: CPT

## 2024-06-17 PROCEDURE — 84145 PROCALCITONIN (PCT): CPT

## 2024-06-17 PROCEDURE — 36415 COLL VENOUS BLD VENIPUNCTURE: CPT

## 2024-06-17 PROCEDURE — 85025 COMPLETE CBC W/AUTO DIFF WBC: CPT

## 2024-06-17 RX ORDER — NAPROXEN 250 MG/1
500 TABLET ORAL ONCE
Status: COMPLETED | OUTPATIENT
Start: 2024-06-17 | End: 2024-06-17

## 2024-06-17 RX ADMIN — NAPROXEN SODIUM 500 MG: 250 TABLET ORAL at 23:43

## 2024-06-17 ASSESSMENT — PAIN - FUNCTIONAL ASSESSMENT: PAIN_FUNCTIONAL_ASSESSMENT: 0-10

## 2024-06-17 ASSESSMENT — PAIN DESCRIPTION - LOCATION
LOCATION: ANKLE
LOCATION: ANKLE

## 2024-06-17 ASSESSMENT — PAIN SCALES - GENERAL
PAINLEVEL_OUTOF10: 10
PAINLEVEL_OUTOF10: 10

## 2024-06-17 ASSESSMENT — PAIN DESCRIPTION - ORIENTATION: ORIENTATION: LEFT

## 2024-06-17 ASSESSMENT — LIFESTYLE VARIABLES: HOW OFTEN DO YOU HAVE A DRINK CONTAINING ALCOHOL: NEVER

## 2024-06-18 VITALS
HEIGHT: 68 IN | WEIGHT: 204.37 LBS | HEART RATE: 95 BPM | OXYGEN SATURATION: 94 % | BODY MASS INDEX: 30.97 KG/M2 | DIASTOLIC BLOOD PRESSURE: 97 MMHG | RESPIRATION RATE: 16 BRPM | SYSTOLIC BLOOD PRESSURE: 141 MMHG | TEMPERATURE: 100.7 F

## 2024-06-18 LAB
ANION GAP SERPL CALCULATED.3IONS-SCNC: 11 MMOL/L (ref 3–16)
BACTERIA URNS QL MICRO: ABNORMAL /HPF
BASOPHILS # BLD: 0 K/UL (ref 0–0.2)
BASOPHILS NFR BLD: 0.4 %
BILIRUB UR QL STRIP.AUTO: NEGATIVE
BUN SERPL-MCNC: 18 MG/DL (ref 7–20)
CALCIUM SERPL-MCNC: 9.8 MG/DL (ref 8.3–10.6)
CHARACTER UR: ABNORMAL
CHLORIDE SERPL-SCNC: 103 MMOL/L (ref 99–110)
CLARITY UR: ABNORMAL
CO2 SERPL-SCNC: 27 MMOL/L (ref 21–32)
COLOR UR: YELLOW
CREAT SERPL-MCNC: 0.9 MG/DL (ref 0.6–1.2)
CRP SERPL-MCNC: 16.4 MG/L (ref 0–5.1)
DEPRECATED RDW RBC AUTO: 14.3 % (ref 12.4–15.4)
EOSINOPHIL # BLD: 0 K/UL (ref 0–0.6)
EOSINOPHIL NFR BLD: 0.2 %
EPI CELLS #/AREA URNS HPF: ABNORMAL /HPF (ref 0–5)
ERYTHROCYTE [SEDIMENTATION RATE] IN BLOOD BY WESTERGREN METHOD: 10 MM/HR (ref 0–30)
FLUAV RNA UPPER RESP QL NAA+PROBE: NEGATIVE
FLUBV AG NPH QL: NEGATIVE
GFR SERPLBLD CREATININE-BSD FMLA CKD-EPI: 70 ML/MIN/{1.73_M2}
GLUCOSE SERPL-MCNC: 96 MG/DL (ref 70–99)
GLUCOSE UR STRIP.AUTO-MCNC: NEGATIVE MG/DL
HCT VFR BLD AUTO: 45.1 % (ref 36–48)
HGB BLD-MCNC: 14.5 G/DL (ref 12–16)
HGB UR QL STRIP.AUTO: ABNORMAL
IMM GRANULOCYTES # BLD: 0 K/UL (ref 0–0.2)
IMM GRANULOCYTES NFR BLD: 0.1 %
KETONES UR STRIP.AUTO-MCNC: ABNORMAL MG/DL
LEUKOCYTE ESTERASE UR QL STRIP.AUTO: ABNORMAL
LYMPHOCYTES # BLD: 0.9 K/UL (ref 1–5.1)
LYMPHOCYTES NFR BLD: 9 %
MCH RBC QN AUTO: 31 PG (ref 26–34)
MCHC RBC AUTO-ENTMCNC: 32.2 G/DL (ref 32–36.4)
MCV RBC AUTO: 96.4 FL (ref 80–100)
MONOCYTES # BLD: 1 K/UL (ref 0–1.3)
MONOCYTES NFR BLD: 9.2 %
NEUTROPHILS # BLD: 8.4 K/UL (ref 1.7–7.7)
NEUTROPHILS NFR BLD: 81.1 %
NITRITE UR QL STRIP.AUTO: POSITIVE
PH UR STRIP.AUTO: 5.5 [PH] (ref 5–8)
PLATELET # BLD AUTO: 268 K/UL (ref 135–450)
PMV BLD AUTO: 10.2 FL (ref 5–10.5)
POTASSIUM SERPL-SCNC: 4.5 MMOL/L (ref 3.5–5.1)
PROCALCITONIN SERPL IA-MCNC: 0.07 NG/ML (ref 0–0.15)
PROT UR STRIP.AUTO-MCNC: 100 MG/DL
RBC # BLD AUTO: 4.68 M/UL (ref 4–5.2)
RBC #/AREA URNS HPF: ABNORMAL /HPF (ref 0–4)
SARS-COV-2 RDRP RESP QL NAA+PROBE: NOT DETECTED
SODIUM SERPL-SCNC: 141 MMOL/L (ref 136–145)
SP GR UR STRIP.AUTO: 1.02 (ref 1–1.03)
UA COMPLETE W REFLEX CULTURE PNL UR: YES
UA DIPSTICK W REFLEX MICRO PNL UR: YES
URN SPEC COLLECT METH UR: ABNORMAL
UROBILINOGEN UR STRIP-ACNC: 0.2 E.U./DL
WBC # BLD AUTO: 10.4 K/UL (ref 4–11)
WBC #/AREA URNS HPF: ABNORMAL /HPF (ref 0–5)

## 2024-06-18 PROCEDURE — 87086 URINE CULTURE/COLONY COUNT: CPT

## 2024-06-18 PROCEDURE — 6370000000 HC RX 637 (ALT 250 FOR IP): Performed by: EMERGENCY MEDICINE

## 2024-06-18 PROCEDURE — 87077 CULTURE AEROBIC IDENTIFY: CPT

## 2024-06-18 PROCEDURE — 81001 URINALYSIS AUTO W/SCOPE: CPT

## 2024-06-18 PROCEDURE — 87186 SC STD MICRODIL/AGAR DIL: CPT

## 2024-06-18 RX ORDER — CLINDAMYCIN HYDROCHLORIDE 150 MG/1
300 CAPSULE ORAL ONCE
Status: DISCONTINUED | OUTPATIENT
Start: 2024-06-18 | End: 2024-06-18

## 2024-06-18 RX ORDER — CEFUROXIME AXETIL 250 MG/1
250 TABLET ORAL 2 TIMES DAILY
Qty: 14 TABLET | Refills: 0 | Status: SHIPPED | OUTPATIENT
Start: 2024-06-18 | End: 2024-06-25

## 2024-06-18 RX ORDER — TRAMADOL HYDROCHLORIDE 50 MG/1
50 TABLET ORAL EVERY 6 HOURS PRN
Qty: 6 TABLET | Refills: 0 | Status: SHIPPED | OUTPATIENT
Start: 2024-06-18 | End: 2024-06-21

## 2024-06-18 RX ORDER — CEFUROXIME AXETIL 250 MG/1
500 TABLET ORAL ONCE
Status: COMPLETED | OUTPATIENT
Start: 2024-06-18 | End: 2024-06-18

## 2024-06-18 RX ORDER — NAPROXEN 500 MG/1
500 TABLET ORAL 2 TIMES DAILY WITH MEALS
Qty: 60 TABLET | Refills: 5 | Status: SHIPPED | OUTPATIENT
Start: 2024-06-18 | End: 2024-06-18

## 2024-06-18 RX ORDER — LIDOCAINE 4 G/G
1 PATCH TOPICAL DAILY
Qty: 30 PATCH | Refills: 0 | Status: SHIPPED | OUTPATIENT
Start: 2024-06-18 | End: 2024-07-18

## 2024-06-18 RX ADMIN — CEFUROXIME AXETIL 500 MG: 250 TABLET ORAL at 01:52

## 2024-06-18 ASSESSMENT — PAIN - FUNCTIONAL ASSESSMENT: PAIN_FUNCTIONAL_ASSESSMENT: 0-10

## 2024-06-18 ASSESSMENT — PAIN DESCRIPTION - ORIENTATION
ORIENTATION: LEFT
ORIENTATION: LEFT

## 2024-06-18 ASSESSMENT — PAIN SCALES - GENERAL
PAINLEVEL_OUTOF10: 10
PAINLEVEL_OUTOF10: 10

## 2024-06-18 ASSESSMENT — PAIN DESCRIPTION - DESCRIPTORS: DESCRIPTORS: STABBING

## 2024-06-18 ASSESSMENT — PAIN DESCRIPTION - LOCATION
LOCATION: ANKLE
LOCATION: FOOT;ANKLE

## 2024-06-18 NOTE — ED TRIAGE NOTES
Pt to ED in wheelchair from lobby with complaint of left ankle pain. Pt states she woke with ankle pain this am, no known injury. Pt states she has chronic left knee pain. + edema noted to left outer ankle. Left foot/ ankle warm to touch with brisk cap refill, + dorsalis pedal pulse. Pt states too painful to weight bear.

## 2024-06-18 NOTE — RESULT ENCOUNTER NOTE
CRP was elevated but patient had a UTI which would elevate the CRP.  Her CRP has been elevated much higher in the past.  She also had ankle pain and swelling.  Please notify patient of test results.  She should follow-up with her doctor for reevaluation of the ankle pain and swelling and UTI.  They may want to recheck her CRP.

## 2024-06-20 LAB
BACTERIA UR CULT: ABNORMAL
ORGANISM: ABNORMAL

## 2024-06-20 NOTE — RESULT ENCOUNTER NOTE
Patient's positive result has been appropriately evaluated by the provider pool.   Patient was contacted and notified of the results, she will notify her PCP.  Pharmacy:   [id number] [place] - [address] - P [phone] - F [phone]  [address] [place] [address] [address]  [place] [address]  Phone: [phone] Fax: [phone]    [facility] [address] - P [phone] - F [phone]  [address] [facility] [address] [facility] [address]  Phone: [phone] Fax: [phone]    [id number] - [place], [place] - [address] - P [phone] - F [phone]  [address] [place] [address]  Phone: [phone] Fax: [phone]    Phone number:   Pharmacist receiving the prescription:

## 2024-06-21 NOTE — ED PROVIDER NOTES
point.  Patient amenable to discharge home with treatment for urinary tract infection.     CONSULTS: (Who and What was discussed)  None          Chronic Conditions:   Past Medical History:   Diagnosis Date    Arthritis     Breast cancer (HCC)     COVID-19 12/28/2020    Depression     Hypertension     Obesity     Peptic ulcer disease     Scoliosis     Skin cancer     basal cell    Thyroid disease        Records Reviewed (External and source): Reviewed patient's previous PCP notes    Disposition Considerations (include 1 Tests not done, Admit vs D/C, Shared Decision Making, Pt Expectation of Test or Tx.): Considered obtaining CT imaging evaluate for occult fracture but this is without change patient's disposition and subject him to potentially unnecessary radiation and can be done in the outpatient setting if symptoms do not improve.    Admission to the hospital considered but patient's symptoms are improving.  Vital signs and testing performed is reassuring.  Based on this patient is appropriate for outpatient management.  No indication for admission at this time.     Symptomatic treatment with expectant management discussed with the patient and/or family member or surrogates present and they are amenable to treatment plan and outpatient follow-up.  Strict return precautions were discussed with the patient and those present.  All parties involved were informed that condition may persist or worsen in which case they may then require inpatient treatment, currently there is no indication.  They demonstrated understanding of when to return to the emergency department for new or worsening symptoms.      I am the Primary Clinician of Record.    FINAL IMPRESSION      1. Acute left ankle pain    2. Acute cystitis with hematuria          DISPOSITION/PLAN     DISPOSITION Decision To Discharge 06/18/2024 01:45:29 AM      PATIENT REFERRED TO:  Barbara Ribera MD  [address]  [address] [place]

## 2024-10-08 ENCOUNTER — HOSPITAL ENCOUNTER (OUTPATIENT)
Dept: WOMENS IMAGING | Age: 69
Discharge: HOME | End: 2024-10-08
Payer: MEDICARE

## 2024-10-08 DIAGNOSIS — Z12.31 SCREENING MAMMOGRAM FOR BREAST CANCER: ICD-10-CM

## 2024-10-08 PROCEDURE — 77063 BREAST TOMOSYNTHESIS BI: CPT
